# Patient Record
Sex: FEMALE | Race: WHITE | Employment: UNEMPLOYED | URBAN - METROPOLITAN AREA
[De-identification: names, ages, dates, MRNs, and addresses within clinical notes are randomized per-mention and may not be internally consistent; named-entity substitution may affect disease eponyms.]

---

## 2017-04-05 ENCOUNTER — HOSPITAL ENCOUNTER (EMERGENCY)
Age: 47
Discharge: HOME | End: 2017-04-05
Attending: EMERGENCY MEDICINE | Admitting: EMERGENCY MEDICINE
Payer: SUBSIDIZED

## 2017-04-05 VITALS
OXYGEN SATURATION: 100 % | RESPIRATION RATE: 14 BRPM | HEART RATE: 61 BPM | TEMPERATURE: 97.9 F | SYSTOLIC BLOOD PRESSURE: 103 MMHG | BODY MASS INDEX: 26.22 KG/M2 | DIASTOLIC BLOOD PRESSURE: 91 MMHG | WEIGHT: 163.14 LBS | HEIGHT: 66 IN

## 2017-04-05 DIAGNOSIS — J01.10 ACUTE NON-RECURRENT FRONTAL SINUSITIS: Primary | ICD-10-CM

## 2017-04-05 DIAGNOSIS — R00.2 PALPITATIONS: ICD-10-CM

## 2017-04-05 LAB
ALBUMIN SERPL BCP-MCNC: 4.2 G/DL (ref 3.5–5)
ALBUMIN/GLOB SERPL: 1.1 {RATIO} (ref 1.1–2.2)
ALP SERPL-CCNC: 72 U/L (ref 45–117)
ALT SERPL-CCNC: 25 U/L (ref 12–78)
ANION GAP BLD CALC-SCNC: 8 MMOL/L (ref 5–15)
APPEARANCE UR: CLEAR
AST SERPL W P-5'-P-CCNC: 20 U/L (ref 15–37)
ATRIAL RATE: 73 BPM
BACTERIA URNS QL MICRO: ABNORMAL /HPF
BASOPHILS # BLD AUTO: 0.1 K/UL (ref 0–0.1)
BASOPHILS # BLD: 1 % (ref 0–1)
BILIRUB SERPL-MCNC: 0.4 MG/DL (ref 0.2–1)
BILIRUB UR QL: NEGATIVE
BUN SERPL-MCNC: 9 MG/DL (ref 6–20)
BUN/CREAT SERPL: 9 (ref 12–20)
CALCIUM SERPL-MCNC: 8.8 MG/DL (ref 8.5–10.1)
CALCULATED P AXIS, ECG09: 77 DEGREES
CALCULATED R AXIS, ECG10: 6 DEGREES
CALCULATED T AXIS, ECG11: 44 DEGREES
CHLORIDE SERPL-SCNC: 103 MMOL/L (ref 97–108)
CO2 SERPL-SCNC: 27 MMOL/L (ref 21–32)
COLOR UR: ABNORMAL
CREAT SERPL-MCNC: 1.01 MG/DL (ref 0.55–1.02)
DIAGNOSIS, 93000: NORMAL
EOSINOPHIL # BLD: 0.2 K/UL (ref 0–0.4)
EOSINOPHIL NFR BLD: 3 % (ref 0–7)
EPITH CASTS URNS QL MICRO: ABNORMAL /LPF
ERYTHROCYTE [DISTWIDTH] IN BLOOD BY AUTOMATED COUNT: 12.2 % (ref 11.5–14.5)
GLOBULIN SER CALC-MCNC: 3.9 G/DL (ref 2–4)
GLUCOSE SERPL-MCNC: 89 MG/DL (ref 65–100)
GLUCOSE UR STRIP.AUTO-MCNC: NEGATIVE MG/DL
HCG UR QL: NEGATIVE
HCT VFR BLD AUTO: 44.2 % (ref 35–47)
HGB BLD-MCNC: 14.9 G/DL (ref 11.5–16)
HGB UR QL STRIP: NEGATIVE
HYALINE CASTS URNS QL MICRO: ABNORMAL /LPF (ref 0–5)
KETONES UR QL STRIP.AUTO: NEGATIVE MG/DL
LEUKOCYTE ESTERASE UR QL STRIP.AUTO: NEGATIVE
LYMPHOCYTES # BLD AUTO: 37 % (ref 12–49)
LYMPHOCYTES # BLD: 1.9 K/UL (ref 0.8–3.5)
MCH RBC QN AUTO: 31.8 PG (ref 26–34)
MCHC RBC AUTO-ENTMCNC: 33.7 G/DL (ref 30–36.5)
MCV RBC AUTO: 94.4 FL (ref 80–99)
MONOCYTES # BLD: 0.6 K/UL (ref 0–1)
MONOCYTES NFR BLD AUTO: 11 % (ref 5–13)
NEUTS SEG # BLD: 2.5 K/UL (ref 1.8–8)
NEUTS SEG NFR BLD AUTO: 48 % (ref 32–75)
NITRITE UR QL STRIP.AUTO: NEGATIVE
P-R INTERVAL, ECG05: 142 MS
PH UR STRIP: 7 [PH] (ref 5–8)
PLATELET # BLD AUTO: 335 K/UL (ref 150–400)
POTASSIUM SERPL-SCNC: 3.8 MMOL/L (ref 3.5–5.1)
PROT SERPL-MCNC: 8.1 G/DL (ref 6.4–8.2)
PROT UR STRIP-MCNC: NEGATIVE MG/DL
Q-T INTERVAL, ECG07: 368 MS
QRS DURATION, ECG06: 78 MS
QTC CALCULATION (BEZET), ECG08: 405 MS
RBC # BLD AUTO: 4.68 M/UL (ref 3.8–5.2)
RBC #/AREA URNS HPF: ABNORMAL /HPF (ref 0–5)
SODIUM SERPL-SCNC: 138 MMOL/L (ref 136–145)
SP GR UR REFRACTOMETRY: <1.005 (ref 1–1.03)
UA: UC IF INDICATED,UAUC: ABNORMAL
UROBILINOGEN UR QL STRIP.AUTO: 0.2 EU/DL (ref 0.2–1)
VENTRICULAR RATE, ECG03: 73 BPM
WBC # BLD AUTO: 5.1 K/UL (ref 3.6–11)
WBC URNS QL MICRO: ABNORMAL /HPF (ref 0–4)

## 2017-04-05 PROCEDURE — 80053 COMPREHEN METABOLIC PANEL: CPT | Performed by: PHYSICIAN ASSISTANT

## 2017-04-05 PROCEDURE — 81001 URINALYSIS AUTO W/SCOPE: CPT | Performed by: PHYSICIAN ASSISTANT

## 2017-04-05 PROCEDURE — 36415 COLL VENOUS BLD VENIPUNCTURE: CPT | Performed by: PHYSICIAN ASSISTANT

## 2017-04-05 PROCEDURE — 85025 COMPLETE CBC W/AUTO DIFF WBC: CPT | Performed by: PHYSICIAN ASSISTANT

## 2017-04-05 PROCEDURE — 81025 URINE PREGNANCY TEST: CPT | Performed by: PHYSICIAN ASSISTANT

## 2017-04-05 PROCEDURE — 93005 ELECTROCARDIOGRAM TRACING: CPT

## 2017-04-05 PROCEDURE — 99283 EMERGENCY DEPT VISIT LOW MDM: CPT

## 2017-04-05 RX ORDER — DEXAMETHASONE SODIUM PHOSPHATE 4 MG/ML
10 INJECTION, SOLUTION INTRA-ARTICULAR; INTRALESIONAL; INTRAMUSCULAR; INTRAVENOUS; SOFT TISSUE
Status: DISCONTINUED | OUTPATIENT
Start: 2017-04-05 | End: 2017-04-05 | Stop reason: HOSPADM

## 2017-04-05 RX ORDER — PREDNISONE 20 MG/1
60 TABLET ORAL DAILY
Qty: 12 TAB | Refills: 0 | Status: SHIPPED | OUTPATIENT
Start: 2017-04-05 | End: 2017-04-09

## 2017-04-05 RX ORDER — KETOROLAC TROMETHAMINE 30 MG/ML
30 INJECTION, SOLUTION INTRAMUSCULAR; INTRAVENOUS
Status: DISCONTINUED | OUTPATIENT
Start: 2017-04-05 | End: 2017-04-05 | Stop reason: HOSPADM

## 2017-04-05 RX ORDER — LORATADINE 10 MG/1
10 TABLET ORAL DAILY
Qty: 10 TAB | Refills: 0 | Status: SHIPPED | OUTPATIENT
Start: 2017-04-05 | End: 2017-04-15

## 2017-04-05 RX ORDER — CEPHALEXIN 500 MG/1
500 CAPSULE ORAL 2 TIMES DAILY
Qty: 14 CAP | Refills: 0 | Status: SHIPPED | OUTPATIENT
Start: 2017-04-05 | End: 2017-04-12

## 2017-04-05 RX ORDER — NAPROXEN SODIUM 220 MG
220 TABLET ORAL AS NEEDED
COMMUNITY
End: 2018-02-12

## 2017-04-05 NOTE — ED PROVIDER NOTES
HPI Comments: Cher Shirley is a 42 y.o. female with no pertinent PMHx, presenting ambulatory to the ED c/o sinus congestion x ~2 days. Pt notes associated symptoms of frontal headaches, cough only with sinus drainage, and sinus pressure. Of note, pt has a hx of environmental allergies (ie dogs and dust mites). Pt states that she has been taking OTC decongestants and Mucinex, which she believes have caused an increase of her chronic palpitations. Pt states \"it feels like my heart stops for a second, which causes me to catch my breath\". Pt states that \"then it beats really fast and returns to normal\". Pt also notes a recent increase in stress. Pt states that she has a hx of similar palpations, for which she has had appropriate cardiology follow up with a normal work up. Pt denies any recent cardiology follow up. Pt denies any history of DM. Pt specifically denies any SOB or chest pain. PCP: Audie Kay NP (Inactive)  Cardiology: Dr. Essence Lua Hx: - tobacco use, + occasional alcohol use, - illicit drug use    There are no other complaints, changes, or physical findings at this time. The history is provided by the patient. No  was used. Past Medical History:   Diagnosis Date    High cholesterol     Mononucleosis        Past Surgical History:   Procedure Laterality Date    HX HEENT      nose         Family History:   Problem Relation Age of Onset    Cancer Mother     Heart Disease Father        Social History     Social History    Marital status: SINGLE     Spouse name: N/A    Number of children: N/A    Years of education: N/A     Occupational History    Not on file.      Social History Main Topics    Smoking status: Former Smoker     Quit date: 3/7/2015    Smokeless tobacco: Former User     Quit date: 5/30/2015    Alcohol use 8.4 oz/week     14 Cans of beer per week    Drug use: No    Sexual activity: Yes     Partners: Female     Birth control/ protection: None Other Topics Concern    Not on file     Social History Narrative         ALLERGIES: Sulfa (sulfonamide antibiotics)    Review of Systems   Constitutional: Negative. Negative for chills and fever. HENT: Positive for congestion and sinus pressure. Eyes: Negative. Respiratory: Positive for cough (only with sinus drainage). Negative for shortness of breath and wheezing. Cardiovascular: Positive for palpitations. Negative for chest pain. Gastrointestinal: Negative. Negative for abdominal pain, diarrhea, nausea and vomiting. Genitourinary: Negative. Negative for difficulty urinating, dysuria and vaginal pain. Skin: Negative. Allergic/Immunologic: Positive for environmental allergies. Neurological: Positive for headaches (frontal). Psychiatric/Behavioral: Negative. All other systems reviewed and are negative. Vitals:    04/05/17 0946 04/05/17 1041   BP:  111/89   Pulse: 71 71   Resp: 12 13   Temp: 97.9 °F (36.6 °C)    SpO2: 99% 100%   Weight: 74 kg (163 lb 2.3 oz)    Height: 5' 6\" (1.676 m)             Physical Exam   Constitutional: She is oriented to person, place, and time. She appears well-developed and well-nourished. No distress. HENT:   Head: Normocephalic and atraumatic. Boggy nasal mucosa, clear rhinorrhea, posterior oropharynx injected without exudate. Increased effusion noted to bilat TMs, no erythema, good light reflex noted. Eyes: Conjunctivae and EOM are normal. Right eye exhibits no discharge. Left eye exhibits no discharge. No scleral icterus. Neck: Normal range of motion. Neck supple. No JVD present. No tracheal deviation present. No thyromegaly present. Cardiovascular: Normal rate, regular rhythm and normal heart sounds. Pulmonary/Chest: Effort normal and breath sounds normal. No respiratory distress. She has no wheezes. Abdominal: Soft. There is no tenderness. Musculoskeletal: Normal range of motion. She exhibits no edema.    Lymphadenopathy: She has no cervical adenopathy. Neurological: She is alert and oriented to person, place, and time. She exhibits normal muscle tone. Coordination normal.   Skin: Skin is warm and dry. She is not diaphoretic. Psychiatric: She has a normal mood and affect. Her behavior is normal. Judgment normal.   Nursing note and vitals reviewed. MDM  Number of Diagnoses or Management Options  Diagnosis management comments: DDx: sinusitis, electrolyte dysfunction, arrhythmia, UTI       Amount and/or Complexity of Data Reviewed  Clinical lab tests: reviewed and ordered  Tests in the medicine section of CPT®: ordered and reviewed  Review and summarize past medical records: yes  Independent visualization of images, tracings, or specimens: yes    Patient Progress  Patient progress: stable    ED Course       Procedures   EKG interpretation: (Preliminary) at 0951  Rhythm: normal sinus rhythm; and regular . Rate (approx.): 73 bpm; Axis: normal; P wave: normal; QRS interval: normal ; ST/T wave: normal.  Written by Keila Palacios. Raúl Shah, ED Scribe, as dictated by Gisele Marcelo.       LABORATORY TESTS:  Recent Results (from the past 12 hour(s))   EKG, 12 LEAD, INITIAL    Collection Time: 04/05/17  9:51 AM   Result Value Ref Range    Ventricular Rate 73 BPM    Atrial Rate 73 BPM    P-R Interval 142 ms    QRS Duration 78 ms    Q-T Interval 368 ms    QTC Calculation (Bezet) 405 ms    Calculated P Axis 77 degrees    Calculated R Axis 6 degrees    Calculated T Axis 44 degrees    Diagnosis       ** Poor data quality, interpretation may be adversely affected  Normal sinus rhythm  Low voltage QRS  RSR' pattern in V1      Confirmed by Maksim Dash MD, Luis Gonzalez ([id number]) on 4/5/2017 10:25:06 AM     URINALYSIS W/ REFLEX CULTURE    Collection Time: 04/05/17 11:14 AM   Result Value Ref Range    Color YELLOW/STRAW      Appearance CLEAR CLEAR      Specific gravity <1.005 1.003 - 1.030    pH (UA) 7.0 5.0 - 8.0      Protein NEGATIVE  NEG mg/dL    Glucose NEGATIVE NEG mg/dL    Ketone NEGATIVE  NEG mg/dL    Bilirubin NEGATIVE  NEG      Blood NEGATIVE  NEG      Urobilinogen 0.2 0.2 - 1.0 EU/dL    Nitrites NEGATIVE  NEG      Leukocyte Esterase NEGATIVE  NEG      WBC 0-4 0 - 4 /hpf    RBC 0-5 0 - 5 /hpf    Epithelial cells FEW FEW /lpf    Bacteria 1+ (A) NEG /hpf    UA:UC IF INDICATED CULTURE NOT INDICATED BY UA RESULT CNI      Hyaline cast 0-2 0 - 5 /lpf   HCG URINE, QL    Collection Time: 04/05/17 11:14 AM   Result Value Ref Range    HCG urine, Ql. NEGATIVE  NEG     CBC WITH AUTOMATED DIFF    Collection Time: 04/05/17 11:14 AM   Result Value Ref Range    WBC 5.1 3.6 - 11.0 K/uL    RBC 4.68 3.80 - 5.20 M/uL    HGB 14.9 11.5 - 16.0 g/dL    HCT 44.2 35.0 - 47.0 %    MCV 94.4 80.0 - 99.0 FL    MCH 31.8 26.0 - 34.0 PG    MCHC 33.7 30.0 - 36.5 g/dL    RDW 12.2 11.5 - 14.5 %    PLATELET 806 414 - 220 K/uL    NEUTROPHILS 48 32 - 75 %    LYMPHOCYTES 37 12 - 49 %    MONOCYTES 11 5 - 13 %    EOSINOPHILS 3 0 - 7 %    BASOPHILS 1 0 - 1 %    ABS. NEUTROPHILS 2.5 1.8 - 8.0 K/UL    ABS. LYMPHOCYTES 1.9 0.8 - 3.5 K/UL    ABS. MONOCYTES 0.6 0.0 - 1.0 K/UL    ABS. EOSINOPHILS 0.2 0.0 - 0.4 K/UL    ABS. BASOPHILS 0.1 0.0 - 0.1 K/UL   METABOLIC PANEL, COMPREHENSIVE    Collection Time: 04/05/17 11:14 AM   Result Value Ref Range    Sodium 138 136 - 145 mmol/L    Potassium 3.8 3.5 - 5.1 mmol/L    Chloride 103 97 - 108 mmol/L    CO2 27 21 - 32 mmol/L    Anion gap 8 5 - 15 mmol/L    Glucose 89 65 - 100 mg/dL    BUN 9 6 - 20 MG/DL    Creatinine 1.01 0.55 - 1.02 MG/DL    BUN/Creatinine ratio 9 (L) 12 - 20      GFR est AA >60 >60 ml/min/1.73m2    GFR est non-AA 60 (L) >60 ml/min/1.73m2    Calcium 8.8 8.5 - 10.1 MG/DL    Bilirubin, total 0.4 0.2 - 1.0 MG/DL    ALT (SGPT) 25 12 - 78 U/L    AST (SGOT) 20 15 - 37 U/L    Alk.  phosphatase 72 45 - 117 U/L    Protein, total 8.1 6.4 - 8.2 g/dL    Albumin 4.2 3.5 - 5.0 g/dL    Globulin 3.9 2.0 - 4.0 g/dL    A-G Ratio 1.1 1.1 - 2.2       MEDICATIONS GIVEN:  Medications   ketorolac (TORADOL) injection 30 mg (not administered)   dexamethasone (DECADRON) 4 mg/mL injection 10 mg (not administered)       IMPRESSION:  1. Acute non-recurrent frontal sinusitis    2. Palpitations        PLAN:  1. Current Discharge Medication List      START taking these medications    Details   predniSONE (DELTASONE) 20 mg tablet Take 3 Tabs by mouth daily for 4 days. Qty: 12 Tab, Refills: 0      cephALEXin (KEFLEX) 500 mg capsule Take 1 Cap by mouth two (2) times a day for 7 days. Qty: 14 Cap, Refills: 0      loratadine (CLARITIN) 10 mg tablet Take 1 Tab by mouth daily for 10 days. Qty: 10 Tab, Refills: 0           2. Follow-up Information     Follow up With Details Comments Contact Info    KYLER Ramsay MD [address] [place] [id number]  [phone]      [facility] EMERGENCY DEPT  If symptoms worsen [address] [place] [address]  [address] [facility]  [phone]        Return to ED if worse   DISCHARGE NOTE:  12:26 PM  The patient is ready for discharge. The patient's signs, symptoms, diagnosis, and discharge instructions have been discussed and the patient and/or family has conveyed their understanding. The patient and/or family is to follow up as recommended or return to the ER should their symptoms worsen. Plan has been discussed and the patient and/or family is in agreement. Written by Janine Richard, ED Scribe, as dictated by Diane Resendez. Attestation: This note is prepared by Leslie Nunez. Jumana Richard, acting as Scribe for Diane Resendez. MARY LOU Garcia: The scribe's documentation has been prepared under my direction and personally reviewed by me in its entirety. I confirm that the note above accurately reflects all work, treatment, procedures, and medical decision making performed by me.

## 2017-04-05 NOTE — ED NOTES
Patient reports that she has had a head cold since Friday. Patient states that she has been feeling flutters in her heart the last few days. Patient reports that she has had this before and was seen by a cardiologist a few years ago. Patient reports that she often feels these flutters when she's anxious. Denies CP but states that she feels like she gets short of breath with some of it. Reports intermittent nausea No distress noted. Will continue to monitor.

## 2017-04-05 NOTE — DISCHARGE INSTRUCTIONS
Sinusitis: Care Instructions  Your Care Instructions    Sinusitis is an infection of the lining of the sinus cavities in your head. Sinusitis often follows a cold. It causes pain and pressure in your head and face. In most cases, sinusitis gets better on its own in 1 to 2 weeks. But some mild symptoms may last for several weeks. Sometimes antibiotics are needed. Follow-up care is a key part of your treatment and safety. Be sure to make and go to all appointments, and call your doctor if you are having problems. It's also a good idea to know your test results and keep a list of the medicines you take. How can you care for yourself at home? · Take an over-the-counter pain medicine, such as acetaminophen (Tylenol), ibuprofen (Advil, Motrin), or naproxen (Aleve). Read and follow all instructions on the label. · If the doctor prescribed antibiotics, take them as directed. Do not stop taking them just because you feel better. You need to take the full course of antibiotics. · Be careful when taking over-the-counter cold or flu medicines and Tylenol at the same time. Many of these medicines have acetaminophen, which is Tylenol. Read the labels to make sure that you are not taking more than the recommended dose. Too much acetaminophen (Tylenol) can be harmful. · Breathe warm, moist air from a steamy shower, a hot bath, or a sink filled with hot water. Avoid cold, dry air. Using a humidifier in your home may help. Follow the directions for cleaning the machine. · Use saline (saltwater) nasal washes to help keep your nasal passages open and wash out mucus and bacteria. You can buy saline nose drops at a grocery store or drugstore. Or you can make your own at home by adding 1 teaspoon of salt and 1 teaspoon of baking soda to 2 cups of distilled water. If you make your own, fill a bulb syringe with the solution, insert the tip into your nostril, and squeeze gently. [employer] your nose.   · Put a hot, wet towel or a warm gel pack on your face 3 or 4 times a day for 5 to 10 minutes each time. · Try a decongestant nasal spray like oxymetazoline (Afrin). Do not use it for more than 3 days in a row. Using it for more than 3 days can make your congestion worse. When should you call for help? Call your doctor now or seek immediate medical care if:  · You have new or worse swelling or redness in your face or around your eyes. · You have a new or higher fever. Watch closely for changes in your health, and be sure to contact your doctor if:  · You have new or worse facial pain. · The mucus from your nose becomes thicker (like pus) or has new blood in it. · You are not getting better as expected. Where can you learn more? Go to http://franki-vikram.info/. Enter X186 in the search box to learn more about \"Sinusitis: Care Instructions. \"  Current as of: July 29, 2016  Content Version: 11.2  © 5072-6740 [employer]. Care instructions adapted under license by [employer] (which disclaims liability or warranty for this information). If you have questions about a medical condition or this instruction, always ask your healthcare professional. [place] [address] any warranty or liability for your use of this information. Saline Nasal Washes: Care Instructions  Your Care Instructions  Saline nasal washes help keep the nasal passages open by washing out thick or dried mucus. This simple remedy can help relieve symptoms of allergies, sinusitis, and colds. It also can make the nose feel more comfortable by keeping the mucous membranes moist. You may notice a little burning sensation in your nose the first few times you use the solution, but this usually gets better in a few days. Follow-up care is a key part of your treatment and safety. Be sure to make and go to all appointments, and call your doctor if you are having problems.  It's also a good idea to know your test results and keep a list of the medicines you take. How can you care for yourself at home? · You can buy premixed saline solution in a squeeze bottle or other sinus rinse products at a drugstore. Read and follow the instructions on the label. · You also can make your own saline solution by adding 1 teaspoon of salt and 1 teaspoon of baking soda to 2 cups of distilled water. · If you use a homemade solution, pour a small amount into a clean bowl. Using a rubber bulb syringe, squeeze the syringe and place the tip in the salt water. Pull a small amount of the salt water into the syringe by relaxing your hand. · Sit down with your head tilted slightly back. Do not lie down. Put the tip of the bulb syringe or the squeeze bottle a little way into one of your nostrils. Gently drip or squirt a few drops into the nostril. Repeat with the other nostril. Some sneezing and gagging are normal at first.  · Gently blow your nose. · Wipe the syringe or bottle tip clean after each use. · Repeat this 2 or 3 times a day. · Use nasal washes gently if you have nosebleeds often. When should you call for help? Watch closely for changes in your health, and be sure to contact your doctor if:  · You often get nosebleeds. · You have problems doing the nasal washes. Where can you learn more? Go to http://franki-vikram.info/. Enter [id number] in the search box to learn more about \"Saline Nasal Washes: Care Instructions. \"  Current as of: July 29, 2016  Content Version: 11.2  © 0031-4100 [employer]. Care instructions adapted under license by [employer] (which disclaims liability or warranty for this information). If you have questions about a medical condition or this instruction, always ask your healthcare professional. [address] any warranty or liability for your use of this information.          Palpitations: Care Instructions  Your Care Instructions    Heart palpitations are the uncomfortable sensation that your heart is beating fast or irregularly. You might feel pounding or fluttering in your chest. It might feel like your heart is skipping a beat. Although palpitations may be caused by a heart problem, they also occur because of stress, fatigue, or use of alcohol, caffeine, or nicotine. Many medicines, including diet pills, antihistamines, decongestants, and some herbal products, can cause heart palpitations. Nearly everyone has palpitations from time to time. Depending on your symptoms, your doctor may need to do more tests to try to find the cause of your palpitations. Follow-up care is a key part of your treatment and safety. Be sure to make and go to all appointments, and call your doctor if you are having problems. It's also a good idea to know your test results and keep a list of the medicines you take. How can you care for yourself at home? · Avoid caffeine, nicotine, and excess alcohol. · Do not take illegal drugs, such as methamphetamines and cocaine. · Do not take weight loss or diet medicines unless you talk with your doctor first.  · Get plenty of sleep. · Do not overeat. · If you have palpitations again, take deep breaths and try to relax. This may slow a racing heart. · If you start to feel lightheaded, lie down to avoid injuries that might result if you pass out and fall down. · Keep a record of your palpitations and bring it to your next doctor's appointment. Write down:  ¨ The date and time. ¨ Your pulse. (If your heart is beating fast, it may be hard to count your pulse.)  ¨ What you were doing when the palpitations started. ¨ How long the palpitations lasted. ¨ Any other symptoms. · If an activity causes palpitations, slow down or stop. Talk to your doctor before you do that activity again. · Take your medicines exactly as prescribed. Call your doctor if you think you are having a problem with your medicine. When should you call for help?   Call [phone] anytime you think you may need emergency care. For example, call if:  · You passed out (lost consciousness). · You have symptoms of a heart attack. These may include:  ¨ Chest pain or pressure, or a strange feeling in the chest.  ¨ Sweating. ¨ Shortness of breath. ¨ Pain, pressure, or a strange feeling in the back, neck, jaw, or upper belly or in one or both shoulders or arms. ¨ Lightheadedness or sudden weakness. ¨ A fast or irregular heartbeat. After you call 911, the  may tell you to chew 1 adult-strength or 2 to 4 low-dose aspirin. Wait for an ambulance. Do not try to drive yourself. · You have symptoms of a stroke. These may include:  ¨ Sudden numbness, tingling, weakness, or loss of movement in your face, arm, or leg, especially on only one side of your body. ¨ Sudden vision changes. ¨ Sudden trouble speaking. ¨ Sudden confusion or trouble understanding simple statements. ¨ Sudden problems with walking or balance. ¨ A sudden, severe headache that is different from past headaches. Call your doctor now or seek immediate medical care if:  · You have heart palpitations and:  ¨ Are dizzy or lightheaded, or you feel like you may faint. ¨ Have new or increased shortness of breath. Watch closely for changes in your health, and be sure to contact your doctor if:  · You continue to have heart palpitations. Where can you learn more? Go to http://franki-vikram.info/. Enter R508 in the search box to learn more about \"Palpitations: Care Instructions. \"  Current as of: January 27, 2016  Content Version: 11.2  © 6134-4820 [employer]. Care instructions adapted under license by [employer] (which disclaims liability or warranty for this information). If you have questions about a medical condition or this instruction, always ask your healthcare professional. [address] any warranty or liability for your use of this information.

## 2017-04-05 NOTE — LETTER
[address] 
[facility] EMERGENCY DEPT 
[address] [place] [address] [address] [id number] [phone] Work/School Note Date: 4/5/2017 To Whom It May concern: 
 
Cindy Griggs was seen and treated today in the emergency room. She has been experiencing symptoms since 4/3/17. She may return to work in 1 to 2 days, as symptoms improve. Sincerely, Palak Singletary

## 2017-04-05 NOTE — ED NOTES
Pt discharged by LIUDMILA Garcia. Pt provided with discharge instructions Rx and instructions on follow up care. Pt out of ED under own power with steady gait accompanied by self. Pt IV discontinued prior to pt receiving any medications.

## 2017-08-16 ENCOUNTER — HOSPITAL ENCOUNTER (EMERGENCY)
Age: 47
Discharge: HOME | End: 2017-08-16
Attending: EMERGENCY MEDICINE | Admitting: EMERGENCY MEDICINE
Payer: SUBSIDIZED

## 2017-08-16 VITALS
HEIGHT: 66 IN | OXYGEN SATURATION: 98 % | TEMPERATURE: 97.7 F | SYSTOLIC BLOOD PRESSURE: 127 MMHG | BODY MASS INDEX: 24.94 KG/M2 | RESPIRATION RATE: 16 BRPM | HEART RATE: 64 BPM | WEIGHT: 155.2 LBS | DIASTOLIC BLOOD PRESSURE: 71 MMHG

## 2017-08-16 DIAGNOSIS — M25.511 ACUTE PAIN OF RIGHT SHOULDER: Primary | ICD-10-CM

## 2017-08-16 PROCEDURE — 99282 EMERGENCY DEPT VISIT SF MDM: CPT

## 2017-08-16 RX ORDER — OXYCODONE AND ACETAMINOPHEN 5; 325 MG/1; MG/1
1 TABLET ORAL
Qty: 15 TAB | Refills: 0 | Status: SHIPPED | OUTPATIENT
Start: 2017-08-16 | End: 2018-02-12

## 2017-08-16 RX ORDER — CYCLOBENZAPRINE HCL 10 MG
10 TABLET ORAL
Qty: 20 TAB | Refills: 0 | Status: SHIPPED | OUTPATIENT
Start: 2017-08-16 | End: 2018-02-12

## 2017-08-16 RX ORDER — IBUPROFEN 600 MG/1
600 TABLET ORAL
Qty: 20 TAB | Refills: 0 | Status: SHIPPED | OUTPATIENT
Start: 2017-08-16 | End: 2017-08-23

## 2017-08-16 NOTE — ED PROVIDER NOTES
HPI Comments: Jewell Sterling is a 42 y.o. female with PMHx of herniated cervical disc, presenting ambulatory to ED c/o intermittent right shoulder/scapular pain for the past month. Pt rates current pain 6/10 and notes it is unchanged with taking Aleve/Naproxen/Advil. She reports the pain is worse with rotating her neck to the left. Pt endorses she started a new job as an  prior to symptom onset. She reports the pain improved a few weeks after onset when she decreased the amount of lifting she was performing on the job and returned recently after resuming heavy lifting. Pt endorses history of cervical herniated disc and bulging disc, for which she is followed by Dr. Buffy Duron with orthopedics. She notes she recently had an injection into her neck per another specialist, which mildly improved her pain. Pt denies known fall/injury/trauma. She denies tobacco use. Orthopedics: Dr. Buffy Duron  PCP: Mario Miner MD  Social Hx: former smoker (quit date: 3/7/2015); + EtOH (8.4 oz/week); - drug use. There are no other complaints, changes, or physical findings at this time. Written by DARION Porter, as dictated by Griffin Olszewski, PA-C. The history is provided by the patient. Past Medical History:   Diagnosis Date    Herniated cervical disc 2014    c4, c6    High cholesterol     Mononucleosis     Palpitations 2016       Past Surgical History:   Procedure Laterality Date    HX HEENT      nose         Family History:   Problem Relation Age of Onset    Cancer Mother     Heart Disease Father        Social History     Social History    Marital status: SINGLE     Spouse name: N/A    Number of children: N/A    Years of education: N/A     Occupational History    Not on file.      Social History Main Topics    Smoking status: Former Smoker     Quit date: 3/7/2015    Smokeless tobacco: Former User     Quit date: 5/30/2015    Alcohol use 8.4 oz/week     14 Cans of beer per week    Drug use: No    Sexual activity: Yes     Partners: Female     Birth control/ protection: None     Other Topics Concern    Not on file     Social History Narrative         ALLERGIES: Sulfa (sulfonamide antibiotics)    Review of Systems   Constitutional: Negative for fatigue and fever. HENT: Negative for ear pain and sore throat. Eyes: Negative for pain, redness and visual disturbance. Respiratory: Negative for cough and shortness of breath. Cardiovascular: Negative for chest pain and palpitations. Gastrointestinal: Negative for abdominal pain, nausea and vomiting. Genitourinary: Negative for dysuria, frequency and urgency. Musculoskeletal: Positive for arthralgias (right shoulder/scapular pain ). Negative for back pain, gait problem, neck pain and neck stiffness. Skin: Negative for rash and wound. Neurological: Negative for dizziness, weakness, light-headedness, numbness and headaches. All other systems reviewed and are negative. Vitals:    08/16/17 1236   BP: 127/71   Pulse: 64   Resp: 16   Temp: 97.7 °F (36.5 °C)   SpO2: 98%   Weight: 70.4 kg (155 lb 3.3 oz)   Height: 5' 6\" (1.676 m)            Physical Exam   Constitutional: She is oriented to person, place, and time. She appears well-developed and well-nourished. Non-toxic appearance. No distress. HENT:   Head: Normocephalic and atraumatic. Right Ear: External ear normal.   Left Ear: External ear normal.   Nose: Nose normal.   Mouth/Throat: Uvula is midline. No trismus in the jaw. Eyes: Conjunctivae and EOM are normal. Pupils are equal, round, and reactive to light. No scleral icterus. Neck: Normal range of motion and full passive range of motion without pain. Cardiovascular: Normal rate and regular rhythm. Pulmonary/Chest: Effort normal. No accessory muscle usage. No tachypnea. No respiratory distress. She has no decreased breath sounds. She has no wheezes. Abdominal: Soft. There is no tenderness.    Musculoskeletal: Normal range of motion. RIGHT SHOULDER:  Good symmetry  No bruising, redness or swelling  ROM limited secondary to pain  Posterior scapular tenderness, worse with movement     Neurological: She is alert and oriented to person, place, and time. She is not disoriented. No cranial nerve deficit. GCS eye subscore is 4. GCS verbal subscore is 5. GCS motor subscore is 6. Skin: Skin is intact. No rash noted. Psychiatric: She has a normal mood and affect. Her speech is normal.   Nursing note and vitals reviewed. MDM  Number of Diagnoses or Management Options  Acute pain of right shoulder:   Diagnosis management comments: Afebrile; well appearing; reassuring exam; additional testing deferred; presentation suggests strain/overuse injury; plan as below         Amount and/or Complexity of Data Reviewed  Review and summarize past medical records: yes    Patient Progress  Patient progress: stable    Procedures    IMPRESSION:  1. Acute pain of right shoulder        PLAN:  1. Current Discharge Medication List      START taking these medications    Details   cyclobenzaprine (FLEXERIL) 10 mg tablet Take 1 Tab by mouth three (3) times daily as needed for Muscle Spasm(s). Qty: 20 Tab, Refills: 0      ibuprofen (MOTRIN) 600 mg tablet Take 1 Tab by mouth every eight (8) hours as needed for Pain for up to 7 days. Qty: 20 Tab, Refills: 0      oxyCODONE-acetaminophen (PERCOCET) 5-325 mg per tablet Take 1 Tab by mouth every four (4) hours as needed for Pain. Max Daily Amount: 6 Tabs. Qty: 15 Tab, Refills: 0           2.    Follow-up Information     Follow up With Details Comments Contact Info    Eb Brooks MD Schedule an appointment as soon as possible for a visit PRIMARY CARE: as needed [address] [place] [address] [phone]      Dariusz Barros MD Schedule an appointment as soon as possible for a visit ORTHO: as needed if symptoms persist [address] [address]  [phone]          Return to ED if worse     DISCHARGE NOTE  1:54 PM  The patient has been re-evaluated and is ready for discharge. Reviewed available results with patient. Counseled pt on diagnosis and care plan. Pt has expressed understanding, and all questions have been answered. Pt agrees with plan and agrees to F/U as recommended, or return to the ED if their sxs worsen. Discharge instructions have been provided and explained to the pt, along with reasons to return to the ED. Written by Georgie Anna, ED Scribe, as dictated by Whitney Lima PA-C. This note is prepared by Georgie Anna, acting as Scribe for Whitney Lima PA-C. Whitney Lima PA-C: The scribe's documentation has been prepared under my direction and personally reviewed by me in its entirety. I confirm that the note above accurately reflects all work, treatment, procedures, and medical decision making performed by me.

## 2017-08-16 NOTE — DISCHARGE INSTRUCTIONS
Thank you for allowing us to provide you with care today. We hope we addressed all of your concerns and needs. We strive to provide excellent quality care in the Emergency Department. Please rate us as excellent, as anything less than excellent does not meet our expectations. If you feel that you have not received excellent quality care or timely care, please ask to speak to the nurse manager. Please choose us in the future for your continued health care needs. The exam and treatment you received in the Emergency Department were for an urgent problem and are not intended as complete care. It is important that you follow-up with a doctor, nurse practitioner, or  [id number] assistant to: (1) confirm your diagnosis, (2) re-evaluation of changes in your illness and treatment, and (3) for ongoing care. If your symptoms become worse or you do not improve as expected and you are unable to reach your usual health care provider, you should return to the Emergency Department. We are available 24 hours a day. Take this sheet with you when you go to your follow-up visit. If you have any problem arranging the follow-up visit, contact the Emergency Department immediately. Make an appointment with your Primary Care doctor for follow up of this visit. Return to the ER if you are unable to be seen in the time recommended on your discharge instructions.

## 2017-08-16 NOTE — ED NOTES
MARY LOU Howard reviewed discharge instructions with the patient. The patient verbalized understanding. Pt ambulatory out of ED.

## 2017-08-16 NOTE — LETTER
[address] 
[facility] EMERGENCY DEPT 
[address] [address] [place] [id number] 
[phone] Work/School Note Date: 8/16/2017 To Whom It May concern: 
 
Basia Remy was seen and treated today in the emergency room by the following provider(s): 
Physician Assistant: LIUDMILA Mckenna. Basia Remy may return to work on 8/19/17 or sooner, if feeling better. Sincerely, Veto Stock PA

## 2017-08-16 NOTE — ED NOTES
LIUDMILA Hathaway reviewed discharge instructions with the patient. The patient verbalized understanding. Patient ambulatory out of ED with discharge paperwork in hand.

## 2017-08-16 NOTE — LETTER
[address] 
[facility] EMERGENCY DEPT 
[address] [place] [address] [address] [id number] [phone] Work/School Note Date: 8/16/2017 To Whom It May concern: 
 
Barbara Arnold was seen and treated today in the emergency room by the following provider(s): 
Attending Provider: Bonnie Barroso DO Physician Assistant: LIUDMILA Bhandari. Barbara Arnold may return to work on [id number] [employer] Sincerely, LIUDMILA Bhandari

## 2017-10-13 ENCOUNTER — HOSPITAL ENCOUNTER (OUTPATIENT)
Dept: GENERAL RADIOLOGY | Age: 47
Discharge: HOME | End: 2017-10-13
Attending: FAMILY MEDICINE
Payer: SUBSIDIZED

## 2017-10-13 ENCOUNTER — HOSPITAL ENCOUNTER (OUTPATIENT)
Dept: MRI IMAGING | Age: 47
Discharge: HOME | End: 2017-10-13
Attending: FAMILY MEDICINE
Payer: SUBSIDIZED

## 2017-10-13 DIAGNOSIS — M50.10 CERVICAL DISC DISORDER WITH RADICULOPATHY: ICD-10-CM

## 2017-10-13 DIAGNOSIS — M54.2 NECK PAIN: ICD-10-CM

## 2017-10-13 PROCEDURE — 74011250636 HC RX REV CODE- 250/636: Performed by: RADIOLOGY

## 2017-10-13 PROCEDURE — 72156 MRI NECK SPINE W/O & W/DYE: CPT

## 2017-10-13 PROCEDURE — 72040 X-RAY EXAM NECK SPINE 2-3 VW: CPT

## 2017-10-13 PROCEDURE — A9576 INJ PROHANCE MULTIPACK: HCPCS | Performed by: RADIOLOGY

## 2017-10-13 RX ADMIN — GADOTERIDOL 15 ML: 279.3 INJECTION, SOLUTION INTRAVENOUS at 16:03

## 2017-12-19 ENCOUNTER — HOSPITAL ENCOUNTER (OUTPATIENT)
Dept: LAB | Age: 47
Discharge: HOME | End: 2017-12-19

## 2017-12-19 PROCEDURE — 85027 COMPLETE CBC AUTOMATED: CPT | Performed by: FAMILY MEDICINE

## 2017-12-19 PROCEDURE — 85652 RBC SED RATE AUTOMATED: CPT | Performed by: FAMILY MEDICINE

## 2017-12-19 PROCEDURE — 80053 COMPREHEN METABOLIC PANEL: CPT | Performed by: FAMILY MEDICINE

## 2017-12-20 LAB
ALBUMIN SERPL-MCNC: 4 G/DL (ref 3.5–5)
ALBUMIN/GLOB SERPL: 1.2 {RATIO} (ref 1.1–2.2)
ALP SERPL-CCNC: 77 U/L (ref 45–117)
ALT SERPL-CCNC: 28 U/L (ref 12–78)
ANION GAP SERPL CALC-SCNC: 7 MMOL/L (ref 5–15)
AST SERPL-CCNC: 16 U/L (ref 15–37)
BILIRUB SERPL-MCNC: 0.2 MG/DL (ref 0.2–1)
BUN SERPL-MCNC: 7 MG/DL (ref 6–20)
BUN/CREAT SERPL: 9 (ref 12–20)
CALCIUM SERPL-MCNC: 8.9 MG/DL (ref 8.5–10.1)
CHLORIDE SERPL-SCNC: 105 MMOL/L (ref 97–108)
CO2 SERPL-SCNC: 28 MMOL/L (ref 21–32)
CREAT SERPL-MCNC: 0.77 MG/DL (ref 0.55–1.02)
ERYTHROCYTE [DISTWIDTH] IN BLOOD BY AUTOMATED COUNT: 12.8 % (ref 11.5–14.5)
ERYTHROCYTE [SEDIMENTATION RATE] IN BLOOD: 6 MM/HR (ref 0–20)
GLOBULIN SER CALC-MCNC: 3.4 G/DL (ref 2–4)
GLUCOSE SERPL-MCNC: 134 MG/DL (ref 65–100)
HCT VFR BLD AUTO: 43.3 % (ref 35–47)
HGB BLD-MCNC: 14.3 G/DL (ref 11.5–16)
MCH RBC QN AUTO: 31.5 PG (ref 26–34)
MCHC RBC AUTO-ENTMCNC: 33 G/DL (ref 30–36.5)
MCV RBC AUTO: 95.4 FL (ref 80–99)
PLATELET # BLD AUTO: 309 K/UL (ref 150–400)
POTASSIUM SERPL-SCNC: 4.5 MMOL/L (ref 3.5–5.1)
PROT SERPL-MCNC: 7.4 G/DL (ref 6.4–8.2)
RBC # BLD AUTO: 4.54 M/UL (ref 3.8–5.2)
SODIUM SERPL-SCNC: 140 MMOL/L (ref 136–145)
WBC # BLD AUTO: 5.7 K/UL (ref 3.6–11)

## 2018-02-12 ENCOUNTER — HOSPITAL ENCOUNTER (EMERGENCY)
Age: 48
Discharge: HOME | End: 2018-02-12
Attending: EMERGENCY MEDICINE
Payer: SELF-PAY

## 2018-02-12 VITALS
TEMPERATURE: 97.4 F | DIASTOLIC BLOOD PRESSURE: 107 MMHG | BODY MASS INDEX: 27.64 KG/M2 | WEIGHT: 171.96 LBS | OXYGEN SATURATION: 100 % | HEIGHT: 66 IN | SYSTOLIC BLOOD PRESSURE: 145 MMHG | RESPIRATION RATE: 22 BRPM

## 2018-02-12 DIAGNOSIS — N39.0 URINARY TRACT INFECTION WITHOUT HEMATURIA, SITE UNSPECIFIED: Primary | ICD-10-CM

## 2018-02-12 LAB
APPEARANCE UR: ABNORMAL
BACTERIA URNS QL MICRO: NEGATIVE /HPF
BILIRUB UR QL: NEGATIVE
COLOR UR: ABNORMAL
EPITH CASTS URNS QL MICRO: ABNORMAL /LPF
GLUCOSE UR STRIP.AUTO-MCNC: NEGATIVE MG/DL
HGB UR QL STRIP: ABNORMAL
KETONES UR QL STRIP.AUTO: NEGATIVE MG/DL
LEUKOCYTE ESTERASE UR QL STRIP.AUTO: ABNORMAL
NITRITE UR QL STRIP.AUTO: POSITIVE
PH UR STRIP: 5.5 [PH] (ref 5–8)
PROT UR STRIP-MCNC: NEGATIVE MG/DL
RBC #/AREA URNS HPF: ABNORMAL /HPF (ref 0–5)
SP GR UR REFRACTOMETRY: 1.01 (ref 1–1.03)
UROBILINOGEN UR QL STRIP.AUTO: 1 EU/DL (ref 0.2–1)
WBC URNS QL MICRO: ABNORMAL /HPF (ref 0–4)

## 2018-02-12 PROCEDURE — 74011250637 HC RX REV CODE- 250/637: Performed by: PHYSICIAN ASSISTANT

## 2018-02-12 PROCEDURE — 99283 EMERGENCY DEPT VISIT LOW MDM: CPT

## 2018-02-12 PROCEDURE — 81001 URINALYSIS AUTO W/SCOPE: CPT | Performed by: EMERGENCY MEDICINE

## 2018-02-12 RX ORDER — PHENAZOPYRIDINE HYDROCHLORIDE 200 MG/1
200 TABLET, FILM COATED ORAL 3 TIMES DAILY
Qty: 6 TAB | Refills: 0 | Status: SHIPPED | OUTPATIENT
Start: 2018-02-12 | End: 2018-02-14

## 2018-02-12 RX ORDER — CIPROFLOXACIN 500 MG/1
500 TABLET ORAL 2 TIMES DAILY
Qty: 14 TAB | Refills: 0 | Status: SHIPPED | OUTPATIENT
Start: 2018-02-12 | End: 2018-02-19

## 2018-02-12 RX ORDER — CIPROFLOXACIN 500 MG/1
500 TABLET ORAL
Status: COMPLETED | OUTPATIENT
Start: 2018-02-12 | End: 2018-02-12

## 2018-02-12 RX ORDER — PHENAZOPYRIDINE HYDROCHLORIDE 100 MG/1
200 TABLET, FILM COATED ORAL
Status: COMPLETED | OUTPATIENT
Start: 2018-02-12 | End: 2018-02-12

## 2018-02-12 RX ADMIN — CIPROFLOXACIN HYDROCHLORIDE 500 MG: 500 TABLET, FILM COATED ORAL at 10:46

## 2018-02-12 RX ADMIN — PHENAZOPYRIDINE HYDROCHLORIDE 200 MG: 100 TABLET ORAL at 10:46

## 2018-02-12 NOTE — DISCHARGE INSTRUCTIONS

## 2018-02-12 NOTE — ED PROVIDER NOTES
EMERGENCY DEPARTMENT HISTORY AND PHYSICAL EXAM      Date: 2/12/2018  Patient Name: Herrera Holly    History of Presenting Illness     Chief Complaint   Patient presents with    Urinary Pain     Pt ambulatory into triage complaining of lower abd pain and dysuria Hx of UTI Pt states hx of UTI      History Provided By: Patient    HPI: Herrera Holly, 41 y.o. female with PMHx significant for hyperlipidemia, presents ambulatory to the ED with cc of gradually worsening abdominal pain alongside dysuria for the past three days. Per pt, she has been presenting with pain to the suprapubic abdomen with a moderate 8/10 intensity with an associated sore, aching sensation to the region. Pt additionally informs of dysuria. Pt states she has a history of chronic, recurrent UTIs secondary to a short ureter. Pt informs she has attempted to take Azo for her discomfort with mild relief. Pt requests antibiotics, specifically Ciprofloxacin, informing that works well to alleviate her symptoms and the infection normally. Pt specifically denies any nausea, vomiting, fevers, chills, further urinary complications, chest pain, or SOB. Social Hx: + EtOH; Former Smoker; - Illicit Drugs    PCP: Adolfo Corral MD    There are no other complaints, changes, or physical findings at this time. Current Facility-Administered Medications   Medication Dose Route Frequency Provider Last Rate Last Dose    ciprofloxacin HCl (CIPRO) tablet 500 mg  500 mg Oral NOW Saad Pizano [place]        phenazopyridine (PYRIDIUM) tablet 200 mg  200 mg Oral NOW LIUDMILA Sagastume         Current Outpatient Prescriptions   Medication Sig Dispense Refill    ciprofloxacin HCl (CIPRO) 500 mg tablet Take 1 Tab by mouth two (2) times a day for 7 days. 14 Tab 0    phenazopyridine (PYRIDIUM) 200 mg tablet Take 1 Tab by mouth three (3) times daily for 6 doses.  6 Tab 0     Past History     Past Medical History:  Past Medical History:   Diagnosis Date    Herniated cervical disc 2014    c4, c6    High cholesterol     Mononucleosis     Palpitations 2016     Past Surgical History:  Past Surgical History:   Procedure Laterality Date    HX HEENT      nose     Family History:  Family History   Problem Relation Age of Onset    Cancer Mother     Heart Disease Father      Social History:  Social History   Substance Use Topics    Smoking status: Former Smoker     Quit date: 3/7/2015    Smokeless tobacco: Former User     Quit date: 5/30/2015    Alcohol use 8.4 oz/week     14 Cans of beer per week     Allergies: Allergies   Allergen Reactions    Sulfa (Sulfonamide Antibiotics) Swelling     Review of Systems   Review of Systems   Constitutional: Negative for chills and fever. HENT: Negative for rhinorrhea and sore throat. Eyes: Negative for visual disturbance. Respiratory: Negative for cough and shortness of breath. Cardiovascular: Negative for chest pain. Gastrointestinal: Positive for abdominal pain. Negative for constipation, diarrhea, nausea and vomiting. Genitourinary: Positive for dysuria. Negative for frequency, hematuria and urgency. Musculoskeletal: Negative for back pain. Skin: Negative for wound. Neurological: Negative for headaches. All other systems reviewed and are negative. Physical Exam   Physical Exam   Constitutional: She is oriented to person, place, and time. She appears well-developed and well-nourished. No distress. Very pleasant 40 yo  female   HENT:   Head: Normocephalic and atraumatic. Eyes: Conjunctivae are normal. Right eye exhibits no discharge. Left eye exhibits no discharge. Neck: Normal range of motion. Neck supple. Cardiovascular: Normal rate, regular rhythm and normal heart sounds. No murmur heard. Pulmonary/Chest: Effort normal and breath sounds normal. No respiratory distress. She has no wheezes. Abdominal: Soft. Bowel sounds are normal. She exhibits no distension. There is no tenderness.  There is no rebound and no CVA tenderness. Neurological: She is alert and oriented to person, place, and time. Skin: Skin is warm and dry. She is not diaphoretic. Psychiatric: She has a normal mood and affect. Her behavior is normal.   Nursing note and vitals reviewed. Diagnostic Study Results     Labs -     Recent Results (from the past 12 hour(s))   URINALYSIS W/ RFLX MICROSCOPIC    Collection Time: 02/12/18  9:32 AM   Result Value Ref Range    Color DARK YELLOW      Appearance CLOUDY (A) CLEAR      Specific gravity 1.007 1.003 - 1.030      pH (UA) 5.5 5.0 - 8.0      Protein NEGATIVE  NEG mg/dL    Glucose NEGATIVE  NEG mg/dL    Ketone NEGATIVE  NEG mg/dL    Bilirubin NEGATIVE  NEG      Blood SMALL (A) NEG      Urobilinogen 1.0 0.2 - 1.0 EU/dL    Nitrites POSITIVE (A) NEG      Leukocyte Esterase LARGE (A) NEG      WBC 20-50 0 - 4 /hpf    RBC 0-5 0 - 5 /hpf    Epithelial cells FEW FEW /lpf    Bacteria NEGATIVE  NEG /hpf     Medical Decision Making   I am the first provider for this patient. I reviewed the vital signs, available nursing notes, past medical history, past surgical history, family history and social history. Vital Signs-Reviewed the patient's vital signs. Patient Vitals for the past 12 hrs:   Temp Resp BP SpO2   02/12/18 0921 97.4 °F (36.3 °C) 22 (!) 145/107 100 %     Pulse Oximetry Analysis - 100% on RA    Records Reviewed: Nursing Notes and Old Medical Records    Provider Notes (Medical Decision Making):   DDx: UTI, pyelonephritis, dysuria     ED Course:   Initial assessment performed. The patients presenting problems have been discussed, and they are in agreement with the care plan formulated and outlined with them. I have encouraged them to ask questions as they arise throughout their visit. Disposition:  DISCHARGE NOTE:    10:41 AM  The patient is ready for discharge.  The patient signs, symptoms, diagnosis, and discharge instructions have been discussed and the patient has conveyed their understanding. The patient is to follow-up as reccommended or returned to the ER should their symptoms worsen. Plan has been discussed and patient is in agreement. PLAN:  1. Current Discharge Medication List      START taking these medications    Details   ciprofloxacin HCl (CIPRO) 500 mg tablet Take 1 Tab by mouth two (2) times a day for 7 days. Qty: 14 Tab, Refills: 0      phenazopyridine (PYRIDIUM) 200 mg tablet Take 1 Tab by mouth three (3) times daily for 6 doses. Qty: 6 Tab, Refills: 0           2. Follow-up Information     Follow up With Details Comments Contact Info    Orlando Cueto MD  As needed [address]  [address] [address] [phone]          Return to ED if worse     Diagnosis     Clinical Impression:   1. Urinary tract infection without hematuria, site unspecified      Attestations: This note is prepared by Giuliana Huddleston, acting as Scribe for MARY LOU Womack: The scribe's documentation has been prepared under my direction and personally reviewed by me in its entirety. I confirm that the note above accurately reflects all work, treatment, procedures, and medical decision making performed by me.

## 2018-02-12 NOTE — LETTER
[address] 
[facility] EMERGENCY DEPT 
[address] [place] [address] [address] [id number] [phone] Work/School Note Date: 2/12/2018 To Whom It May concern: 
 
Lucie Fat was seen and treated today in the emergency room by the following provider(s): 
Attending Provider: Migdalia Camacho MD 
Physician Assistant: LIUDMILA Chandler. Please excuse Lucie Fat from work today. Sincerely, Binh Oliva, [address]

## 2018-05-23 ENCOUNTER — HOSPITAL ENCOUNTER (EMERGENCY)
Age: 48
Discharge: HOME | End: 2018-05-23
Attending: EMERGENCY MEDICINE | Admitting: EMERGENCY MEDICINE
Payer: SUBSIDIZED

## 2018-05-23 ENCOUNTER — APPOINTMENT (OUTPATIENT)
Dept: ULTRASOUND IMAGING | Age: 48
End: 2018-05-23
Attending: EMERGENCY MEDICINE
Payer: SUBSIDIZED

## 2018-05-23 VITALS
OXYGEN SATURATION: 100 % | HEIGHT: 64 IN | WEIGHT: 163.36 LBS | SYSTOLIC BLOOD PRESSURE: 123 MMHG | TEMPERATURE: 98.5 F | BODY MASS INDEX: 27.89 KG/M2 | DIASTOLIC BLOOD PRESSURE: 96 MMHG | HEART RATE: 65 BPM | RESPIRATION RATE: 16 BRPM

## 2018-05-23 DIAGNOSIS — N83.202 CYST OF LEFT OVARY: ICD-10-CM

## 2018-05-23 DIAGNOSIS — R10.2 PELVIC PAIN: Primary | ICD-10-CM

## 2018-05-23 LAB
ALBUMIN SERPL-MCNC: 3.9 G/DL (ref 3.5–5)
ALBUMIN/GLOB SERPL: 1.1 {RATIO} (ref 1.1–2.2)
ALP SERPL-CCNC: 79 U/L (ref 45–117)
ALT SERPL-CCNC: 22 U/L (ref 12–78)
ANION GAP SERPL CALC-SCNC: 5 MMOL/L (ref 5–15)
APPEARANCE UR: CLEAR
AST SERPL-CCNC: 13 U/L (ref 15–37)
BACTERIA URNS QL MICRO: NEGATIVE /HPF
BASOPHILS # BLD: 0.1 K/UL (ref 0–0.1)
BASOPHILS NFR BLD: 1 % (ref 0–1)
BILIRUB SERPL-MCNC: 0.5 MG/DL (ref 0.2–1)
BILIRUB UR QL: NEGATIVE
BUN SERPL-MCNC: 6 MG/DL (ref 6–20)
BUN/CREAT SERPL: 8 (ref 12–20)
CALCIUM SERPL-MCNC: 8.8 MG/DL (ref 8.5–10.1)
CHLORIDE SERPL-SCNC: 108 MMOL/L (ref 97–108)
CO2 SERPL-SCNC: 26 MMOL/L (ref 21–32)
COLOR UR: NORMAL
CREAT SERPL-MCNC: 0.78 MG/DL (ref 0.55–1.02)
DIFFERENTIAL METHOD BLD: NORMAL
EOSINOPHIL # BLD: 0.2 K/UL (ref 0–0.4)
EOSINOPHIL NFR BLD: 3 % (ref 0–7)
EPITH CASTS URNS QL MICRO: NORMAL /LPF
ERYTHROCYTE [DISTWIDTH] IN BLOOD BY AUTOMATED COUNT: 12.5 % (ref 11.5–14.5)
GLOBULIN SER CALC-MCNC: 3.6 G/DL (ref 2–4)
GLUCOSE SERPL-MCNC: 82 MG/DL (ref 65–100)
GLUCOSE UR STRIP.AUTO-MCNC: NEGATIVE MG/DL
HCT VFR BLD AUTO: 41.4 % (ref 35–47)
HGB BLD-MCNC: 13.9 G/DL (ref 11.5–16)
HGB UR QL STRIP: NEGATIVE
HYALINE CASTS URNS QL MICRO: NORMAL /LPF (ref 0–5)
IMM GRANULOCYTES # BLD: 0 K/UL (ref 0–0.04)
IMM GRANULOCYTES NFR BLD AUTO: 0 % (ref 0–0.5)
KETONES UR QL STRIP.AUTO: NEGATIVE MG/DL
LEUKOCYTE ESTERASE UR QL STRIP.AUTO: NEGATIVE
LYMPHOCYTES # BLD: 1.9 K/UL (ref 0.8–3.5)
LYMPHOCYTES NFR BLD: 32 % (ref 12–49)
MCH RBC QN AUTO: 30.8 PG (ref 26–34)
MCHC RBC AUTO-ENTMCNC: 33.6 G/DL (ref 30–36.5)
MCV RBC AUTO: 91.8 FL (ref 80–99)
MONOCYTES # BLD: 0.6 K/UL (ref 0–1)
MONOCYTES NFR BLD: 10 % (ref 5–13)
NEUTS SEG # BLD: 3.1 K/UL (ref 1.8–8)
NEUTS SEG NFR BLD: 53 % (ref 32–75)
NITRITE UR QL STRIP.AUTO: NEGATIVE
NRBC # BLD: 0 K/UL (ref 0–0.01)
NRBC BLD-RTO: 0 PER 100 WBC
PH UR STRIP: 6.5 [PH] (ref 5–8)
PLATELET # BLD AUTO: 287 K/UL (ref 150–400)
PMV BLD AUTO: 10.8 FL (ref 8.9–12.9)
POTASSIUM SERPL-SCNC: 4.9 MMOL/L (ref 3.5–5.1)
PROT SERPL-MCNC: 7.5 G/DL (ref 6.4–8.2)
PROT UR STRIP-MCNC: NEGATIVE MG/DL
RBC # BLD AUTO: 4.51 M/UL (ref 3.8–5.2)
RBC #/AREA URNS HPF: NORMAL /HPF (ref 0–5)
SODIUM SERPL-SCNC: 139 MMOL/L (ref 136–145)
SP GR UR REFRACTOMETRY: 1.01 (ref 1–1.03)
UA: UC IF INDICATED,UAUC: NORMAL
UROBILINOGEN UR QL STRIP.AUTO: 0.2 EU/DL (ref 0.2–1)
WBC # BLD AUTO: 5.8 K/UL (ref 3.6–11)
WBC URNS QL MICRO: NORMAL /HPF (ref 0–4)

## 2018-05-23 PROCEDURE — 99283 EMERGENCY DEPT VISIT LOW MDM: CPT

## 2018-05-23 PROCEDURE — 81001 URINALYSIS AUTO W/SCOPE: CPT | Performed by: PHYSICIAN ASSISTANT

## 2018-05-23 PROCEDURE — 36415 COLL VENOUS BLD VENIPUNCTURE: CPT | Performed by: EMERGENCY MEDICINE

## 2018-05-23 PROCEDURE — 85025 COMPLETE CBC W/AUTO DIFF WBC: CPT | Performed by: EMERGENCY MEDICINE

## 2018-05-23 PROCEDURE — 76830 TRANSVAGINAL US NON-OB: CPT

## 2018-05-23 PROCEDURE — 80053 COMPREHEN METABOLIC PANEL: CPT | Performed by: EMERGENCY MEDICINE

## 2018-05-23 RX ORDER — NAPROXEN 500 MG/1
500 TABLET ORAL 2 TIMES DAILY WITH MEALS
Qty: 20 TAB | Refills: 0 | Status: SHIPPED | OUTPATIENT
Start: 2018-05-23 | End: 2018-06-02

## 2018-05-23 NOTE — ED NOTES
Pt resting on stretcher in POC with call bell in reach. Pt remains on monitor x 2. VSS at this time.

## 2018-05-23 NOTE — ED NOTES
Pt discharged by Dr. Ludy Alexis. Pt provided with discharge instructions Rx and instructions on follow up care. Pt out of ED ambulatory without difficulty.

## 2018-05-23 NOTE — ED PROVIDER NOTES
EMERGENCY DEPARTMENT HISTORY AND PHYSICAL EXAM      Date: 5/23/2018  Patient Name: Shandra Botello    History of Presenting Illness     Chief Complaint   Patient presents with    Abdominal Pain     She has fibroid and thinks she is getting the abdominal pain from this. Lower front and back. She does a lot of heavy lifting at work and her boss wanted her to come see someone about it. History Provided By: Patient    HPI: Shandra Botello, 41 y.o. female with PMHx significant for mononucleosis and hyperlipidemia, presents ambulatory to the ED with cc of gradual onset of abdominal and back pain for the past four days. Per pt, she was at work and was lifting a ten foot ladder after which she began to present with increased discomfort to the abdomen and back. Per pt, her abdominal pain presents with a moderate 5/10 intensity alongside an associated sharp, aching sensation to the suprapubic abdomen, left greater than right. Pt informs the discomfort radiates into her bilateral lower back as well. Pt informs her discomfort presents without any notable modifying factors. Pt reports she experiences a similar discomfort when coming onto her cycle. Pt additionally informs the discomfort is similar to her fibroid discomfort as well. Pt informs she likely believes she exacerbated her fibroids with the lifting resulting in her discomfort. Pt reports she likely would not have come to the ED today if her boss had not forced her to. Pt reports no OTC medications or other modifying factors for her discomfort. Pt specifically denies any fevers, chills, nausea, vomiting, urinary complications, chest pain, or SOB. PMHx: herniated cervical disc  Social Hx: + EtOH; Former Smoker; - Illicit Drugs    PCP: Eleazar Etienne MD    There are no other complaints, changes, or physical findings at this time.     Past History     Past Medical History:  Past Medical History:   Diagnosis Date    Herniated cervical disc 2014    c4, c6    High cholesterol     Mononucleosis     Palpitations 2016     Past Surgical History:  Past Surgical History:   Procedure Laterality Date    HX HEENT      nose     Family History:  Family History   Problem Relation Age of Onset    Cancer Mother     Heart Disease Father      Social History:  Social History   Substance Use Topics    Smoking status: Former Smoker     Quit date: 3/7/2015    Smokeless tobacco: Former User     Quit date: 5/30/2015    Alcohol use 8.4 oz/week     14 Cans of beer per week     Allergies: Allergies   Allergen Reactions    Sulfa (Sulfonamide Antibiotics) Swelling     Review of Systems   Review of Systems   Constitutional: Negative. Negative for chills and fever. HENT: Negative. Negative for congestion and rhinorrhea. Respiratory: Negative. Negative for cough, chest tightness and wheezing. Cardiovascular: Negative. Negative for chest pain and palpitations. Gastrointestinal: Positive for abdominal pain. Negative for constipation, nausea and vomiting. Endocrine: Negative. Genitourinary: Negative. Negative for decreased urine volume, dysuria, flank pain, hematuria, pelvic pain and urgency. Musculoskeletal: Positive for back pain. Negative for neck pain. Skin: Negative. Negative for color change, pallor and rash. Neurological: Negative. Negative for dizziness, seizures, weakness, numbness and headaches. Hematological: Negative. Negative for adenopathy. Psychiatric/Behavioral: Negative. All other systems reviewed and are negative. Physical Exam   Physical Exam   Constitutional: She is oriented to person, place, and time. She appears well-developed and well-nourished. No distress. HENT:   Head: Normocephalic and atraumatic. Mouth/Throat: No oropharyngeal exudate. Eyes: Conjunctivae are normal. Pupils are equal, round, and reactive to light. Right eye exhibits no discharge. Left eye exhibits no discharge. No scleral icterus.    Neck: Normal range of motion. Neck supple. No JVD present. Cardiovascular: Normal rate, regular rhythm, normal heart sounds and intact distal pulses. Exam reveals no gallop and no friction rub. No murmur heard. Pulmonary/Chest: Effort normal and breath sounds normal. No stridor. No respiratory distress. She has no wheezes. She has no rales. She exhibits no tenderness. Abdominal: Soft. Bowel sounds are normal. She exhibits no distension and no mass. There is tenderness in the suprapubic area. There is no rebound and no guarding. Neurological: She is alert and oriented to person, place, and time. She displays normal reflexes. No cranial nerve deficit. She exhibits normal muscle tone. Coordination normal.   Skin: Skin is warm. No rash noted. She is not diaphoretic. No pallor. Vitals reviewed.     Diagnostic Study Results   Labs -     Recent Results (from the past 12 hour(s))   URINALYSIS W/ REFLEX CULTURE    Collection Time: 05/23/18  1:08 PM   Result Value Ref Range    Color YELLOW/STRAW      Appearance CLEAR CLEAR      Specific gravity 1.007 1.003 - 1.030      pH (UA) 6.5 5.0 - 8.0      Protein NEGATIVE  NEG mg/dL    Glucose NEGATIVE  NEG mg/dL    Ketone NEGATIVE  NEG mg/dL    Bilirubin NEGATIVE  NEG      Blood NEGATIVE  NEG      Urobilinogen 0.2 0.2 - 1.0 EU/dL    Nitrites NEGATIVE  NEG      Leukocyte Esterase NEGATIVE  NEG      WBC 0-4 0 - 4 /hpf    RBC 0-5 0 - 5 /hpf    Epithelial cells FEW FEW /lpf    Bacteria NEGATIVE  NEG /hpf    UA:UC IF INDICATED CULTURE NOT INDICATED BY UA RESULT CNI      Hyaline cast 0-2 0 - 5 /lpf   CBC WITH AUTOMATED DIFF    Collection Time: 05/23/18  2:55 PM   Result Value Ref Range    WBC 5.8 3.6 - 11.0 K/uL    RBC 4.51 3.80 - 5.20 M/uL    HGB 13.9 11.5 - 16.0 g/dL    HCT 41.4 35.0 - 47.0 %    MCV 91.8 80.0 - 99.0 FL    MCH 30.8 26.0 - 34.0 PG    MCHC 33.6 30.0 - 36.5 g/dL    RDW 12.5 11.5 - 14.5 %    PLATELET 851 478 - 560 K/uL    MPV 10.8 8.9 - 12.9 FL    NRBC 0.0 0  WBC    ABSOLUTE NRBC 0. 00 0.00 - 0.01 K/uL    NEUTROPHILS 53 32 - 75 %    LYMPHOCYTES 32 12 - 49 %    MONOCYTES 10 5 - 13 %    EOSINOPHILS 3 0 - 7 %    BASOPHILS 1 0 - 1 %    IMMATURE GRANULOCYTES 0 0.0 - 0.5 %    ABS. NEUTROPHILS 3.1 1.8 - 8.0 K/UL    ABS. LYMPHOCYTES 1.9 0.8 - 3.5 K/UL    ABS. MONOCYTES 0.6 0.0 - 1.0 K/UL    ABS. EOSINOPHILS 0.2 0.0 - 0.4 K/UL    ABS. BASOPHILS 0.1 0.0 - 0.1 K/UL    ABS. IMM. GRANS. 0.0 0.00 - 0.04 K/UL    DF AUTOMATED     METABOLIC PANEL, COMPREHENSIVE    Collection Time: 05/23/18  2:55 PM   Result Value Ref Range    Sodium 139 136 - 145 mmol/L    Potassium 4.9 3.5 - 5.1 mmol/L    Chloride 108 97 - 108 mmol/L    CO2 26 21 - 32 mmol/L    Anion gap 5 5 - 15 mmol/L    Glucose 82 65 - 100 mg/dL    BUN 6 6 - 20 MG/DL    Creatinine 0.78 0.55 - 1.02 MG/DL    BUN/Creatinine ratio 8 (L) 12 - 20      GFR est AA >60 >60 ml/min/1.73m2    GFR est non-AA >60 >60 ml/min/1.73m2    Calcium 8.8 8.5 - 10.1 MG/DL    Bilirubin, total 0.5 0.2 - 1.0 MG/DL    ALT (SGPT) 22 12 - 78 U/L    AST (SGOT) 13 (L) 15 - 37 U/L    Alk. phosphatase 79 45 - 117 U/L    Protein, total 7.5 6.4 - 8.2 g/dL    Albumin 3.9 3.5 - 5.0 g/dL    Globulin 3.6 2.0 - 4.0 g/dL    A-G Ratio 1.1 1.1 - 2.2       Radiologic Studies -   US TRANSVAGINAL   Final Result        INDICATION:  Left-sided pelvic pain for 2 days. Pregnancy status unknown     EXAM: Transvaginal pelvic ultrasound. Comparison April 11, 2016     FINDINGS: Uterus measures 5.8 x 2.9 x 3.4 cm. Echotexture is within normal  limits. Endometrial stripe measures 9 mm. Small nabothian cyst within the cervix     Right ovary measures 2.7 x 1.5 x 2.3 cm with normal blood flow. Left ovary  measures 3 x 1.8 x 2.6 cm with a somewhat complex 1.7 x 1.6 x 1.2 cm complex  cyst. Blood flow is normal.     No adnexal mass or free fluid     IMPRESSION  IMPRESSION:  1. Normal appearance to the uterus and right ovary. 2. 1.7 cm complex cyst within the left ovary.  It may be a collapsing cyst.  Medical Decision Making   I am the first provider for this patient. I reviewed the vital signs, available nursing notes, past medical history, past surgical history, family history and social history. Vital Signs-Reviewed the patient's vital signs. Patient Vitals for the past 12 hrs:   Temp Pulse Resp BP SpO2   05/23/18 1302 98.5 °F (36.9 °C) 65 16 (!) 123/96 100 %     Pulse Oximetry Analysis - 100% on RA    Cardiac Monitor:   Rate: 65 bpm  Rhythm: Normal Sinus Rhythm      Records Reviewed: Nursing Notes and Old Medical Records    Provider Notes (Medical Decision Making):   DDx: MSK pain, ovarian cyst, ovarian torsion, ectopic pregnancy, renal colic, fibroids, appendicitis, diverticulitis     Pt with history of fibroids to the ED with feelings of menstrual pain as well as lower back pain which worsened when lifting heavy items at work. US shows ovarian cyst but no fibroids. Plan for treatment if pain with close follow up with OB. ED Course:   Initial assessment performed. The patients presenting problems have been discussed, and they are in agreement with the care plan formulated and outlined with them. I have encouraged them to ask questions as they arise throughout their visit. Disposition:  DISCHARGE NOTE:    3:35 PM  The patient is ready for discharge. The patient signs, symptoms, diagnosis, and discharge instructions have been discussed and the patient has conveyed their understanding. The patient is to follow-up as reccommended or returned to the ER should their symptoms worsen. Plan has been discussed and patient is in agreement. PLAN:  1. Current Discharge Medication List      START taking these medications    Details   naproxen (NAPROSYN) 500 mg tablet Take 1 Tab by mouth two (2) times daily (with meals) for 10 days. Qty: 20 Tab, Refills: 0    Associated Diagnoses: Pelvic pain; Cyst of left ovary           2.    Follow-up Information     Follow up With Details Comments Kayla 197, MD Schedule an appointment as soon as possible for a visit in 1 day  [address]  [place]  [phone]      [facility] EMERGENCY DEPT  If symptoms worsen [address] [place] [address]  [phone]        Return to ED if worse     Diagnosis     Clinical Impression:   1. Pelvic pain    2. Cyst of left ovary      Attestations: This note is prepared by Kayy Miles, acting as Scribe for Arthur Phoenix MD.    Arthur Pheonix MD: The scribe's documentation has been prepared under my direction and personally reviewed by me in its entirety. I confirm that the note above accurately reflects all work, treatment, procedures, and medical decision making performed by me. This note will not be viewable in [address]

## 2018-05-23 NOTE — DISCHARGE INSTRUCTIONS
Functional Ovarian Cyst: Care Instructions  Your Care Instructions    A functional ovarian cyst is a sac that forms on the surface of a woman's ovary during ovulation. The sac holds a maturing egg. Usually the sac goes away after the egg is released. But if the egg is not released, or if the sac closes up after the egg is released, the sac can swell up with fluid and form a cyst.  Functional ovarian cysts are different than ovarian growths caused by other problems, such as cancer. Most functional ovarian cysts cause no symptoms and go away on their own. Some cause mild pain. Others can cause severe pain when they rupture or bleed. Follow-up care is a key part of your treatment and safety. Be sure to make and go to all appointments, and call your doctor if you are having problems. It's also a good idea to know your test results and keep a list of the medicines you take. How can you care for yourself at home? · Use heat, such as a hot water bottle, a heating pad set on low, or a warm bath, to relax tense muscles and relieve cramping. · Be safe with medicines. Take pain medicines exactly as directed. ¨ If the doctor gave you a prescription medicine for pain, take it as prescribed. ¨ If you are not taking a prescription pain medicine, ask your doctor if you can take an over-the-counter medicine. · Avoid constipation. Make sure you drink enough fluids and include fruits, vegetables, and fiber in your diet each day. Constipation does not cause ovarian cysts, but it may make your pelvic pain worse. When should you call for help? Call your doctor now or seek immediate medical care if:  ? · You have severe vaginal bleeding. ? · You have new or worse belly or pelvic pain. ? Watch closely for changes in your health, and be sure to contact your doctor if:  ? · You have unusual vaginal bleeding. ? · You do not get better as expected. Where can you learn more?   Go to http://franki-vikram.info/. Enter B425 in the search box to learn more about \"Functional Ovarian Cyst: Care Instructions. \"  Current as of: 2016  Content Version: 11.4  © 6517-6828 [employer]. Care instructions adapted under license by [employer] (which disclaims liability or warranty for this information). If you have questions about a medical condition or this instruction, always ask your healthcare professional. [place] [address] any warranty or liability for your use of this information. [employer] Activation    Thank you for requesting access to [employer]. Please follow the instructions below to securely access and download your online medical record. [employer] allows you to send messages to your doctor, view your test results, renew your prescriptions, schedule appointments, and more. How Do I Sign Up? 1. In your internet browser, go to [email or web address]  2. Click on the First Time User? Click Here link in the Sign In box. You will be redirect to the New Member Sign Up page. 3. Enter your [employer] Access Code exactly as it appears below. You will not need to use this code after youve completed the sign-up process. If you do not sign up before the expiration date, you must request a new code. [employer] Access Code: [email or web address]  Expires: 2018  1:03 PM (This is the date your [employer] access code will )    4. Enter the last four digits of your Social Security Number (xxxx) and Date of Birth (mm/dd/yyyy) as indicated and click Submit. You will be taken to the next sign-up page. 5. Create a [employer] ID. This will be your [employer] login ID and cannot be changed, so think of one that is secure and easy to remember. 6. Create a [employer] password. You can change your password at any time. 7. Enter your Password Reset Question and Answer. This can be used at a later time if you forget your password.    8. Enter your e-mail address. You will receive e-mail notification when new information is available in [address] 9. Click Sign Up. You can now view and download portions of your medical record. 10. Click the Download Summary menu link to download a portable copy of your medical information. Additional Information    If you have questions, please visit the Frequently Asked Questions section of the [employer] website at [email or web address] Remember, [employer] is NOT to be used for urgent needs. For medical emergencies, dial 911.

## 2018-05-23 NOTE — LETTER
[address] 
[facility] EMERGENCY DEPT 
[address] [place] [address] [address] [id number] [phone] Work/School Note Date: 5/23/2018 To Whom It May concern: 
 
Molly Morales was seen and treated today in the emergency room by the following provider(s): 
Attending Provider: Gemini Thurman MD. Molly Morales No work till 5/25/18 Sincerely, Gemini Thurman MD

## 2018-05-23 NOTE — ED TRIAGE NOTES
Assumed care of pt from triage. Pt is A&O x 4. Pt reports CC of lower abdominal pain since Sunday. Pt reports she has fibroids and felt like her period was starting. Pt reports he boss requested she come to get checked out. Pt resting on side chair on monitor x 2.  VSS

## 2018-05-23 NOTE — LETTER
[address] 
[facility] EMERGENCY DEPT 
[address] [facility] [address] [id number] 
[phone] Work/School Note Date: 5/23/2018 To Whom It May concern: 
 
Jaleesa Izquierdo was seen and treated today in the emergency room by the following provider(s): 
Attending Provider: Sally Castro MD. Jaleesa Izquierdo may return to work on 5/24/2018. Sincerely, Sally Castro MD

## 2018-07-10 ENCOUNTER — HOSPITAL ENCOUNTER (OUTPATIENT)
Dept: LAB | Age: 48
Discharge: HOME | End: 2018-07-10

## 2018-07-10 PROCEDURE — 81206 BCR/ABL1 GENE MAJOR BP: CPT

## 2018-07-10 PROCEDURE — 81207 BCR/ABL1 GENE MINOR BP: CPT

## 2018-08-17 ENCOUNTER — HOSPITAL ENCOUNTER (OUTPATIENT)
Dept: MAMMOGRAPHY | Age: 48
Discharge: HOME | End: 2018-08-17
Attending: SPECIALIST
Payer: SELF-PAY

## 2018-08-17 DIAGNOSIS — Z12.39 SCREENING BREAST EXAMINATION: ICD-10-CM

## 2018-08-17 PROCEDURE — 77067 SCR MAMMO BI INCL CAD: CPT

## 2018-12-09 ENCOUNTER — HOSPITAL ENCOUNTER (EMERGENCY)
Age: 48
Discharge: HOME | End: 2018-12-09
Attending: EMERGENCY MEDICINE
Payer: SELF-PAY

## 2018-12-09 VITALS
SYSTOLIC BLOOD PRESSURE: 128 MMHG | RESPIRATION RATE: 16 BRPM | OXYGEN SATURATION: 97 % | WEIGHT: 167.99 LBS | BODY MASS INDEX: 28.84 KG/M2 | HEART RATE: 78 BPM | DIASTOLIC BLOOD PRESSURE: 87 MMHG | TEMPERATURE: 97.9 F

## 2018-12-09 DIAGNOSIS — N30.01 ACUTE CYSTITIS WITH HEMATURIA: Primary | ICD-10-CM

## 2018-12-09 LAB
APPEARANCE UR: ABNORMAL
BACTERIA URNS QL MICRO: NEGATIVE /HPF
BILIRUB UR QL: NEGATIVE
COLOR UR: ABNORMAL
EPITH CASTS URNS QL MICRO: ABNORMAL /LPF
GLUCOSE UR STRIP.AUTO-MCNC: NEGATIVE MG/DL
HGB UR QL STRIP: ABNORMAL
HYALINE CASTS URNS QL MICRO: ABNORMAL /LPF (ref 0–5)
KETONES UR QL STRIP.AUTO: NEGATIVE MG/DL
LEUKOCYTE ESTERASE UR QL STRIP.AUTO: ABNORMAL
NITRITE UR QL STRIP.AUTO: POSITIVE
PH UR STRIP: 5 [PH] (ref 5–8)
PROT UR STRIP-MCNC: NEGATIVE MG/DL
RBC #/AREA URNS HPF: ABNORMAL /HPF (ref 0–5)
SP GR UR REFRACTOMETRY: 1 (ref 1–1.03)
UA: UC IF INDICATED,UAUC: ABNORMAL
UROBILINOGEN UR QL STRIP.AUTO: 0.2 EU/DL (ref 0.2–1)
WBC URNS QL MICRO: ABNORMAL /HPF (ref 0–4)

## 2018-12-09 PROCEDURE — 81001 URINALYSIS AUTO W/SCOPE: CPT

## 2018-12-09 PROCEDURE — 99283 EMERGENCY DEPT VISIT LOW MDM: CPT

## 2018-12-09 PROCEDURE — 87086 URINE CULTURE/COLONY COUNT: CPT

## 2018-12-09 RX ORDER — CIPROFLOXACIN 500 MG/1
500 TABLET ORAL 2 TIMES DAILY
Qty: 14 TAB | Refills: 0 | Status: SHIPPED | OUTPATIENT
Start: 2018-12-09 | End: 2018-12-16

## 2018-12-09 NOTE — ED NOTES
The patient presents to the ED with complaints of a UTI. Patient states that she gets chronic UTI's and the only thing that works is CIPRO. Patient is requesting not to to take anything else. Discussing with NP, Kayla Bautista at this time.

## 2018-12-09 NOTE — ED PROVIDER NOTES
EMERGENCY DEPARTMENT HISTORY AND PHYSICAL EXAM 
 
 
Date: 12/9/2018 Patient Name: Trevor Flores History of Presenting Illness Chief Complaint Patient presents with  Urinary Frequency Ambulatory into the ED with c/o urinary frequency x this am. Reports, \"I have had UTI's since I was a kid. \" No distress noted. History Provided By: Patient HPI: Trevor Flores, 57 y.o. female with PMHx significant for frequent UTIs, presents ambulatory from home to the ED with cc of Pelvic pressure, burning with urination, but urination, and urgency. History of recurrent urinary tract infections. She states that her urinary tract infection and only responded to Cipro. She is currently taking is with much relief. She denies any vaginal discharge, bleeding, vaginal lesions, or recent exposure to such an infection. Pt denies fevers, chills, night sweats, chest pain, pressure, SOB, ESTES, PND, orthopnea, abdominal pain, n/v/d, melena, hematuria, dysuria, constipation, HA, dizziness, and syncope PCP: Marleny Estrada MD 
 
Current Outpatient Medications Medication Sig Dispense Refill  ciprofloxacin HCl (CIPRO) 500 mg tablet Take 1 Tab by mouth two (2) times a day for 7 days. 14 Tab 0 Past History Past Medical History: 
Past Medical History:  
Diagnosis Date  Herniated cervical disc 2014  
 c4, c6  
 High cholesterol  Mononucleosis  Palpitations 2016 Past Surgical History: 
Past Surgical History:  
Procedure Laterality Date  HX HEENT    
 nose Family History: 
Family History Problem Relation Age of Onset  Cancer Mother  Heart Disease Father  Breast Cancer Sister 52  
 Breast Cancer Maternal Aunt Social History: 
Social History Tobacco Use  Smoking status: Former Smoker Last attempt to quit: 3/7/2015 Years since quitting: 3.7  Smokeless tobacco: Former User Quit date: 5/30/2015 Substance Use Topics  Alcohol use:  Yes  
 Alcohol/week: 8.4 oz Types: 14 Cans of beer per week  Drug use: No  
 
 
Allergies: Allergies Allergen Reactions  Sulfa (Sulfonamide Antibiotics) Swelling Review of Systems Review of Systems Constitutional: Negative for activity change, appetite change, chills, diaphoresis, fatigue, fever and unexpected weight change. HENT: Negative for congestion, ear pain, rhinorrhea, sinus pressure, sore throat and tinnitus. Eyes: Negative for photophobia, pain, discharge and visual disturbance. Respiratory: Negative for apnea, cough, choking, chest tightness, shortness of breath, wheezing and stridor. Cardiovascular: Negative for chest pain, palpitations and leg swelling. Gastrointestinal: Negative for abdominal pain, constipation, diarrhea, nausea and vomiting. Endocrine: Negative for polydipsia, polyphagia and polyuria. Genitourinary: Positive for dysuria, frequency and urgency. Negative for decreased urine volume, dyspareunia, enuresis, flank pain and hematuria. Musculoskeletal: Negative for arthralgias, back pain, gait problem, myalgias and neck pain. Skin: Negative for color change, pallor, rash and wound. Allergic/Immunologic: Negative for immunocompromised state. Neurological: Negative for dizziness, seizures, syncope, weakness, light-headedness and headaches. Hematological: Does not bruise/bleed easily. Psychiatric/Behavioral: Negative for agitation and confusion. The patient is not nervous/anxious. Physical Exam  
Physical Exam  
Constitutional: She is oriented to person, place, and time. She appears well-developed and well-nourished. No distress. HENT:  
Head: Normocephalic. Right Ear: External ear normal.  
Left Ear: External ear normal.  
Mouth/Throat: Oropharynx is clear and moist. No oropharyngeal exudate. Eyes: Conjunctivae and EOM are normal. Pupils are equal, round, and reactive to light. Right eye exhibits no discharge.  Left eye exhibits no discharge. No scleral icterus. Neck: Normal range of motion. Neck supple. No JVD present. No tracheal deviation present. No thyromegaly present. Cardiovascular: Normal rate, regular rhythm, normal heart sounds and intact distal pulses. Exam reveals no gallop and no friction rub. No murmur heard. Pulmonary/Chest: Effort normal and breath sounds normal. No stridor. No respiratory distress. She has no wheezes. She has no rales. She exhibits no tenderness. Abdominal: Soft. Bowel sounds are normal. She exhibits no distension and no mass. There is no tenderness. There is no rebound and no guarding. Musculoskeletal: Normal range of motion. She exhibits no edema or tenderness. Lymphadenopathy:  
  She has no cervical adenopathy. Neurological: She is alert and oriented to person, place, and time. She displays normal reflexes. No cranial nerve deficit. Coordination normal.  
Skin: Skin is warm and dry. No rash noted. She is not diaphoretic. No erythema. No pallor. Psychiatric: She has a normal mood and affect. Her behavior is normal. Judgment and thought content normal.  
Nursing note and vitals reviewed. Diagnostic Study Results Labs - Recent Results (from the past 12 hour(s)) URINALYSIS W/ REFLEX CULTURE Collection Time: 12/09/18 10:35 AM  
Result Value Ref Range Color ZURI Appearance CLOUDY (A) CLEAR Specific gravity 1.005 1.003 - 1.030    
 pH (UA) 5.0 5.0 - 8.0 Protein NEGATIVE  NEG mg/dL Glucose NEGATIVE  NEG mg/dL Ketone NEGATIVE  NEG mg/dL Bilirubin NEGATIVE  NEG Blood SMALL (A) NEG Urobilinogen 0.2 0.2 - 1.0 EU/dL Nitrites POSITIVE (A) NEG Leukocyte Esterase MODERATE (A) NEG    
 UA:UC IF INDICATED URINE CULTURE ORDERED (A) CNI    
 WBC 20-50 0 - 4 /hpf  
 RBC 0-5 0 - 5 /hpf Epithelial cells FEW FEW /lpf Bacteria NEGATIVE  NEG /hpf Hyaline cast 0-2 0 - 5 /lpf Radiologic Studies - No orders to display CT Results  (Last 48 hours) None CXR Results  (Last 48 hours) None Medical Decision Making I am the first provider for this patient. I reviewed the vital signs, available nursing notes, past medical history, past surgical history, family history and social history. Vital Signs-Reviewed the patient's vital signs. Patient Vitals for the past 12 hrs: 
 Temp Pulse Resp BP SpO2  
12/09/18 1002 97.9 °F (36.6 °C) 78 16 128/87 97 % Pulse Oximetry Analysis - 97% on RA Cardiac Monitor:  
Rate: 78 bpm 
 
Records Reviewed: Nursing Notes and Old Medical Records Provider Notes: UTI 
 
 
ED Course:  
Initial assessment performed. The patients presenting problems have been discussed, and they are in agreement with the care plan formulated and outlined with them. I have encouraged them to ask questions as they arise throughout their visit. Stable, ambulatory pt in [facility] Critical Care Time:  
0 Disposition: 
Discharge to home with PCP and Urology follow up PLAN: 
1. Discharge Medication List as of 12/9/2018 12:11 PM  
  
START taking these medications Details  
ciprofloxacin HCl (CIPRO) 500 mg tablet Take 1 Tab by mouth two (2) times a day for 7 days. , Print, Disp-14 Tab, R-0  
  
  
 
2. Follow-up Information Follow up With Specialties Details Why Contact Info Kendal Lovell MD Family Practice In 2 days  [address] 
[address] [facility] [address] 
[phone] [facility] EMERGENCY DEPT Emergency Medicine  As needed, If symptoms worsen [address] [facility] 
[phone] Return to ED if worse Diagnosis Clinical Impression: 1. Acute cystitis with hematuria Attestations: 
 
Juan Lugo NP 
2:21 PM

## 2018-12-09 NOTE — DISCHARGE INSTRUCTIONS
Urinary Tract Infection in Women: Care Instructions  Your Care Instructions    A urinary tract infection, or UTI, is a general term for an infection anywhere between the kidneys and the urethra (where urine comes out). Most UTIs are bladder infections. They often cause pain or burning when you urinate. UTIs are caused by bacteria and can be cured with antibiotics. Be sure to complete your treatment so that the infection goes away. Follow-up care is a key part of your treatment and safety. Be sure to make and go to all appointments, and call your doctor if you are having problems. It's also a good idea to know your test results and keep a list of the medicines you take. How can you care for yourself at home? · Take your antibiotics as directed. Do not stop taking them just because you feel better. You need to take the full course of antibiotics. · Drink extra water and other fluids for the next day or two. This may help wash out the bacteria that are causing the infection. (If you have kidney, heart, or liver disease and have to limit fluids, talk with your doctor before you increase your fluid intake.)  · Avoid drinks that are carbonated or have caffeine. They can irritate the bladder. · Urinate often. Try to empty your bladder each time. · To relieve pain, take a hot bath or lay a heating pad set on low over your lower belly or genital area. Never go to sleep with a heating pad in place. To prevent UTIs  · Drink plenty of water each day. This helps you urinate often, which clears bacteria from your system. (If you have kidney, heart, or liver disease and have to limit fluids, talk with your doctor before you increase your fluid intake.)  · Urinate when you need to. · Urinate right after you have sex. · Change sanitary pads often. · Avoid douches, bubble baths, feminine hygiene sprays, and other feminine hygiene products that have deodorants.   · After going to the bathroom, wipe from front to back.  When should you call for help? Call your doctor now or seek immediate medical care if:    · Symptoms such as fever, chills, nausea, or vomiting get worse or appear for the first time.     · You have new pain in your back just below your rib cage. This is called flank pain.     · There is new blood or pus in your urine.     · You have any problems with your antibiotic medicine.    Watch closely for changes in your health, and be sure to contact your doctor if:    · You are not getting better after taking an antibiotic for 2 days.     · Your symptoms go away but then come back. Where can you learn more? Go to http://franki-vikram.info/. Enter Y448 in the search box to learn more about \"Urinary Tract Infection in Women: Care Instructions. \"  Current as of: March 21, 2018  Content Version: 11.8  © 7687-6689 [email or web address]. Care instructions adapted under license by [email or web address] (which disclaims liability or warranty for this information). If you have questions about a medical condition or this instruction, always ask your healthcare professional. [address] any warranty or liability for your use of this information. We hope that we have addressed all of your medical concerns. The examination and treatment you received in the Emergency Department were for an emergent problem and were not intended as complete care. It is important that you follow up with your healthcare provider(s) for ongoing care. If your symptoms worsen or do not improve as expected, and you are unable to reach your usual health care provider(s), you should return to the Emergency Department. [facility] participate in nationally recognized quality of care measures. If your blood pressure is greater than 120/80, as reported below, we urge that you seek medical care to address the potential of high blood pressure, commonly known as hypertension. Hypertension can be hereditary or can be caused by certain medical conditions, pain, stress, or \"white coat syndrome. \"       Please make an appointment with your health care provider(s) for follow up of your Emergency Department visit. VITALS:   Patient Vitals for the past 8 hrs:   Temp Pulse Resp BP SpO2   12/09/18 1002 97.9 °F (36.6 °C) 78 16 128/87 97 %          Thank you for allowing us to provide you with medical care today. We realize that you have many choices for your emergency care needs. Please choose us in the future for any continued health care needs. Chastity Gibbs NP            Recent Results (from the past 24 hour(s))   URINALYSIS W/ REFLEX CULTURE    Collection Time: 12/09/18 10:35 AM   Result Value Ref Range    Color ZURI      Appearance CLOUDY (A) CLEAR      Specific gravity 1.005 1.003 - 1.030      pH (UA) 5.0 5.0 - 8.0      Protein NEGATIVE  NEG mg/dL    Glucose NEGATIVE  NEG mg/dL    Ketone NEGATIVE  NEG mg/dL    Bilirubin NEGATIVE  NEG      Blood SMALL (A) NEG      Urobilinogen 0.2 0.2 - 1.0 EU/dL    Nitrites POSITIVE (A) NEG      Leukocyte Esterase MODERATE (A) NEG      UA:UC IF INDICATED URINE CULTURE ORDERED (A) CNI      WBC 20-50 0 - 4 /hpf    RBC 0-5 0 - 5 /hpf    Epithelial cells FEW FEW /lpf    Bacteria NEGATIVE  NEG /hpf    Hyaline cast 0-2 0 - 5 /lpf       No results found.

## 2018-12-11 LAB
BACTERIA SPEC CULT: NORMAL
CC UR VC: NORMAL
SERVICE CMNT-IMP: NORMAL

## 2019-01-28 ENCOUNTER — OFFICE VISIT (OUTPATIENT)
Dept: URGENT CARE | Age: 49
End: 2019-01-28

## 2019-01-28 VITALS
WEIGHT: 167 LBS | RESPIRATION RATE: 14 BRPM | SYSTOLIC BLOOD PRESSURE: 113 MMHG | DIASTOLIC BLOOD PRESSURE: 66 MMHG | OXYGEN SATURATION: 98 % | HEIGHT: 64 IN | BODY MASS INDEX: 28.51 KG/M2 | HEART RATE: 88 BPM | TEMPERATURE: 98 F

## 2019-01-28 DIAGNOSIS — J32.9 SINUSITIS, UNSPECIFIED CHRONICITY, UNSPECIFIED LOCATION: ICD-10-CM

## 2019-01-28 DIAGNOSIS — J40 BRONCHITIS: Primary | ICD-10-CM

## 2019-01-28 RX ORDER — AZITHROMYCIN 250 MG/1
TABLET, FILM COATED ORAL
Qty: 6 TAB | Refills: 0 | Status: SHIPPED | OUTPATIENT
Start: 2019-01-28 | End: 2019-02-24

## 2019-01-28 RX ORDER — PREDNISONE 5 MG/1
TABLET ORAL
Qty: 21 TAB | Refills: 0 | Status: SHIPPED | OUTPATIENT
Start: 2019-01-28 | End: 2019-08-31

## 2019-01-28 RX ORDER — ALBUTEROL SULFATE 90 UG/1
AEROSOL, METERED RESPIRATORY (INHALATION)
COMMUNITY
End: 2022-02-01 | Stop reason: SDUPTHER

## 2019-01-28 RX ORDER — BENZONATATE 200 MG/1
200 CAPSULE ORAL
Qty: 30 CAP | Refills: 0 | Status: SHIPPED | OUTPATIENT
Start: 2019-01-28 | End: 2019-08-31

## 2019-01-28 NOTE — PATIENT INSTRUCTIONS
Bronchitis: Care Instructions  Your Care Instructions    Bronchitis is inflammation of the bronchial tubes, which carry air to the lungs. The tubes swell and produce mucus, or phlegm. The mucus and inflamed bronchial tubes make you cough. You may have trouble breathing. Most cases of bronchitis are caused by viruses like those that cause colds. Antibiotics usually do not help and they may be harmful. Bronchitis usually develops rapidly and lasts about 2 to 3 weeks in otherwise healthy people. Follow-up care is a key part of your treatment and safety. Be sure to make and go to all appointments, and call your doctor if you are having problems. It's also a good idea to know your test results and keep a list of the medicines you take. How can you care for yourself at home? · Take all medicines exactly as prescribed. Call your doctor if you think you are having a problem with your medicine. · Get some extra rest.  · Take an over-the-counter pain medicine, such as acetaminophen (Tylenol), ibuprofen (Advil, Motrin), or naproxen (Aleve) to reduce fever and relieve body aches. Read and follow all instructions on the label. · Do not take two or more pain medicines at the same time unless the doctor told you to. Many pain medicines have acetaminophen, which is Tylenol. Too much acetaminophen (Tylenol) can be harmful. · Take an over-the-counter cough medicine that contains dextromethorphan to help quiet a dry, hacking cough so that you can sleep. Avoid cough medicines that have more than one active ingredient. Read and follow all instructions on the label. · Breathe moist air from a humidifier, hot shower, or sink filled with hot water. The heat and moisture will thin mucus so you can cough it out. · Do not smoke. Smoking can make bronchitis worse. If you need help quitting, talk to your doctor about stop-smoking programs and medicines. These can increase your chances of quitting for good.   When should you call for help? Call 911 anytime you think you may need emergency care. For example, call if:    · You have severe trouble breathing.    Call your doctor now or seek immediate medical care if:    · You have new or worse trouble breathing.     · You cough up dark brown or bloody mucus (sputum).     · You have a new or higher fever.     · You have a new rash.    Watch closely for changes in your health, and be sure to contact your doctor if:    · You cough more deeply or more often, especially if you notice more mucus or a change in the color of your mucus.     · You are not getting better as expected. Where can you learn more? Go to http://franki-vikram.info/. Enter H333 in the search box to learn more about \"Bronchitis: Care Instructions. \"  Current as of: September 5, 2018  Content Version: 11.9  © 4742-8233 [employer]. Care instructions adapted under license by [employer] (which disclaims liability or warranty for this information). If you have questions about a medical condition or this instruction, always ask your healthcare professional. [address] any warranty or liability for your use of this information. Sinusitis: Care Instructions  Your Care Instructions    Sinusitis is an infection of the lining of the sinus cavities in your head. Sinusitis often follows a cold. It causes pain and pressure in your head and face. In most cases, sinusitis gets better on its own in 1 to 2 weeks. But some mild symptoms may last for several weeks. Sometimes antibiotics are needed. Follow-up care is a key part of your treatment and safety. Be sure to make and go to all appointments, and call your doctor if you are having problems. It's also a good idea to know your test results and keep a list of the medicines you take. How can you care for yourself at home?   · Take an over-the-counter pain medicine, such as acetaminophen (Tylenol), ibuprofen (Advil, Motrin), or naproxen (Aleve). Read and follow all instructions on the label. · If the doctor prescribed antibiotics, take them as directed. Do not stop taking them just because you feel better. You need to take the full course of antibiotics. · Be careful when taking over-the-counter cold or flu medicines and Tylenol at the same time. Many of these medicines have acetaminophen, which is Tylenol. Read the labels to make sure that you are not taking more than the recommended dose. Too much acetaminophen (Tylenol) can be harmful. · Breathe warm, moist air from a steamy shower, a hot bath, or a sink filled with hot water. Avoid cold, dry air. Using a humidifier in your home may help. Follow the directions for cleaning the machine. · Use saline (saltwater) nasal washes to help keep your nasal passages open and wash out mucus and bacteria. You can buy saline nose drops at a grocery store or drugstore. Or you can make your own at home by adding 1 teaspoon of salt and 1 teaspoon of baking soda to 2 cups of distilled water. If you make your own, fill a bulb syringe with the solution, insert the tip into your nostril, and squeeze gently. Christopher Newman your nose. · Put a hot, wet towel or a warm gel pack on your face 3 or 4 times a day for 5 to 10 minutes each time. · Try a decongestant nasal spray like oxymetazoline (Afrin). Do not use it for more than 3 days in a row. Using it for more than 3 days can make your congestion worse. When should you call for help? Call your doctor now or seek immediate medical care if:    · You have new or worse swelling or redness in your face or around your eyes.     · You have a new or higher fever.    Watch closely for changes in your health, and be sure to contact your doctor if:    · You have new or worse facial pain.     · The mucus from your nose becomes thicker (like pus) or has new blood in it.     · You are not getting better as expected. Where can you learn more?   Go to http://franki-vikram.info/. Enter T333 in the search box to learn more about \"Sinusitis: Care Instructions. \"  Current as of: March 27, 2018  Content Version: 11.9  © 6249-6465 [email or web address], [employer]. Care instructions adapted under license by [employer] (which disclaims liability or warranty for this information). If you have questions about a medical condition or this instruction, always ask your healthcare professional. [place] [address] any warranty or liability for your use of this information.

## 2019-01-28 NOTE — PROGRESS NOTES
Cold Symptoms   The history is provided by the patient. This is a new problem. Episode onset: 1 week ago. The problem occurs constantly. The problem has been gradually worsening. The cough is productive of sputum. There has been no fever. Associated symptoms include rhinorrhea and wheezing. Pertinent negatives include no chest pain, no chills, no sweats, no ear congestion, no ear pain, no headaches, no sore throat, no myalgias, no shortness of breath, no nausea and no vomiting. She has tried decongestants for the symptoms. The treatment provided no relief. Smoker: Former. Past Medical History:   Diagnosis Date    Herniated cervical disc 2014    c4, c6    High cholesterol     Mononucleosis     Palpitations 2016        Past Surgical History:   Procedure Laterality Date    HX HEENT      nose         Family History   Problem Relation Age of Onset    Cancer Mother     Heart Disease Father     Breast Cancer Sister 52    Breast Cancer Maternal Aunt         Social History     Socioeconomic History    Marital status: SINGLE     Spouse name: Not on file    Number of children: Not on file    Years of education: Not on file    Highest education level: Not on file   Social Needs    Financial resource strain: Not on file    Food insecurity - worry: Not on file    Food insecurity - inability: Not on file   [employer] needs - medical: Not on file   [employer] needs - non-medical: Not on file   Occupational History    Not on file   Tobacco Use    Smoking status: Former Smoker     Last attempt to quit: 3/7/2015     Years since quitting: 3.8    Smokeless tobacco: Former User     Quit date: 5/30/2015   Substance and Sexual Activity    Alcohol use:  Yes     Alcohol/week: 8.4 oz     Types: 14 Cans of beer per week    Drug use: No    Sexual activity: Yes     Partners: Female     Birth control/protection: None   Other Topics Concern    Not on file   Social History Narrative    Not on file ALLERGIES: Sulfa (sulfonamide antibiotics)    Review of Systems   Constitutional: Negative for activity change, appetite change, chills and fever. HENT: Positive for congestion, rhinorrhea, sinus pressure and sinus pain. Negative for ear pain, sore throat and trouble swallowing. Respiratory: Positive for cough and wheezing. Negative for shortness of breath. Cardiovascular: Negative for chest pain and palpitations. Gastrointestinal: Negative for nausea and vomiting. Musculoskeletal: Negative for myalgias. Neurological: Negative for dizziness and headaches. Hematological: Negative for adenopathy. Vitals:    01/28/19 1552   BP: 113/66   Pulse: 88   Resp: 14   Temp: 98 °F (36.7 °C)   SpO2: 98%   Weight: 167 lb (75.8 kg)   Height: 5' 4\" (1.626 m)       Physical Exam   Constitutional: She appears well-developed and well-nourished. No distress. HENT:   Right Ear: Tympanic membrane, external ear and ear canal normal.   Left Ear: Tympanic membrane, external ear and ear canal normal.   Nose: Rhinorrhea present. Right sinus exhibits maxillary sinus tenderness and frontal sinus tenderness. Left sinus exhibits maxillary sinus tenderness and frontal sinus tenderness. Mouth/Throat: Oropharynx is clear and moist and mucous membranes are normal. No oropharyngeal exudate, posterior oropharyngeal edema, posterior oropharyngeal erythema or tonsillar abscesses. Cardiovascular: Normal rate, regular rhythm and normal heart sounds. Pulmonary/Chest: Effort normal. No respiratory distress. She has no decreased breath sounds. She has wheezes in the right lower field. She has no rhonchi. She has no rales. Lymphadenopathy:     She has cervical adenopathy. Neurological: She is alert. Skin: She is not diaphoretic. Psychiatric: She has a normal mood and affect. Her behavior is normal. Judgment and thought content normal.   Nursing note and vitals reviewed. MDM    ICD-10-CM ICD-9-CM    1. Bronchitis J40 490    2. Sinusitis, unspecified chronicity, unspecified location J32.9 473.9      Medications Ordered Today   Medications    azithromycin (ZITHROMAX) 250 mg tablet     Sig: Take two tablets today then one tablet daily     Dispense:  6 Tab     Refill:  0    benzonatate (TESSALON) 200 mg capsule     Sig: Take 1 Cap by mouth three (3) times daily as needed for Cough. Dispense:  30 Cap     Refill:  0    predniSONE (STERAPRED) 5 mg dose pack     Sig: See administration instruction per 5mg dose pack     Dispense:  21 Tab     Refill:  0     The patients condition was discussed with the patient and they understand. The patient is to follow up with PCP [facility]. If signs and symptoms become worse the pt is to go to the ER. The patient is to take medications as prescribed.              Procedures

## 2019-02-24 ENCOUNTER — OFFICE VISIT (OUTPATIENT)
Dept: URGENT CARE | Age: 49
End: 2019-02-24

## 2019-02-24 VITALS
WEIGHT: 176 LBS | BODY MASS INDEX: 28.28 KG/M2 | DIASTOLIC BLOOD PRESSURE: 61 MMHG | HEIGHT: 66 IN | RESPIRATION RATE: 16 BRPM | TEMPERATURE: 98.1 F | HEART RATE: 76 BPM | OXYGEN SATURATION: 96 % | SYSTOLIC BLOOD PRESSURE: 112 MMHG

## 2019-02-24 DIAGNOSIS — J01.90 ACUTE NON-RECURRENT SINUSITIS, UNSPECIFIED LOCATION: Primary | ICD-10-CM

## 2019-02-24 RX ORDER — AMOXICILLIN AND CLAVULANATE POTASSIUM 875; 125 MG/1; MG/1
1 TABLET, FILM COATED ORAL 2 TIMES DAILY
Qty: 20 TAB | Refills: 0 | Status: SHIPPED | OUTPATIENT
Start: 2019-02-24 | End: 2019-02-24 | Stop reason: ALTCHOICE

## 2019-02-24 RX ORDER — FLUTICASONE PROPIONATE 50 MCG
2 SPRAY, SUSPENSION (ML) NASAL DAILY
Qty: 1 BOTTLE | Refills: 0 | Status: SHIPPED | OUTPATIENT
Start: 2019-02-24 | End: 2019-08-31

## 2019-02-24 RX ORDER — FLUCONAZOLE 150 MG/1
150 TABLET ORAL DAILY
Qty: 1 TAB | Refills: 0 | Status: SHIPPED | OUTPATIENT
Start: 2019-02-24 | End: 2019-02-25

## 2019-02-24 RX ORDER — CEPHALEXIN 500 MG/1
500 CAPSULE ORAL 4 TIMES DAILY
Qty: 40 CAP | Refills: 0 | Status: SHIPPED | OUTPATIENT
Start: 2019-02-24 | End: 2019-03-06

## 2019-02-24 NOTE — PATIENT INSTRUCTIONS
Sinusitis: Care Instructions  Your Care Instructions    Sinusitis is an infection of the lining of the sinus cavities in your head. Sinusitis often follows a cold. It causes pain and pressure in your head and face. In most cases, sinusitis gets better on its own in 1 to 2 weeks. But some mild symptoms may last for several weeks. Sometimes antibiotics are needed. Follow-up care is a key part of your treatment and safety. Be sure to make and go to all appointments, and call your doctor if you are having problems. It's also a good idea to know your test results and keep a list of the medicines you take. How can you care for yourself at home? · Take an over-the-counter pain medicine, such as acetaminophen (Tylenol), ibuprofen (Advil, Motrin), or naproxen (Aleve). Read and follow all instructions on the label. · If the doctor prescribed antibiotics, take them as directed. Do not stop taking them just because you feel better. You need to take the full course of antibiotics. · Be careful when taking over-the-counter cold or flu medicines and Tylenol at the same time. Many of these medicines have acetaminophen, which is Tylenol. Read the labels to make sure that you are not taking more than the recommended dose. Too much acetaminophen (Tylenol) can be harmful. · Breathe warm, moist air from a steamy shower, a hot bath, or a sink filled with hot water. Avoid cold, dry air. Using a humidifier in your home may help. Follow the directions for cleaning the machine. · Use saline (saltwater) nasal washes to help keep your nasal passages open and wash out mucus and bacteria. You can buy saline nose drops at a grocery store or drugstore. Or you can make your own at home by adding 1 teaspoon of salt and 1 teaspoon of baking soda to 2 cups of distilled water. If you make your own, fill a bulb syringe with the solution, insert the tip into your nostril, and squeeze gently. Thermon Kings your nose.   · Put a hot, wet towel or a warm gel pack on your face 3 or 4 times a day for 5 to 10 minutes each time. · Try a decongestant nasal spray like oxymetazoline (Afrin). Do not use it for more than 3 days in a row. Using it for more than 3 days can make your congestion worse. When should you call for help? Call your doctor now or seek immediate medical care if:    · You have new or worse swelling or redness in your face or around your eyes.     · You have a new or higher fever.    Watch closely for changes in your health, and be sure to contact your doctor if:    · You have new or worse facial pain.     · The mucus from your nose becomes thicker (like pus) or has new blood in it.     · You are not getting better as expected. Where can you learn more? Go to http://franki-vikram.info/. Enter C704 in the search box to learn more about \"Sinusitis: Care Instructions. \"  Current as of: March 27, 2018  Content Version: 11.9  © 9806-1181 [employer]. Care instructions adapted under license by [employer] (which disclaims liability or warranty for this information). If you have questions about a medical condition or this instruction, always ask your healthcare professional. [place] [address] any warranty or liability for your use of this information. Saline Nasal Washes: Care Instructions  Your Care Instructions  Saline nasal washes help keep the nasal passages open by washing out thick or dried mucus. This simple remedy can help relieve symptoms of allergies, sinusitis, and colds. It also can make the nose feel more comfortable by keeping the mucous membranes moist. You may notice a little burning sensation in your nose the first few times you use the solution, but this usually gets better in a few days. Follow-up care is a key part of your treatment and safety. Be sure to make and go to all appointments, and call your doctor if you are having problems.  It's also a good idea to know your test results and keep a list of the medicines you take. How can you care for yourself at home? · You can buy premixed saline solution in a squeeze bottle or other sinus rinse products at a drugstore. Read and follow the instructions on the label. · You also can make your own saline solution by adding 1 teaspoon of salt and 1 teaspoon of baking soda to 2 cups of distilled water. · If you use a homemade solution, pour a small amount into a clean bowl. Using a rubber bulb syringe, squeeze the syringe and place the tip in the salt water. Pull a small amount of the salt water into the syringe by relaxing your hand. · Sit down with your head tilted slightly back. Do not lie down. Put the tip of the bulb syringe or the squeeze bottle a little way into one of your nostrils. Gently drip or squirt a few drops into the nostril. Repeat with the other nostril. Some sneezing and gagging are normal at first.  · Gently blow your nose. · Wipe the syringe or bottle tip clean after each use. · Repeat this 2 or 3 times a day. · Use nasal washes gently if you have nosebleeds often. When should you call for help? Watch closely for changes in your health, and be sure to contact your doctor if:    · You often get nosebleeds.     · You have problems doing the nasal washes. Where can you learn more? Go to http://franki-vikram.info/. Enter [id number] in the search box to learn more about \"Saline Nasal Washes: Care Instructions. \"  Current as of: March 27, 2018  Content Version: 11.9  © 4351-5371 [employer]. Care instructions adapted under license by [employer] (which disclaims liability or warranty for this information). If you have questions about a medical condition or this instruction, always ask your healthcare professional. [address] any warranty or liability for your use of this information.

## 2019-02-24 NOTE — PROGRESS NOTES
Sinus Pain   This is a recurrent problem. The current episode started more than 1 week ago. The problem occurs constantly. The problem has been gradually worsening. Associated symptoms include headaches. Associated symptoms comments: Facial pain . Nothing aggravates the symptoms. Nothing relieves the symptoms. She has tried nothing for the symptoms. Past Medical History:   Diagnosis Date    Herniated cervical disc 2014    c4, c6    High cholesterol     Mononucleosis     Palpitations 2016        Past Surgical History:   Procedure Laterality Date    HX HEENT      nose         Family History   Problem Relation Age of Onset    Cancer Mother     Heart Disease Father     Breast Cancer Sister 52    Breast Cancer Maternal Aunt         Social History     Socioeconomic History    Marital status: SINGLE     Spouse name: Not on file    Number of children: Not on file    Years of education: Not on file    Highest education level: Not on file   Social Needs    Financial resource strain: Not on file    Food insecurity - worry: Not on file    Food insecurity - inability: Not on file   [place] needs - medical: Not on file   [place] needs - non-medical: Not on file   Occupational History    Not on file   Tobacco Use    Smoking status: Former Smoker     Last attempt to quit: 3/7/2015     Years since quitting: 3.9    Smokeless tobacco: Former User     Quit date: 5/30/2015   Substance and Sexual Activity    Alcohol use: Yes     Alcohol/week: 8.4 oz     Types: 14 Cans of beer per week    Drug use: No    Sexual activity: Yes     Partners: Female     Birth control/protection: None   Other Topics Concern    Not on file   Social History Narrative    Not on file                ALLERGIES: Sulfa (sulfonamide antibiotics)    Review of Systems   HENT: Positive for congestion, sinus pressure and sinus pain. Neurological: Positive for headaches.    All other systems reviewed and are negative. Vitals:    19 1525   BP: 112/61   Pulse: 76   Resp: 16   Temp: 98.1 °F (36.7 °C)   SpO2: 96%   Weight: 176 lb (79.8 kg)   Height: 5' 6\" (1.676 m)       Physical Exam   Constitutional: No distress. HENT:   Right Ear: Tympanic membrane and ear canal normal.   Left Ear: Tympanic membrane and ear canal normal.   Nose: Right sinus exhibits maxillary sinus tenderness. Left sinus exhibits maxillary sinus tenderness. Mouth/Throat: No oropharyngeal exudate, posterior oropharyngeal edema or posterior oropharyngeal erythema. Eyes: Conjunctivae are normal. Right eye exhibits no discharge. Left eye exhibits no discharge. Neck: Neck supple. Pulmonary/Chest: Effort normal and breath sounds normal. No respiratory distress. She has no wheezes. She has no rales. Lymphadenopathy:     She has no cervical adenopathy. Skin: No rash noted. Nursing note and vitals reviewed. MDM    Procedures        ICD-10-CM ICD-9-CM    1. Acute non-recurrent sinusitis, unspecified location J01.90 461.9      Medications Ordered Today   Medications    DISCONTD: amoxicillin-clavulanate (AUGMENTIN) 875-125 mg per tablet     Sig: Take 1 Tab by mouth two (2) times a day. Dispense:  20 Tab     Refill:  0    fluticasone (FLONASE) 50 mcg/actuation nasal spray     Si Sprays by Both Nostrils route daily. Dispense:  1 Bottle     Refill:  0    fluconazole (DIFLUCAN) 150 mg tablet     Sig: Take 1 Tab by mouth daily for 1 day. Dispense:  1 Tab     Refill:  0    cephALEXin (KEFLEX) 500 mg capsule     Sig: Take 1 Cap by mouth four (4) times daily for 10 days. Dispense:  40 Cap     Refill:  0     No results found for any visits on 19. The patients condition was discussed with the patient and they understand. The patient is to follow up with primary care doctor. If signs and symptoms become worse the pt is to go to the ER. The patient is to take medications as prescribed.

## 2019-08-31 ENCOUNTER — OFFICE VISIT (OUTPATIENT)
Dept: URGENT CARE | Age: 49
End: 2019-08-31

## 2019-08-31 VITALS
HEART RATE: 75 BPM | SYSTOLIC BLOOD PRESSURE: 144 MMHG | BODY MASS INDEX: 27.8 KG/M2 | TEMPERATURE: 99.1 F | WEIGHT: 173 LBS | OXYGEN SATURATION: 98 % | DIASTOLIC BLOOD PRESSURE: 81 MMHG | HEIGHT: 66 IN | RESPIRATION RATE: 18 BRPM

## 2019-08-31 DIAGNOSIS — J01.90 SUBACUTE SINUSITIS, UNSPECIFIED LOCATION: Primary | ICD-10-CM

## 2019-08-31 RX ORDER — DOXYCYCLINE 100 MG/1
100 CAPSULE ORAL 2 TIMES DAILY
Qty: 14 CAP | Refills: 0 | Status: SHIPPED | OUTPATIENT
Start: 2019-08-31 | End: 2019-09-07

## 2019-08-31 NOTE — PROGRESS NOTES
Works as , having lightheaded feeling, congestion, cough, left facial pain, and PND. Tried advil sinus and cold with pseudophed with some relief, but still feels it is getting in her chest. 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 Cold Symptoms   The history is provided by the patient. This is a new problem. The current episode started more than 2 days ago. The problem occurs constantly. The problem has not changed since onset. The cough is non-productive. There has been no fever. Associated symptoms include headaches and wheezing. Pertinent negatives include no chest pain, no chills, no ear congestion, no ear pain, no sore throat, no shortness of breath and no nausea. She has tried decongestants for the symptoms. The treatment provided mild relief. Smoker: former, but lives with smoker. Past medical history comments: \"weak immune system\".         Past Medical History:   Diagnosis Date    Herniated cervical disc 2014    c4, c6    High cholesterol     Mononucleosis     Palpitations         Past Surgical History:   Procedure Laterality Date    HX HEENT      nose         Family History   Problem Relation Age of Onset    Cancer Mother     Heart Disease Father     Breast Cancer Sister 52    Breast Cancer Maternal Aunt         Social History     Socioeconomic History    Marital status: SINGLE     Spouse name: Not on file    Number of children: Not on file    Years of education: Not on file    Highest education level: Not on file   Occupational History    Not on file   Social Needs    Financial resource strain: Not on file    Food insecurity:     Worry: Not on file     Inability: Not on file    Transportation needs:     Medical: Not on file     Non-medical: Not on file   Tobacco Use    Smoking status: Former Smoker     Last attempt to quit: 3/7/2015     Years since quittin.4    Smokeless tobacco: Former User     Quit date: 2015   Substance and Sexual Activity    Alcohol use: Yes     Alcohol/week: 14.0 standard drinks     Types: 14 Cans of beer per week    Drug use: No    Sexual activity: Yes     Partners: Female     Birth control/protection: None   Lifestyle    Physical activity:     Days per week: Not on file     Minutes per session: Not on file    Stress: Not on file   Relationships    Social connections:     Talks on phone: Not on file     Gets together: Not on file     Attends Druze service: Not on file     Active member of club or organization: Not on file     Attends meetings of clubs or organizations: Not on file     Relationship status: Not on file    Intimate partner violence:     Fear of current or ex partner: Not on file     Emotionally abused: Not on file     Physically abused: Not on file     Forced sexual activity: Not on file   Other Topics Concern    Not on file   Social History Narrative    Not on file                ALLERGIES: Sulfa (sulfonamide antibiotics)    Review of Systems   Constitutional: Positive for fatigue. Negative for activity change, chills and fever. HENT: Positive for congestion and sinus pressure (left facial pain). Negative for ear pain and sore throat. Respiratory: Positive for cough and wheezing. Negative for shortness of breath. Cardiovascular: Negative for chest pain. Gastrointestinal: Negative for abdominal pain and nausea. Neurological: Positive for headaches. All other systems reviewed and are negative. Vitals:    08/31/19 1908   BP: 144/81   Pulse: 75   Resp: 18   Temp: 99.1 °F (37.3 °C)   SpO2: 98%   Weight: 173 lb (78.5 kg)   Height: 5' 6\" (1.676 m)       Physical Exam   Constitutional: She is oriented to person, place, and time. She appears well-developed and well-nourished. No distress. HENT:   Head: Normocephalic and atraumatic. Right Ear: External ear normal. Tympanic membrane is injected. Tympanic membrane is not erythematous.    Left Ear: External ear normal. Tympanic membrane is injected. Tympanic membrane is not erythematous. Nose: No mucosal edema or rhinorrhea. Right sinus exhibits no maxillary sinus tenderness and no frontal sinus tenderness. Left sinus exhibits no maxillary sinus tenderness and no frontal sinus tenderness. Mouth/Throat: Mucous membranes are normal. Posterior oropharyngeal erythema present. No oropharyngeal exudate or posterior oropharyngeal edema. +PND     Eyes: Pupils are equal, round, and reactive to light. Cardiovascular: Normal rate and intact distal pulses. Pulmonary/Chest: Effort normal. No respiratory distress. Abdominal: Soft. She exhibits no distension. Musculoskeletal: She exhibits no edema. Neurological: She is alert and oriented to person, place, and time. Skin: Skin is warm and dry. She is not diaphoretic. Psychiatric: She has a normal mood and affect. Her behavior is normal. Judgment normal.   Nursing note and vitals reviewed. MDM    Procedures    CLINICAL IMPRESSION: use of OTC antihistamines and NSAID advised with sinus rinse. Asked to HOLD Rx x 2 days and fill INI. ICD-10-CM ICD-9-CM    1. Subacute sinusitis, unspecified location J01.90 461.9 doxycycline (MONODOX) 100 mg capsule         Plan:  F/U with PCP/Specialty in 3 days  Orders Placed This Encounter    doxycycline (MONODOX) 100 mg capsule     Sig: Take 1 Cap by mouth two (2) times a day for 7 days. HOLD antibiotic for 2-3 days, unless you develop a temperature over 100.4 or have severe facial pain. Dispense:  14 Cap     Refill:  0           The patients condition was discussed with the patient and they understand. The patient is to follow up with PCP. If signs and symptoms become worse the pt is to go to the ER. The patient is to take medications as prescribed.

## 2019-08-31 NOTE — PATIENT INSTRUCTIONS
Follow Up with PCP in 2-3 days for routine care. Call to be seen sooner or return Here/ER, if no improvement with treatments advised/prescribed or symptoms/pain worsen (develop fever, pain worsens significantly, or develop NEW symptoms). May use  Benadryl, Phenylephrine, or Chlor-Trimetron for continued cough and sore throat. Claritin, allegra, Xyzal, or Zyrtec may also help. Also try Breathe-Rite (or similar) nose strips for nasal congestion and difficulty sleeping. Try 2 teaspoons of 100% pure honey at bedtime to help with nighttime coughing/sore throat. Vitamin C (7724-0093 mg) may also be helpful for your symptoms. *Frequent handwashing*, rest, and plenty of fluids are most important. Saline Nasal Washes: Care Instructions  Your Care Instructions  Saline nasal washes help keep the nasal passages open by washing out thick or dried mucus. This simple remedy can help relieve symptoms of allergies, sinusitis, and colds. It also can make the nose feel more comfortable by keeping the mucous membranes moist. You may notice a little burning sensation in your nose the first few times you use the solution, but this usually gets better in a few days. Follow-up care is a key part of your treatment and safety. Be sure to make and go to all appointments, and call your doctor if you are having problems. It's also a good idea to know your test results and keep a list of the medicines you take. How can you care for yourself at home? · You can buy premixed saline solution in a squeeze bottle or other sinus rinse products at a drugstore. Read and follow the instructions on the label. · You also can make your own saline solution by adding 1 teaspoon of salt and 1 teaspoon of baking soda to 2 cups of distilled water. · If you use a homemade solution, pour a small amount into a clean bowl. Using a rubber bulb syringe, squeeze the syringe and place the tip in the salt water.  Pull a small amount of the salt water into the syringe by relaxing your hand. · Sit down with your head tilted slightly back. Do not lie down. Put the tip of the bulb syringe or the squeeze bottle a little way into one of your nostrils. Gently drip or squirt a few drops into the nostril. Repeat with the other nostril. Some sneezing and gagging are normal at first.  · Gently blow your nose. · Wipe the syringe or bottle tip clean after each use. · Repeat this 2 or 3 times a day. · Use nasal washes gently if you have nosebleeds often. When should you call for help? Watch closely for changes in your health, and be sure to contact your doctor if:    · You often get nosebleeds.     · You have problems doing the nasal washes. Where can you learn more? Go to http://franki-vikram.info/. Enter [id number] [id number] in the search box to learn more about \"Saline Nasal Washes: Care Instructions. \"  Current as of: October 21, 2018  Content Version: 12.1  © 3304-7904 [employer]. Care instructions adapted under license by [employer] (which disclaims liability or warranty for this information). If you have questions about a medical condition or this instruction, always ask your healthcare professional. [place] [address] any warranty or liability for your use of this information.

## 2019-09-12 ENCOUNTER — APPOINTMENT (OUTPATIENT)
Dept: GENERAL RADIOLOGY | Age: 49
End: 2019-09-12
Attending: PHYSICIAN ASSISTANT
Payer: SELF-PAY

## 2019-09-12 ENCOUNTER — HOSPITAL ENCOUNTER (EMERGENCY)
Age: 49
Discharge: HOME | End: 2019-09-12
Attending: EMERGENCY MEDICINE
Payer: SELF-PAY

## 2019-09-12 ENCOUNTER — APPOINTMENT (OUTPATIENT)
Dept: CT IMAGING | Age: 49
End: 2019-09-12
Attending: PHYSICIAN ASSISTANT
Payer: SELF-PAY

## 2019-09-12 VITALS
OXYGEN SATURATION: 98 % | TEMPERATURE: 98.5 F | DIASTOLIC BLOOD PRESSURE: 95 MMHG | HEART RATE: 64 BPM | RESPIRATION RATE: 16 BRPM | SYSTOLIC BLOOD PRESSURE: 128 MMHG

## 2019-09-12 DIAGNOSIS — R51.9 NONINTRACTABLE HEADACHE, UNSPECIFIED CHRONICITY PATTERN, UNSPECIFIED HEADACHE TYPE: Primary | ICD-10-CM

## 2019-09-12 DIAGNOSIS — J34.89 SINUS PRESSURE: ICD-10-CM

## 2019-09-12 LAB
ALBUMIN SERPL-MCNC: 3.8 G/DL (ref 3.5–5)
ALBUMIN/GLOB SERPL: 1.1 {RATIO} (ref 1.1–2.2)
ALP SERPL-CCNC: 65 U/L (ref 45–117)
ALT SERPL-CCNC: 25 U/L (ref 12–78)
ANION GAP SERPL CALC-SCNC: 8 MMOL/L (ref 5–15)
AST SERPL-CCNC: 18 U/L (ref 15–37)
BASOPHILS # BLD: 0.1 K/UL (ref 0–0.1)
BASOPHILS NFR BLD: 1 % (ref 0–1)
BILIRUB SERPL-MCNC: 0.3 MG/DL (ref 0.2–1)
BUN SERPL-MCNC: 11 MG/DL (ref 6–20)
BUN/CREAT SERPL: 11 (ref 12–20)
CALCIUM SERPL-MCNC: 8.5 MG/DL (ref 8.5–10.1)
CHLORIDE SERPL-SCNC: 101 MMOL/L (ref 97–108)
CO2 SERPL-SCNC: 27 MMOL/L (ref 21–32)
COMMENT, HOLDF: NORMAL
CREAT SERPL-MCNC: 0.96 MG/DL (ref 0.55–1.02)
DIFFERENTIAL METHOD BLD: NORMAL
EOSINOPHIL # BLD: 0.2 K/UL (ref 0–0.4)
EOSINOPHIL NFR BLD: 2 % (ref 0–7)
ERYTHROCYTE [DISTWIDTH] IN BLOOD BY AUTOMATED COUNT: 11.9 % (ref 11.5–14.5)
GLOBULIN SER CALC-MCNC: 3.4 G/DL (ref 2–4)
GLUCOSE SERPL-MCNC: 79 MG/DL (ref 65–100)
HCT VFR BLD AUTO: 42.7 % (ref 35–47)
HGB BLD-MCNC: 13.6 G/DL (ref 11.5–16)
IMM GRANULOCYTES # BLD AUTO: 0 K/UL (ref 0–0.04)
IMM GRANULOCYTES NFR BLD AUTO: 0 % (ref 0–0.5)
LYMPHOCYTES # BLD: 2.5 K/UL (ref 0.8–3.5)
LYMPHOCYTES NFR BLD: 35 % (ref 12–49)
MCH RBC QN AUTO: 30.4 PG (ref 26–34)
MCHC RBC AUTO-ENTMCNC: 31.9 G/DL (ref 30–36.5)
MCV RBC AUTO: 95.5 FL (ref 80–99)
MONOCYTES # BLD: 0.6 K/UL (ref 0–1)
MONOCYTES NFR BLD: 9 % (ref 5–13)
NEUTS SEG # BLD: 3.8 K/UL (ref 1.8–8)
NEUTS SEG NFR BLD: 53 % (ref 32–75)
NRBC # BLD: 0 K/UL (ref 0–0.01)
NRBC BLD-RTO: 0 PER 100 WBC
PLATELET # BLD AUTO: 310 K/UL (ref 150–400)
PMV BLD AUTO: 11.1 FL (ref 8.9–12.9)
POTASSIUM SERPL-SCNC: 3.8 MMOL/L (ref 3.5–5.1)
PROT SERPL-MCNC: 7.2 G/DL (ref 6.4–8.2)
RBC # BLD AUTO: 4.47 M/UL (ref 3.8–5.2)
SAMPLES BEING HELD,HOLD: NORMAL
SODIUM SERPL-SCNC: 136 MMOL/L (ref 136–145)
TROPONIN I SERPL-MCNC: <0.05 NG/ML
WBC # BLD AUTO: 7.2 K/UL (ref 3.6–11)

## 2019-09-12 PROCEDURE — 80053 COMPREHEN METABOLIC PANEL: CPT

## 2019-09-12 PROCEDURE — 93005 ELECTROCARDIOGRAM TRACING: CPT

## 2019-09-12 PROCEDURE — 74011250637 HC RX REV CODE- 250/637: Performed by: PHYSICIAN ASSISTANT

## 2019-09-12 PROCEDURE — 36415 COLL VENOUS BLD VENIPUNCTURE: CPT

## 2019-09-12 PROCEDURE — 74011250636 HC RX REV CODE- 250/636: Performed by: PHYSICIAN ASSISTANT

## 2019-09-12 PROCEDURE — 70450 CT HEAD/BRAIN W/O DYE: CPT

## 2019-09-12 PROCEDURE — 96374 THER/PROPH/DIAG INJ IV PUSH: CPT

## 2019-09-12 PROCEDURE — 84484 ASSAY OF TROPONIN QUANT: CPT

## 2019-09-12 PROCEDURE — 85025 COMPLETE CBC W/AUTO DIFF WBC: CPT

## 2019-09-12 PROCEDURE — 71046 X-RAY EXAM CHEST 2 VIEWS: CPT

## 2019-09-12 PROCEDURE — 99284 EMERGENCY DEPT VISIT MOD MDM: CPT

## 2019-09-12 RX ORDER — KETOROLAC TROMETHAMINE 30 MG/ML
15 INJECTION, SOLUTION INTRAMUSCULAR; INTRAVENOUS
Status: COMPLETED | OUTPATIENT
Start: 2019-09-12 | End: 2019-09-12

## 2019-09-12 RX ORDER — PREDNISONE 50 MG/1
50 TABLET ORAL DAILY
Qty: 5 TAB | Refills: 0 | Status: SHIPPED | OUTPATIENT
Start: 2019-09-12 | End: 2019-09-17

## 2019-09-12 RX ORDER — GUAIFENESIN 100 MG/5ML
200 SOLUTION ORAL
Status: COMPLETED | OUTPATIENT
Start: 2019-09-12 | End: 2019-09-12

## 2019-09-12 RX ORDER — OXYMETAZOLINE HCL 0.05 %
2 SPRAY, NON-AEROSOL (ML) NASAL 2 TIMES DAILY
Qty: 1 EACH | Refills: 0 | Status: SHIPPED | OUTPATIENT
Start: 2019-09-12 | End: 2019-09-15

## 2019-09-12 RX ADMIN — GUAIFENESIN 200 MG: 200 SOLUTION ORAL at 21:24

## 2019-09-12 RX ADMIN — SODIUM CHLORIDE 1000 ML: 900 INJECTION, SOLUTION INTRAVENOUS at 21:23

## 2019-09-12 RX ADMIN — KETOROLAC TROMETHAMINE 15 MG: 30 INJECTION, SOLUTION INTRAMUSCULAR at 21:23

## 2019-09-13 LAB
ATRIAL RATE: 68 BPM
CALCULATED P AXIS, ECG09: 66 DEGREES
CALCULATED R AXIS, ECG10: -19 DEGREES
CALCULATED T AXIS, ECG11: 28 DEGREES
DIAGNOSIS, 93000: NORMAL
P-R INTERVAL, ECG05: 152 MS
Q-T INTERVAL, ECG07: 382 MS
QRS DURATION, ECG06: 80 MS
QTC CALCULATION (BEZET), ECG08: 406 MS
VENTRICULAR RATE, ECG03: 68 BPM

## 2019-09-13 NOTE — ED NOTES
Discharge instructions given to pt by PA. Pt educated on prescribed medications in teach back method and verbalizes understanding. Opportunity for questions provided. Pt ambulatory out of unit, in no acute distress and taken home by self.

## 2019-09-13 NOTE — ED PROVIDER NOTES
56 y/o female with PMHx of hyperlipidemia, palpitations, cervical herniated disc and mononucleosis, presenting with complaint of headache. The patient reports sinus congestion and headache for the past 2 or 3 weeks, with associated \"ear congestion\" and neck pain. She was seen in urgent care a week and a half ago and was prescribed doxycycline. She finished the prescription this week without any improvement in her symptoms. Her headache is rated 5/10, frontal in location, nonradiating. She has tried Benadryl, Zyrtec and ibuprofen with some relief. She reports an episode of palpitations and nausea 2 nights ago, no further palpitations or nausea since that time. She denies fevers, cough, shortness of breath, vomiting, generalized body aches, weakness or numbness. The history is provided by the patient. Past Medical History:   Diagnosis Date    Herniated cervical disc 2014    c4, c6    High cholesterol     Mononucleosis     Palpitations 2016       Past Surgical History:   Procedure Laterality Date    HX HEENT      nose         Family History:   Problem Relation Age of Onset    Cancer Mother     Heart Disease Father     Breast Cancer Sister 52    Breast Cancer Maternal Aunt        Social History     Socioeconomic History    Marital status: SINGLE     Spouse name: Not on file    Number of children: Not on file    Years of education: Not on file    Highest education level: Not on file   Occupational History    Not on file   Social Needs    Financial resource strain: Not on file    Food insecurity:     Worry: Not on file     Inability: Not on file    Transportation needs:     Medical: Not on file     Non-medical: Not on file   Tobacco Use    Smoking status: Former Smoker     Last attempt to quit: 3/7/2015     Years since quittin.5    Smokeless tobacco: Former User     Quit date: 2015   Substance and Sexual Activity    Alcohol use:  Yes     Alcohol/week: 14.0 standard drinks Types: 14 Cans of beer per week    Drug use: No    Sexual activity: Yes     Partners: Female     Birth control/protection: None   Lifestyle    Physical activity:     Days per week: Not on file     Minutes per session: Not on file    Stress: Not on file   Relationships    Social connections:     Talks on phone: Not on file     Gets together: Not on file     Attends Yazidi service: Not on file     Active member of club or organization: Not on file     Attends meetings of clubs or organizations: Not on file     Relationship status: Not on file    Intimate partner violence:     Fear of current or ex partner: Not on file     Emotionally abused: Not on file     Physically abused: Not on file     Forced sexual activity: Not on file   Other Topics Concern    Not on file   Social History Narrative    Not on file         ALLERGIES: Sulfa (sulfonamide antibiotics)    Review of Systems   Constitutional: Negative for chills and fever. HENT: Positive for congestion and sinus pressure. Respiratory: Negative for cough and shortness of breath. Cardiovascular: Positive for palpitations. Gastrointestinal: Positive for nausea (2 nights ago). Negative for vomiting. Musculoskeletal: Negative for arthralgias and myalgias. Neurological: Positive for headaches. Negative for weakness and numbness. All other systems reviewed and are negative. Vitals:    09/12/19 2008 09/12/19 2038   BP:  (!) 141/94   Pulse: 96 74   Resp:  16   SpO2: 98% 98%            Physical Exam   Constitutional: She is oriented to person, place, and time. She appears well-developed and well-nourished. No distress. HENT:   Head: Normocephalic and atraumatic. Right Ear: Hearing, tympanic membrane, external ear and ear canal normal.   Left Ear: Hearing, tympanic membrane, external ear and ear canal normal.   Mouth/Throat: Uvula is midline and mucous membranes are normal. No trismus in the jaw. Posterior oropharyngeal erythema present.  No oropharyngeal exudate, posterior oropharyngeal edema or tonsillar abscesses. Eyes: Conjunctivae and EOM are normal.   Neck: Normal range of motion. Neck supple. Cardiovascular: Normal rate, regular rhythm and normal heart sounds. Pulmonary/Chest: Effort normal and breath sounds normal.   Abdominal: Soft. She exhibits no distension. There is no tenderness. There is no guarding. Lymphadenopathy:     She has no cervical adenopathy. Neurological: She is alert and oriented to person, place, and time. Skin: Skin is warm and dry. She is not diaphoretic. Nursing note and vitals reviewed. MDM  Number of Diagnoses or Management Options  Nonintractable headache, unspecified chronicity pattern, unspecified headache type:   Sinus pressure:      Amount and/or Complexity of Data Reviewed  Clinical lab tests: ordered and reviewed  Tests in the radiology section of CPT®: ordered and reviewed  Independent visualization of images, tracings, or specimens: yes (CXR)    Patient Progress  Patient progress: stable         Procedures  ED EKG interpretation:  Rhythm: normal sinus rhythm; and regular . Rate (approx.): 68; Axis: normal; ST/T wave: normal.        58 y/o female with PMHx of hyperlipidemia, palpitations, cervical herniated disc and mononucleosis, presenting with complaint of headache. History and exam suggestive of viral illness vs tension headache. CT head reveals no evidence of acute intracranial abnormalities. CXR, read by radiology and independently visualized and interpreted by myself, reveals no evidence of pneumonia or other cardiopulmonary abnormalities. EKG without acute ischemic changes, troponin negative. CBC and CMP are unremarkable. Plan is for discharge home with Rx for Afrin nasal spray and prednisone x5 days. Recommended PCP follow up as needed. Strict ED return precautions discussed and provided in writing at time of discharge.  The patient verbalized understanding and agreement with this plan.

## 2019-09-13 NOTE — ED TRIAGE NOTES
Patient arrives ambulatory  from home with CC of headache, blurred vision, congestion x2 weeks. Pt was recently diagnosed with an URI and given a round of antibiotics, pt reports minimal relief.

## 2019-09-13 NOTE — DISCHARGE INSTRUCTIONS

## 2020-07-02 ENCOUNTER — HOSPITAL ENCOUNTER (EMERGENCY)
Age: 50
Discharge: HOME | End: 2020-07-02
Attending: EMERGENCY MEDICINE
Payer: SUBSIDIZED

## 2020-07-02 VITALS
BODY MASS INDEX: 27.67 KG/M2 | RESPIRATION RATE: 16 BRPM | HEART RATE: 65 BPM | WEIGHT: 172.18 LBS | SYSTOLIC BLOOD PRESSURE: 127 MMHG | DIASTOLIC BLOOD PRESSURE: 81 MMHG | TEMPERATURE: 98.4 F | OXYGEN SATURATION: 97 % | HEIGHT: 66 IN

## 2020-07-02 DIAGNOSIS — N30.00 ACUTE CYSTITIS WITHOUT HEMATURIA: Primary | ICD-10-CM

## 2020-07-02 DIAGNOSIS — R30.0 DYSURIA: ICD-10-CM

## 2020-07-02 LAB
APPEARANCE UR: CLEAR
BACTERIA URNS QL MICRO: NEGATIVE /HPF
BILIRUB UR QL: NEGATIVE
COLOR UR: ABNORMAL
EPITH CASTS URNS QL MICRO: ABNORMAL /LPF
GLUCOSE UR STRIP.AUTO-MCNC: NEGATIVE MG/DL
HGB UR QL STRIP: NEGATIVE
HYALINE CASTS URNS QL MICRO: ABNORMAL /LPF (ref 0–5)
KETONES UR QL STRIP.AUTO: NEGATIVE MG/DL
LEUKOCYTE ESTERASE UR QL STRIP.AUTO: ABNORMAL
NITRITE UR QL STRIP.AUTO: POSITIVE
PH UR STRIP: 5.5 [PH] (ref 5–8)
PROT UR STRIP-MCNC: NEGATIVE MG/DL
RBC #/AREA URNS HPF: ABNORMAL /HPF (ref 0–5)
SP GR UR REFRACTOMETRY: 1.01 (ref 1–1.03)
UA: UC IF INDICATED,UAUC: ABNORMAL
UROBILINOGEN UR QL STRIP.AUTO: 0.2 EU/DL (ref 0.2–1)
WBC URNS QL MICRO: ABNORMAL /HPF (ref 0–4)

## 2020-07-02 PROCEDURE — 99283 EMERGENCY DEPT VISIT LOW MDM: CPT

## 2020-07-02 PROCEDURE — 81001 URINALYSIS AUTO W/SCOPE: CPT

## 2020-07-02 RX ORDER — PHENAZOPYRIDINE HYDROCHLORIDE 200 MG/1
200 TABLET, FILM COATED ORAL 3 TIMES DAILY
Qty: 9 TAB | Refills: 0 | Status: SHIPPED | OUTPATIENT
Start: 2020-07-02 | End: 2020-07-05

## 2020-07-02 RX ORDER — CIPROFLOXACIN 500 MG/1
500 TABLET ORAL 2 TIMES DAILY
Qty: 5 TAB | Refills: 0 | Status: ON HOLD | OUTPATIENT
Start: 2020-07-02 | End: 2021-08-18

## 2020-07-02 NOTE — ED PROVIDER NOTES
EMERGENCY DEPARTMENT HISTORY AND PHYSICAL EXAM      Date: 7/2/2020  Patient Name: Melvina Chauhan    History of Presenting Illness     Chief Complaint   Patient presents with    Urinary Pain     Urinary pain, urgency for 1-2 weeks, unrelieved with meds at home/OTC. Reports hx of UTI       History Provided By: Patient    HPI: Melvina Chauhan, 54 y.o. female with history of recurring urinary tract infections presents ambulatory to the ED with cc of a week or 2 of 2 out of 10 constant, aching bladder discomfort that is associated with dysuria, urgency and frequency. She tells me her symptoms feel exactly like her previous urinary tract infections. There has been no nausea, vomiting or diarrhea. There has been no fever. She has developed a mild, right-sided back pain over the last 24 hours and she is uncertain if it is related to her urinary symptoms. Has been taking Azo-Standard with some temporary, but no lasting improvement. She tells me she would be most grateful if I could prescribe ciprofloxacin for her urinary tract infection and that she is allergic to sulfa. There are no other complaints, changes, or physical findings at this time. PCP: Pavithra Miller MD    Current Outpatient Medications   Medication Sig Dispense Refill    ciprofloxacin HCl (CIPRO) 500 mg tablet Take 1 Tab by mouth two (2) times a day. 5 Tab 0    phenazopyridine (Pyridium) 200 mg tablet Take 1 Tab by mouth three (3) times daily for 3 days. 9 Tab 0    albuterol (PROVENTIL HFA, VENTOLIN HFA, PROAIR HFA) 90 mcg/actuation inhaler Take  by inhalation.        Past History     Past Medical History:  Past Medical History:   Diagnosis Date    Herniated cervical disc 2014    c4, c6    High cholesterol     Mononucleosis     Palpitations 2016       Past Surgical History:  Past Surgical History:   Procedure Laterality Date    HX HEENT      nose       Family History:  Family History   Problem Relation Age of Onset    Cancer Mother    [facility] Heart Disease Father     Breast Cancer Sister 52    Breast Cancer Maternal Aunt        Social History:  Social History     Tobacco Use    Smoking status: Former Smoker     Last attempt to quit: 3/7/2015     Years since quittin.3    Smokeless tobacco: Former User     Quit date: 2015   Substance Use Topics    Alcohol use: Yes     Alcohol/week: 14.0 standard drinks     Types: 14 Cans of beer per week    Drug use: No       Allergies: Allergies   Allergen Reactions    Sulfa (Sulfonamide Antibiotics) Swelling     Review of Systems   Review of Systems   Constitutional: Negative for fatigue and fever. HENT: Negative for ear pain and sore throat. Eyes: Negative for pain, redness and visual disturbance. Respiratory: Negative for cough and shortness of breath. Cardiovascular: Negative for chest pain and palpitations. Gastrointestinal: Negative for abdominal pain, nausea and vomiting. Genitourinary: Positive for dysuria, frequency and urgency. Musculoskeletal: Positive for back pain. Negative for gait problem, neck pain and neck stiffness. Skin: Negative for rash and wound. Neurological: Negative for dizziness, weakness, light-headedness, numbness and headaches. Physical Exam   Physical Exam  Vitals signs and nursing note reviewed. Constitutional:       General: She is not in acute distress. Appearance: She is well-developed. She is not toxic-appearing. HENT:      Head: Normocephalic and atraumatic. Jaw: No trismus. Right Ear: External ear normal.      Left Ear: External ear normal.      Nose: Nose normal.      Mouth/Throat:      Pharynx: Uvula midline. Eyes:      General: No scleral icterus. Conjunctiva/sclera: Conjunctivae normal.      Pupils: Pupils are equal, round, and reactive to light. Neck:      Musculoskeletal: Full passive range of motion without pain and normal range of motion. Cardiovascular:      Rate and Rhythm: Normal rate and regular rhythm. Pulmonary:      Effort: Pulmonary effort is normal. No tachypnea, accessory muscle usage or respiratory distress. Breath sounds: No decreased breath sounds or wheezing. Abdominal:      Palpations: Abdomen is soft. Tenderness: There is abdominal tenderness in the suprapubic area. There is right CVA tenderness. Comments: Soft  Very mild suprapubic discomfort with firm palpation  Very mild right-sided CVA discomfort with firm palpation   Musculoskeletal: Normal range of motion. Skin:     Findings: No rash. Neurological:      Mental Status: She is alert and oriented to person, place, and time. She is not disoriented. GCS: GCS eye subscore is 4. GCS verbal subscore is 5. GCS motor subscore is 6. Cranial Nerves: No cranial nerve deficit. Psychiatric:         Speech: Speech normal.       Diagnostic Study Results     Labs -     Recent Results (from the past 12 hour(s))   URINALYSIS W/ REFLEX CULTURE    Collection Time: 07/02/20 11:59 AM   Result Value Ref Range    Color DARK YELLOW      Appearance CLEAR CLEAR      Specific gravity 1.014 1.003 - 1.030      pH (UA) 5.5 5.0 - 8.0      Protein Negative NEG mg/dL    Glucose Negative NEG mg/dL    Ketone Negative NEG mg/dL    Bilirubin Negative NEG      Blood Negative NEG      Urobilinogen 0.2 0.2 - 1.0 EU/dL    Nitrites Positive (A) NEG      Leukocyte Esterase TRACE (A) NEG      WBC 5-10 0 - 4 /hpf    RBC 0-5 0 - 5 /hpf    Epithelial cells FEW FEW /lpf    Bacteria Negative NEG /hpf    UA:UC IF INDICATED CULTURE NOT INDICATED BY UA RESULT CNI      Hyaline cast 0-2 0 - 5 /lpf       Radiologic Studies -   No orders to display     CT Results  (Last 48 hours)    None        CXR Results  (Last 48 hours)    None        Medical Decision Making   I am the first provider for this patient. I reviewed the vital signs, available nursing notes, past medical history, past surgical history, family history and social history.     Vital Signs-Reviewed the patient's vital signs. Patient Vitals for the past 12 hrs:   Temp Pulse Resp BP SpO2   07/02/20 1141 98.4 °F (36.9 °C) 65 16 127/81 97 %       Pulse Oximetry Analysis - 97% on RA    Records Reviewed: Nursing Notes, Old Medical Records, Previous Radiology Studies and Previous Laboratory Studies    Provider Notes (Medical Decision Making): Afebrile; well-appearing; UA is nitrite positive without significant pyuria or bacteriuria, which I attribute to the phenazopyridine; given the patient's symptoms and past history of the same, believe reasonable to treat with antibiotics; refer to primary care; return precautions    ED Course:   Initial assessment performed. The patients presenting problems have been discussed, and they are in agreement with the care plan formulated and outlined with them. I have encouraged them to ask questions as they arise throughout their visit. Disposition:  Discharge    PLAN:  1. Current Discharge Medication List      START taking these medications    Details   ciprofloxacin HCl (CIPRO) 500 mg tablet Take 1 Tab by mouth two (2) times a day. Qty: 5 Tab, Refills: 0      phenazopyridine (Pyridium) 200 mg tablet Take 1 Tab by mouth three (3) times daily for 3 days. Qty: 9 Tab, Refills: 0           2. Follow-up Information     Follow up With Specialties Details Why Contact [facility]  Schedule an appointment as soon as possible for a visit PRIMARY CARE: call to schedule follow up [address]   [phone]        Return to ED if worse     Diagnosis     Clinical Impression:   1. Acute cystitis without hematuria    2.  Dysuria

## 2021-01-06 ENCOUNTER — HOSPITAL ENCOUNTER (OUTPATIENT)
Dept: LAB | Age: 51
Discharge: HOME | End: 2021-01-06

## 2021-01-06 LAB
ALBUMIN SERPL-MCNC: 4.3 G/DL (ref 3.5–5)
ALBUMIN/GLOB SERPL: 1.3 {RATIO} (ref 1.1–2.2)
ALP SERPL-CCNC: 66 U/L (ref 45–117)
ALT SERPL-CCNC: 28 U/L (ref 12–78)
ANION GAP SERPL CALC-SCNC: 7 MMOL/L (ref 5–15)
AST SERPL-CCNC: 19 U/L (ref 15–37)
BILIRUB SERPL-MCNC: 0.2 MG/DL (ref 0.2–1)
BUN SERPL-MCNC: 17 MG/DL (ref 6–20)
BUN/CREAT SERPL: 19 (ref 12–20)
CALCIUM SERPL-MCNC: 8.8 MG/DL (ref 8.5–10.1)
CHLORIDE SERPL-SCNC: 108 MMOL/L (ref 97–108)
CHOLEST SERPL-MCNC: 308 MG/DL
CO2 SERPL-SCNC: 25 MMOL/L (ref 21–32)
CREAT SERPL-MCNC: 0.9 MG/DL (ref 0.55–1.02)
ERYTHROCYTE [DISTWIDTH] IN BLOOD BY AUTOMATED COUNT: 12.7 % (ref 11.5–14.5)
GLOBULIN SER CALC-MCNC: 3.3 G/DL (ref 2–4)
GLUCOSE SERPL-MCNC: 88 MG/DL (ref 65–100)
HCT VFR BLD AUTO: 43.9 % (ref 35–47)
HDLC SERPL-MCNC: 67 MG/DL
HDLC SERPL: 4.6 {RATIO} (ref 0–5)
HGB BLD-MCNC: 13.8 G/DL (ref 11.5–16)
LDLC SERPL CALC-MCNC: 225.6 MG/DL (ref 0–100)
LIPID PROFILE,FLP: ABNORMAL
MCH RBC QN AUTO: 30.3 PG (ref 26–34)
MCHC RBC AUTO-ENTMCNC: 31.4 G/DL (ref 30–36.5)
MCV RBC AUTO: 96.3 FL (ref 80–99)
NRBC # BLD: 0 K/UL (ref 0–0.01)
NRBC BLD-RTO: 0 PER 100 WBC
PLATELET # BLD AUTO: 307 K/UL (ref 150–400)
PMV BLD AUTO: 12 FL (ref 8.9–12.9)
POTASSIUM SERPL-SCNC: 3.9 MMOL/L (ref 3.5–5.1)
PROT SERPL-MCNC: 7.6 G/DL (ref 6.4–8.2)
RBC # BLD AUTO: 4.56 M/UL (ref 3.8–5.2)
SODIUM SERPL-SCNC: 140 MMOL/L (ref 136–145)
TRIGL SERPL-MCNC: 77 MG/DL (ref ?–150)
VLDLC SERPL CALC-MCNC: 15.4 MG/DL
WBC # BLD AUTO: 7.7 K/UL (ref 3.6–11)

## 2021-01-06 PROCEDURE — 80061 LIPID PANEL: CPT

## 2021-01-06 PROCEDURE — 85027 COMPLETE CBC AUTOMATED: CPT

## 2021-01-06 PROCEDURE — 80053 COMPREHEN METABOLIC PANEL: CPT

## 2021-02-03 ENCOUNTER — TRANSCRIBE ORDER (OUTPATIENT)
Dept: SCHEDULING | Age: 51
End: 2021-02-03

## 2021-02-03 DIAGNOSIS — M54.12 RADICULOPATHY, CERVICAL REGION: Primary | ICD-10-CM

## 2021-02-05 ENCOUNTER — TRANSCRIBE ORDER (OUTPATIENT)
Dept: SCHEDULING | Age: 51
End: 2021-02-05

## 2021-02-05 DIAGNOSIS — Z12.31 VISIT FOR SCREENING MAMMOGRAM: Primary | ICD-10-CM

## 2021-02-10 ENCOUNTER — HOSPITAL ENCOUNTER (OUTPATIENT)
Dept: MRI IMAGING | Age: 51
Discharge: HOME | End: 2021-02-10
Attending: PHYSICIAN ASSISTANT
Payer: MEDICAID

## 2021-02-10 DIAGNOSIS — M54.12 RADICULOPATHY, CERVICAL REGION: ICD-10-CM

## 2021-02-10 PROCEDURE — 72141 MRI NECK SPINE W/O DYE: CPT

## 2021-03-11 ENCOUNTER — OFFICE VISIT (OUTPATIENT)
Dept: NEUROLOGY | Age: 51
End: 2021-03-11
Payer: MEDICAID

## 2021-03-11 VITALS
BODY MASS INDEX: 29.57 KG/M2 | HEIGHT: 66 IN | TEMPERATURE: 97.9 F | RESPIRATION RATE: 18 BRPM | DIASTOLIC BLOOD PRESSURE: 76 MMHG | OXYGEN SATURATION: 98 % | HEART RATE: 60 BPM | SYSTOLIC BLOOD PRESSURE: 118 MMHG | WEIGHT: 184 LBS

## 2021-03-11 DIAGNOSIS — G97.84: ICD-10-CM

## 2021-03-11 DIAGNOSIS — F41.1 GAD (GENERALIZED ANXIETY DISORDER): ICD-10-CM

## 2021-03-11 DIAGNOSIS — R20.2 PARESTHESIA: ICD-10-CM

## 2021-03-11 DIAGNOSIS — G56.03 BILATERAL CARPAL TUNNEL SYNDROME: ICD-10-CM

## 2021-03-11 DIAGNOSIS — G62.9 NEUROPATHY: ICD-10-CM

## 2021-03-11 DIAGNOSIS — R51.9 HEADACHE DISORDER: Primary | ICD-10-CM

## 2021-03-11 PROCEDURE — 99205 OFFICE O/P NEW HI 60 MIN: CPT | Performed by: PSYCHIATRY & NEUROLOGY

## 2021-03-11 RX ORDER — CYCLOBENZAPRINE HCL 5 MG
5 TABLET ORAL
COMMUNITY
End: 2021-03-11 | Stop reason: SDUPTHER

## 2021-03-11 RX ORDER — BUTALBITAL, ACETAMINOPHEN AND CAFFEINE 50; 325; 40 MG/1; MG/1; MG/1
TABLET ORAL
Qty: 30 TAB | Refills: 1 | Status: ON HOLD | OUTPATIENT
Start: 2021-03-11 | End: 2021-08-18

## 2021-03-11 RX ORDER — LORAZEPAM 2 MG/1
TABLET ORAL
Qty: 2 TAB | Refills: 0 | Status: ON HOLD | OUTPATIENT
Start: 2021-03-11 | End: 2021-04-29

## 2021-03-11 RX ORDER — PREDNISONE 10 MG/1
10 TABLET ORAL 2 TIMES DAILY
Qty: 20 TAB | Refills: 0 | Status: ON HOLD | OUTPATIENT
Start: 2021-03-11 | End: 2021-04-29

## 2021-03-11 RX ORDER — IBUPROFEN 200 MG/1
200 TABLET, FILM COATED ORAL
COMMUNITY
End: 2022-04-25

## 2021-03-11 RX ORDER — CETIRIZINE HCL 10 MG
10 TABLET ORAL
COMMUNITY
End: 2022-10-11

## 2021-03-11 RX ORDER — CYCLOBENZAPRINE HCL 10 MG
10 TABLET ORAL 2 TIMES DAILY
Qty: 60 TAB | Refills: 1 | Status: SHIPPED | OUTPATIENT
Start: 2021-03-11 | End: 2021-08-19
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
## 2021-03-19 ENCOUNTER — HOSPITAL ENCOUNTER (OUTPATIENT)
Dept: MRI IMAGING | Age: 51
Discharge: HOME | End: 2021-03-19
Attending: PSYCHIATRY & NEUROLOGY
Payer: MEDICAID

## 2021-03-19 VITALS — BODY MASS INDEX: 28.25 KG/M2 | WEIGHT: 175 LBS

## 2021-03-19 DIAGNOSIS — R20.2 PARESTHESIA: ICD-10-CM

## 2021-03-19 DIAGNOSIS — G97.84: ICD-10-CM

## 2021-03-19 DIAGNOSIS — R51.9 HEADACHE DISORDER: ICD-10-CM

## 2021-03-19 PROCEDURE — 74011250636 HC RX REV CODE- 250/636: Performed by: PSYCHIATRY & NEUROLOGY

## 2021-03-19 PROCEDURE — A9575 INJ GADOTERATE MEGLUMI 0.1ML: HCPCS | Performed by: PSYCHIATRY & NEUROLOGY

## 2021-03-19 PROCEDURE — 70553 MRI BRAIN STEM W/O & W/DYE: CPT

## 2021-03-19 RX ORDER — GADOTERATE MEGLUMINE 376.9 MG/ML
15 INJECTION INTRAVENOUS
Status: COMPLETED | OUTPATIENT
Start: 2021-03-19 | End: 2021-03-19

## 2021-03-19 RX ADMIN — GADOTERATE MEGLUMINE 15 ML: 376.9 INJECTION INTRAVENOUS at 15:13

## 2021-03-30 ENCOUNTER — TELEPHONE (OUTPATIENT)
Dept: NEUROLOGY | Age: 51
End: 2021-03-30

## 2021-03-31 ENCOUNTER — TELEPHONE (OUTPATIENT)
Dept: NEUROLOGY | Age: 51
End: 2021-03-31

## 2021-03-31 ENCOUNTER — TELEPHONE (OUTPATIENT)
Dept: FAMILY PLANNING/WOMEN'S HEALTH CLINIC | Age: 51
End: 2021-03-31

## 2021-03-31 NOTE — TELEPHONE ENCOUNTER
----- Message from [facility] sent at 3/24/2021 12:52 PM EDT -----  Regarding: /Telephone     Caller's first and last name:Pt  Reason for call:Pt wants to know the results of her MRI and wants to know if she can schedule a VV.   Callback required yes/no and why:Yes  Best contact number(s):[phone]  Details to clarify the request:n/a

## 2021-03-31 NOTE — TELEPHONE ENCOUNTER
Talked to patient about situation at [facility] clinic on 4/14. She would like to come if she can b/c she has been waiting on the vaccine b/c of the mammogram.  But she is having other health issues so she may call and cancel.

## 2021-04-01 ENCOUNTER — TRANSCRIBE ORDER (OUTPATIENT)
Dept: SCHEDULING | Age: 51
End: 2021-04-01

## 2021-04-01 ENCOUNTER — OFFICE VISIT (OUTPATIENT)
Dept: NEUROLOGY | Age: 51
End: 2021-04-01
Payer: MEDICAID

## 2021-04-01 ENCOUNTER — TELEPHONE (OUTPATIENT)
Dept: NEUROLOGY | Age: 51
End: 2021-04-01

## 2021-04-01 VITALS
TEMPERATURE: 97.3 F | HEART RATE: 65 BPM | DIASTOLIC BLOOD PRESSURE: 97 MMHG | BODY MASS INDEX: 30.7 KG/M2 | SYSTOLIC BLOOD PRESSURE: 130 MMHG | OXYGEN SATURATION: 97 % | HEIGHT: 66 IN | WEIGHT: 191 LBS

## 2021-04-01 DIAGNOSIS — R51.9 HEADACHE DISORDER: ICD-10-CM

## 2021-04-01 DIAGNOSIS — R42 DIZZINESS: ICD-10-CM

## 2021-04-01 DIAGNOSIS — G97.1 SPINAL PUNCTURE HEADACHE: Primary | ICD-10-CM

## 2021-04-01 DIAGNOSIS — G97.84: ICD-10-CM

## 2021-04-01 PROCEDURE — 99214 OFFICE O/P EST MOD 30 MIN: CPT | Performed by: PSYCHIATRY & NEUROLOGY

## 2021-04-01 RX ORDER — ACETAZOLAMIDE 250 MG/1
250 TABLET ORAL 2 TIMES DAILY
Qty: 60 TAB | Refills: 1 | Status: SHIPPED | OUTPATIENT
Start: 2021-04-01 | End: 2021-05-01

## 2021-04-01 NOTE — TELEPHONE ENCOUNTER
----- Message from Cliff Townsend sent at 3/25/2021 11:03 AM EDT -----  Regarding: Dr. Mindi Morfin Message/Vendor Calls    Caller's first and last name:      Reason for call:       Callback required yes/no and why: Yes       Best contact number(s): [phone]       Details to clarify the request: Pt is requesting status of MRI and is is also needing a sooner follow up with Dr. Hector Harding

## 2021-04-01 NOTE — PROGRESS NOTES
Neurology Progress Note    NAME:  Isatu Duron   :   1970   MRN:   [id number]     Date/Time:  2021  Subjective:      Isatu Duron is a 52 y.o. female here today for follow-up for headache, dizziness, test results. Patient was accompanied by a friend to the visit. Patient says she still having symptoms of headaches mostly when sitting up or standing, gets better when she lies down. Patient says symptoms improved only a little since the last visit, she was unable to tolerate Fioricet. She says when the headache she has not been able to do anything, carrying out activities of daily living has been a problem, she is being helped by her friend. She says she cannot drive because of the headache dizziness nausea when she sits now for some time. She still experiencing blurry vision, however, her memory seems to be improving. She denies loss of consciousness, dysphagia or odynophagia. MRI of the brain reviewed with patient was unremarkable however did not rule out intracranial hypotension. We are most likely dealing with spinal headache. I will give patient Diamox 250 mg p.o. twice daily. Because the spinal headache has continued to persist for nearly 2 months, I will refer patient for blood patch. On a scale of 1-10, patient rates her pain level to be between 8 and 9  . Review of Systems - General ROS: positive for  - fatigue, malaise and sleep disturbance  Psychological ROS: positive for - anxiety and sleep disturbances  Ophthalmic ROS: positive for - decreased vision, double vision and photophobia  ENT ROS: positive for - headaches, tinnitus, vertigo and visual changes  Allergy and Immunology ROS: negative  Hematological and Lymphatic ROS: negative  Endocrine ROS: negative  Respiratory ROS: no cough, shortness of breath, or wheezing  Cardiovascular ROS: no chest pain or dyspnea on exertion  Gastrointestinal ROS: no abdominal pain, change in bowel habits, or black or bloody stools  Genito-Urinary ROS: no dysuria, trouble voiding, or hematuria  Musculoskeletal ROS: positive for - joint pain, muscle pain and muscular weakness  Neurological ROS: positive for - dizziness, headaches, impaired coordination/balance, memory loss, numbness/tingling, visual changes and weakness  Dermatological ROS: negative  Medications reviewed:  Current Outpatient Medications   Medication Sig Dispense Refill    acetaZOLAMIDE (DIAMOX) 250 mg tablet Take 1 Tab by mouth two (2) times a day for 30 days. 60 Tab 1    ibuprofen (Motrin IB) 200 mg tablet Take 600 mg by mouth.  cetirizine (ZYRTEC) 10 mg tablet Take 10 mg by mouth.  butalbital-acetaminophen-caffeine (FIORICET, ESGIC) -40 mg per tablet Take 1 tab bid for 7 days, then 1 tab prn for severe headache 30 Tab 1    predniSONE (DELTASONE) 10 mg tablet Take 10 mg by mouth two (2) times a day. 20 Tab 0    cyclobenzaprine (FLEXERIL) 10 mg tablet Take 1 Tab by mouth two (2) times a day. 60 Tab 1    LORazepam (ATIVAN) 2 mg tablet Take 1 tablet 30 minutes to 1 hour before MRI procedure 2 Tab 0    ciprofloxacin HCl (CIPRO) 500 mg tablet Take 1 Tab by mouth two (2) times a day. 5 Tab 0    albuterol (PROVENTIL HFA, VENTOLIN HFA, PROAIR HFA) 90 mcg/actuation inhaler Take  by inhalation.           Objective:   Vitals:  Vitals:    04/01/21 1126   BP: (!) 130/97   Pulse: 65   Temp: 97.3 °F (36.3 °C)   SpO2: 97%   Weight: 191 lb (86.6 kg)   Height: 5' 6\" (1.676 m)   PainSc:   8   PainLoc: Head       Lab Data Reviewed:  Lab Results   Component Value Date/Time    WBC 7.7 01/06/2021 10:30 AM    HCT 43.9 01/06/2021 10:30 AM    HGB 13.8 01/06/2021 10:30 AM    PLATELET 171 65/30/9130 10:30 AM       Lab Results   Component Value Date/Time    Sodium 140 01/06/2021 10:30 AM    Potassium 3.9 01/06/2021 10:30 AM    Chloride 108 01/06/2021 10:30 AM    CO2 25 01/06/2021 10:30 AM    Glucose 88 01/06/2021 10:30 AM    BUN 17 01/06/2021 10:30 AM    Creatinine 0.90 01/06/2021 10:30 AM    Calcium 8.8 01/06/2021 10:30 AM       No components found for: TROPQUANT    No results found for: SHOLA      No results found for: HBA1C, HGBE8, OXW1EHYD, [id number]     No results found for: B12LT, FOL, RBCF    No results found for: SHOLA, Chancy Conception, XBANA    Lab Results   Component Value Date/Time    Cholesterol, total 308 (H) 01/06/2021 10:30 AM    HDL Cholesterol 67 01/06/2021 10:30 AM    LDL, calculated 225.6 (H) 01/06/2021 10:30 AM    VLDL, calculated 15.4 01/06/2021 10:30 AM    Triglyceride 77 01/06/2021 10:30 AM    CHOL/HDL Ratio 4.6 01/06/2021 10:30 AM         CT Results (recent):  Results from Hospital Encounter encounter on 09/12/19   CT HEAD WO CONT    Narrative EXAM:  CT head without contrast    INDICATION: Headache. COMPARISON: CT 8/30/2016    TECHNIQUE: Noncontrast head CT. Coronal and sagittal reformats. CT dose  reduction was achieved through use of a standardized protocol tailored for this  examination and automatic exposure control for dose modulation. Adaptive  statistical iterative reconstruction (ASIR) was utilized. FINDINGS: The ventricles and sulci are age-appropriate without hydrocephalus. There is no mass effect or midline shift. There is no intracranial hemorrhage or  extra-axial fluid collection. There is no abnormal parenchymal attenuation. The  gray-white matter differentiation is maintained. The basal cisterns are patent. The osseous structures are intact. The visualized paranasal sinuses and mastoid  air cells are clear. Impression IMPRESSION:     No acute intracranial abnormality. MRI Results (recent):  Results from [place] encounter on 03/19/21   MRI BRAIN W WO CONT    Narrative EXAM:  MRI BRAIN W WO CONT    INDICATION:    Headache. COMPARISON:  CT head 9/12/2019. CONTRAST: 15 ml Dotarem. TECHNIQUE:    Multiplanar multisequence acquisition without and with contrast of the brain. FINDINGS:  The ventricles are normal in size and position.  The brain parenchyma has normal  signal characteristics. There is no acute infarct, hemorrhage, extra-axial fluid  collection, or mass effect. There is no cerebellar tonsillar herniation. Expected arterial flow-voids are present. No evidence of abnormal enhancement. Mild mucosal thickening in the left maxillary sinus and left ethmoidal air cells  without air-fluid level. The mastoid air cells and middle ears are clear. The  orbital contents are within normal limits. No significant osseous or scalp  lesions are identified. Impression 1. Normal brain MRI. IR Results (recent):  No results found for this or any previous visit. VAS/US Results (recent):  Results from Hospital Encounter encounter on 04/28/15   DUPLEX CAROTID BILATERAL       PHYSICAL EXAM:  General:    Alert, cooperative, no distress, appears stated age. Head:   Normocephalic, without obvious abnormality, atraumatic. Eyes:   Conjunctivae/corneas clear. PERRLA  Nose:  Nares normal. No drainage or sinus tenderness. Throat:    Lips, mucosa, and tongue normal.  No Thrush  Neck:  Supple, symmetrical,  no adenopathy, thyroid: non tender    no carotid bruit and no JVD. Paraspinal tenderness  Back:    Symmetric,  No CVA tenderness. Lungs:   Clear to auscultation bilaterally. No Wheezing or Rhonchi. No rales. Chest wall:  No tenderness or deformity. No Accessory muscle use. Heart:   Regular rate and rhythm,  no murmur, rub or gallop. Abdomen:   Soft, non-tender. Not distended. Bowel sounds normal. No masses  Extremities: Extremities normal, atraumatic, No cyanosis. No edema. No clubbing  Skin:     Texture, turgor normal. No rashes or lesions. Not Jaundiced  Lymph nodes: Cervical, supraclavicular normal.  Psych:  Good insight. Not depressed. Anxious. NEUROLOGICAL EXAM:  Appearance: The patient is well developed, well nourished, provides a coherent history and is in no acute distress.    Mental Status: Oriented to time, place and person. Mood and affect appropriate. Cranial Nerves:   Intact visual fields. Fundi are benign. ANDREWS, EOM's full, no nystagmus, no ptosis. Facial sensation is normal. Corneal reflexes are intact. Facial movement is symmetric. Hearing is normal bilaterally. Palate is midline with normal sternocleidomastoid and trapezius muscles are normal. Tongue is midline. Motor:  5/5 strength in upper and lower proximal and distal muscles. Normal bulk and tone. No fasciculations. Reflexes:   Deep tendon reflexes 2+/4 and symmetrical.   Sensory:   Normal to touch, pinprick and vibration. Gait:  Normal gait. Tremor:   No tremor noted. Cerebellar:  No cerebellar signs present. Neurovascular:  Normal heart sounds and regular rhythm, peripheral pulses intact, and no carotid bruits. Assesment  1. Spinal puncture headache    - TX INJ,LUMB EPIDUR,BLOOD/CLOT PATCH    2. Intracranial hypotension following other procedure    - TX INJ,LUMB EPIDUR,BLOOD/CLOT PATCH    3. Headache disorder    - TX INJ,LUMB EPIDUR,BLOOD/CLOT PATCH    4. Dizziness    - TX INJ,LUMB EPIDUR,BLOOD/CLOT PATCH    ___________________________________________________  PLAN: Medication and plan discussed with patient      ICD-10-CM ICD-9-CM    1. Spinal puncture headache  G97.1 349.0 TX INJ,LUMB EPIDUR,BLOOD/CLOT PATCH   2. Intracranial hypotension following other procedure  G97.84 458.8 TX INJ,LUMB EPIDUR,BLOOD/CLOT PATCH     998.89    3. Headache disorder  R51.9 784.0 TX INJ,LUMB EPIDUR,BLOOD/CLOT PATCH   4. Dizziness  R42 780.4 TX INJ,LUMB EPIDUR,BLOOD/CLOT PATCH     Follow-up and Dispositions    · Return in about 2 weeks (around 4/15/2021).                ___________________________________________________    Attending Physician: Nai Taylor MD

## 2021-04-02 ENCOUNTER — TRANSCRIBE ORDER (OUTPATIENT)
Dept: SCHEDULING | Age: 51
End: 2021-04-02

## 2021-04-02 DIAGNOSIS — G97.1 SPINAL HEADACHE: Primary | ICD-10-CM

## 2021-04-05 NOTE — TELEPHONE ENCOUNTER
Patient called and left voicemail requesting to reschedule her [facility] appointment until late May, early June. Nurse called patient back ( 2 identifiers confirmed). Patient tells me that she is having surgery this week and will have a recovery period of 4-6 weeks, looking in to have her appt. Rescheduled as stated above. Appt. On 4/14 canceled and rescheduled for 6/2/21 at [facility] at 12:20pm. Patient agreed with date/time and location of new appt. Patient wrote appt. Information and repeated back to nurse. This has been fully explained to the patient, who indicates understanding and agrees with plan. No further questions at this time.  Hilaria Grissom RN

## 2021-04-06 ENCOUNTER — TELEPHONE (OUTPATIENT)
Dept: NEUROLOGY | Age: 51
End: 2021-04-06

## 2021-04-06 NOTE — TELEPHONE ENCOUNTER
Received call from patient and she wants to know if her \"surgery order has been updated\"  She said that she is supposed to have a blood patch and it was ordered incorrectly.

## 2021-04-06 NOTE — TELEPHONE ENCOUNTER
I called and talked to Gary Jang [place] and notified that I would have him call her as soon as possible.   To discuss blood patch

## 2021-04-06 NOTE — TELEPHONE ENCOUNTER
We received a message this morning from Melia SALDIVAR from Dr. Moraima Grimm office about a mutual patient Delene Call [phone]  They are asking for a call back [phone] kh

## 2021-04-07 NOTE — TELEPHONE ENCOUNTER
I called the patient and she will call Dr. Bishop Beard and will get the ok to get the blood patch of Lumbar or if he wants this to be cervical.    She will send me a message if this needs to be cervical as this will need to be scheduled by Dr. Tera Benítez office.

## 2021-04-12 ENCOUNTER — VIRTUAL VISIT (OUTPATIENT)
Dept: NEUROLOGY | Age: 51
End: 2021-04-12
Payer: MEDICAID

## 2021-04-12 DIAGNOSIS — G97.84: ICD-10-CM

## 2021-04-12 DIAGNOSIS — G97.1 SPINAL PUNCTURE HEADACHE: Primary | ICD-10-CM

## 2021-04-12 DIAGNOSIS — M54.2 CERVICALGIA: ICD-10-CM

## 2021-04-12 PROCEDURE — 99214 OFFICE O/P EST MOD 30 MIN: CPT | Performed by: PSYCHIATRY & NEUROLOGY

## 2021-04-12 RX ORDER — IBUPROFEN AND PSEUDOEPHEDRINE HYDROCHLORIDE 200; 30 MG/1; MG/1
TABLET, COATED ORAL
COMMUNITY
End: 2022-04-25

## 2021-04-12 NOTE — PATIENT INSTRUCTIONS
Office Policies 
 
o Phone calls/patient messages: Please allow up to 24 hours for someone in the office to contact you about your call or message. Be mindful your provider may be out of the office or your message may require further review. We encourage you to use [employer] for your messages as this is a faster, more efficient way to communicate with our office 
 
o Medication Refills: 
Prescription medications require up to 48 business hours to process. We encourage you to use [employer] for your refills. For controlled medications: Please allow up to 72 business hours to process. Certain medications may require you to  a written prescription at our office. NO narcotic/controlled medications will be prescribed after 4pm Monday through Friday or on weekends 
 
o Form/Paperwork Completion: We ask that you allow 7-14 business days. You may also download your forms to [employer] to have your doctor print off. Due to COVID-19, please note there may be a longer wait time than usual. Thank you

## 2021-04-12 NOTE — PROGRESS NOTES
Neurology Progress Note  Deidra Mccarty was seen by synchronous (real-time) audio-video technology on 21. Consent:  She and/or her healthcare decision maker is aware that this patient-initiated Telehealth encounter is a billable service, with coverage as determined by her insurance carrier. She is aware that she may receive a bill and has provided verbal consent to proceed: Yes    I was in the office while conducting this encounter. Pursuant to the emergency declaration under the [facility] [place] [address] waiver authority and the [employer] Act, this Virtual  Visit was conducted, with patient's consent, to reduce the patient's risk of exposure to COVID-19 and provide continuity of care for an established patient. Services were provided through a video synchronous discussion virtually to substitute for in-person clinic visit. NAME:  Deidra Mccarty   :   1970   MRN:   [id number]     Date/Time:  2021  Subjective:    Deidra Mccarty is a 52 y.o. female here today for follow-up for headache, dizziness, subsequent to [facility] at the cervical region. Patient says she still having symptoms of headaches mostly when sitting up or standing, gets better when she lies down. As the headache is still persistent, blood patch was planned for patient, however, the question arose whether the patch will be done at cervical or lumbar region. After discussing with spine and interventional radiologist, it was agreed that the best place to start with his lumbar as it will not make much difference if lumbar or cervical region but the safest place would be lumbar region to be tried first, if that fails to correct the problem then consideration may be given to the cervical region or the place where the [facility] was performed, is mostly because of complication.   This was relayed to the patient at time of visit, she was in agreement with her spine physician. She also prefers the procedure to be done at either [facility] or [facility].  I will contact patient when everything is settled. She noted that the symptoms did not improve much. She says due to the headache, she has not been able to do anything, carrying out activities of daily living has been a problem, she is being helped by her friend. She says she cannot drive because of the headache dizziness nausea when she sits now for some time. She still experiencing blurry vision, however, her memory seems to be improving. She denies loss of consciousness, dysphagia or odynophagia. .Review of Systems - General ROS: positive for  - fatigue, malaise and sleep disturbance  Psychological ROS: positive for - anxiety and sleep disturbances  Ophthalmic ROS: positive for - decreased vision, double vision and photophobia  ENT ROS: positive for - headaches, tinnitus, vertigo and visual changes  Allergy and Immunology ROS: negative  Hematological and Lymphatic ROS: negative  Endocrine ROS: negative  Respiratory ROS: no cough, shortness of breath, or wheezing  Cardiovascular ROS: no chest pain or dyspnea on exertion  Gastrointestinal ROS: no abdominal pain, change in bowel habits, or black or bloody stools  Genito-Urinary ROS: no dysuria, trouble voiding, or hematuria  Musculoskeletal ROS: positive for - joint pain, muscle pain and muscular weakness  Neurological ROS: positive for - dizziness, headaches, impaired coordination/balance, memory loss, numbness/tingling, visual changes and weakness  Dermatological ROS: negative      Medications reviewed:  Current Outpatient Medications   Medication Sig Dispense Refill    pseudoephedrine-ibuprofen (Advil Cold and Sinus)  mg tab Take  by mouth. Once daily      ibuprofen (Motrin IB) 200 mg tablet Take 200 mg by mouth. 1-2 per day      cetirizine (ZYRTEC) 10 mg tablet Take 10 mg by mouth.       cyclobenzaprine (FLEXERIL) 10 mg tablet Take 1 Tab by mouth two (2) times a day. 60 Tab 1    OTHER Cervical blood patch-C5-C6  Indications: Spinal headache after epidural steroid injection 1 Units 0    acetaZOLAMIDE (DIAMOX) 250 mg tablet Take 1 Tab by mouth two (2) times a day for 30 days. 60 Tab 1    butalbital-acetaminophen-caffeine (FIORICET, ESGIC) -40 mg per tablet Take 1 tab bid for 7 days, then 1 tab prn for severe headache 30 Tab 1    predniSONE (DELTASONE) 10 mg tablet Take 10 mg by mouth two (2) times a day. 20 Tab 0    LORazepam (ATIVAN) 2 mg tablet Take 1 tablet 30 minutes to 1 hour before MRI procedure 2 Tab 0    ciprofloxacin HCl (CIPRO) 500 mg tablet Take 1 Tab by mouth two (2) times a day. 5 Tab 0    albuterol (PROVENTIL HFA, VENTOLIN HFA, PROAIR HFA) 90 mcg/actuation inhaler Take  by inhalation. Objective:   Vitals: There were no vitals filed for this visit.             Lab Data Reviewed:  Lab Results   Component Value Date/Time    WBC 7.7 01/06/2021 10:30 AM    HCT 43.9 01/06/2021 10:30 AM    HGB 13.8 01/06/2021 10:30 AM    PLATELET 347 61/84/4688 10:30 AM       Lab Results   Component Value Date/Time    Sodium 140 01/06/2021 10:30 AM    Potassium 3.9 01/06/2021 10:30 AM    Chloride 108 01/06/2021 10:30 AM    CO2 25 01/06/2021 10:30 AM    Glucose 88 01/06/2021 10:30 AM    BUN 17 01/06/2021 10:30 AM    Creatinine 0.90 01/06/2021 10:30 AM    Calcium 8.8 01/06/2021 10:30 AM       No components found for: TROPQUANT    No results found for: SHOLA      No results found for: HBA1C, HGBE8, [id number], [id number]     No results found for: B12LT, FOL, RBCF    No results found for: SHOLA, Hellen Limb, XBANA    Lab Results   Component Value Date/Time    Cholesterol, total 308 (H) 01/06/2021 10:30 AM    HDL Cholesterol 67 01/06/2021 10:30 AM    LDL, calculated 225.6 (H) 01/06/2021 10:30 AM    VLDL, calculated 15.4 01/06/2021 10:30 AM    Triglyceride 77 01/06/2021 10:30 AM    CHOL/HDL Ratio 4.6 01/06/2021 10:30 AM         CT Results (recent):  Results from Hospital Encounter encounter on 09/12/19   CT HEAD WO CONT    Narrative EXAM:  CT head without contrast    INDICATION: Headache. COMPARISON: CT 8/30/2016    TECHNIQUE: Noncontrast head CT. Coronal and sagittal reformats. CT dose  reduction was achieved through use of a standardized protocol tailored for this  examination and automatic exposure control for dose modulation. Adaptive  statistical iterative reconstruction (ASIR) was utilized. FINDINGS: The ventricles and sulci are age-appropriate without hydrocephalus. There is no mass effect or midline shift. There is no intracranial hemorrhage or  extra-axial fluid collection. There is no abnormal parenchymal attenuation. The  gray-white matter differentiation is maintained. The basal cisterns are patent. The osseous structures are intact. The visualized paranasal sinuses and mastoid  air cells are clear. Impression IMPRESSION:     No acute intracranial abnormality. MRI Results (recent):  Results from [place] encounter on 03/19/21   MRI BRAIN W WO CONT    Narrative EXAM:  MRI BRAIN W WO CONT    INDICATION:    Headache. COMPARISON:  CT head 9/12/2019. CONTRAST: 15 ml Dotarem. TECHNIQUE:    Multiplanar multisequence acquisition without and with contrast of the brain. FINDINGS:  The ventricles are normal in size and position. The brain parenchyma has normal  signal characteristics. There is no acute infarct, hemorrhage, extra-axial fluid  collection, or mass effect. There is no cerebellar tonsillar herniation. Expected arterial flow-voids are present. No evidence of abnormal enhancement. Mild mucosal thickening in the left maxillary sinus and left ethmoidal air cells  without air-fluid level. The mastoid air cells and middle ears are clear. The  orbital contents are within normal limits. No significant osseous or scalp  lesions are identified. Impression 1. Normal brain MRI. IR Results (recent):   No results found for this or any previous visit. VAS/US Results (recent):  Results from Hospital Encounter encounter on 04/28/15   DUPLEX CAROTID BILATERAL       PHYSICAL EXAM:  General:    Alert, cooperative, no distress, appears stated age. Head:   Normocephalic, without obvious abnormality, atraumatic. Eyes:   Conjunctivae/corneas clear. Nose:  Nares normal. .  Throat:    Lips and tongue normal.    Neck:  Symmetrical,  no adenopathy, thyroid     no JVD. Back:    Symmetric  Lungs:   Deferred  Chest wall:   No Accessory muscle use. Heart:   Deferred. Abdomen:    Not distended. Extremities: Extremities normal, atraumatic, No cyanosis. No edema. No clubbing  Skin:      No rashes or lesions. Not Jaundiced  Lymph nodes: Cervical, supraclavicular normal.  Psych:  Good insight. Not depressed. Anxious. NEUROLOGICAL EXAM:  Appearance: The patient is well developed, well nourished, provides a coherent history and is in no acute distress. Mental Status: Oriented to time, place and person. Mood and affect appropriate. Cranial Nerves:   Intact visual fields. EOM's full, no nystagmus, no ptosis. Facial movement is symmetric. Hearing is normal bilaterally. Tongue is midline. Motor:   Moves all extremities . No fasciculations. Reflexes:   Deferred. Sensory:   Deferred. Gait:   Not assessed t. Tremor:   No tremor noted. Cerebellar:  No cerebellar signs present. Assesment  1. Spinal puncture headache  For blood patch    2. Intracranial hypotension following other procedure  Blood patch    3. Cervicalgia  Continue management    ___________________________________________________  PLAN: Medication and plan discussed with patient      ICD-10-CM ICD-9-CM    1. Spinal puncture headache  G97.1 349.0    2. Intracranial hypotension following other procedure  G97.84 458.8      998.89    3.  Cervicalgia  M54.2 723.1          ___________________________________________________    Attending Physician: Bebe Leung MD

## 2021-04-13 ENCOUNTER — TELEPHONE (OUTPATIENT)
Dept: NEUROLOGY | Age: 51
End: 2021-04-13

## 2021-04-13 NOTE — TELEPHONE ENCOUNTER
Patient called and you need to call scheduling at [address] [place] [address] [phone] they need the referral from [facility] and the order number and name and any  notes that need to be sent with it. She was told that Dr. Rosado Ip office needs to schedule and not to be done through the ER. Patient wants to go to [address] [place] [address]. Called in regards to setting up  lumbar blood patch either at [facility] or [address] [place] [address] Monday or Tuesday of next week. Stated that Dr. Franco Handley said it has to be schedule through the office .

## 2021-04-14 DIAGNOSIS — G97.1 SPINAL HEADACHE: Primary | ICD-10-CM

## 2021-04-15 ENCOUNTER — VIRTUAL VISIT (OUTPATIENT)
Dept: NEUROSURGERY | Age: 51
End: 2021-04-15
Payer: MEDICAID

## 2021-04-15 DIAGNOSIS — G44.59 OTHER COMPLICATED HEADACHE SYNDROME: Primary | ICD-10-CM

## 2021-04-15 PROCEDURE — 99204 OFFICE O/P NEW MOD 45 MIN: CPT | Performed by: RADIOLOGY

## 2021-04-15 NOTE — PROGRESS NOTES
Neurointerventional Surgery Clinic Note    Maggie Moody is a 52 y.o. female who was seen by synchronous (real-time) audio-video technology on 4/15/2021. Consent: Maggie Moody, who was seen by synchronous (real-time) audio-video technology, and/or her healthcare decision maker, is aware that this patient-initiated, Telehealth encounter on 4/15/2021 is a billable service, with coverage as determined by her insurance carrier. She is aware that she may receive a bill and has provided verbal consent to proceed: Yes. Assessment & Plan:   52 y.o. female with history of mononucleosis, palpitations, hyperlipidemia, and cervical degenerative disc disease who presents on referral from Dr. Tayo Christina on suspicion of intracranial hypotension. After a cervical steroid injection in January of this year, patient had onset of fatigue, weakness, headaches, dizziness, nausea, and blurred vision. Most of the symptoms are worse when standing for period of time, and they improve with laying flat. The blurred vision is persistent. Patient also has additional neurological complaints, including occasional difficulty swallowing, arm weakness worse on the right, occasional poor coordination of the left arm, occasional confusion and stumbling of words, and ringing of the ears. She also describes pressure in the left ear and fluid draining from the right ear. MRI cervical spine performed 2/10/2021 shows degenerative disc disease, worst at C5-C6 and C6-C7, but no evidence of CSF leak. MRI brain performed 3/19/2021 is grossly unremarkable, without obvious secondary signs of intracranial hypotension. We discussed the natural history of CSF leak and intracranial hypotension. I explained that if her symptoms are indeed related to intracranial hypotension, that treatment begins with optimize medical management, then proceeds to epidural blood patch/patches.   If patient is still experiences symptoms after these treatments, then referral to a high-volume CSF leak treatment center would be prudent. However, since her symptoms started after a cervical procedure, I would like to make sure that her vertebral arteries are intact. I will order a CTA of the head and neck for further evaluation. In addition, I will order MRIs of the thoracic and lumbar spine to evaluate for CSF leak in these regions. I advised the patient to stay well-hydrated, drink caffeine, and avoid bending, lifting, twisting, and anything that increases pressure in the head or puts strain on the head or spine. Once the imaging has been performed, I will see her back in clinic to review the findings and decide on whether to proceed with an epidural blood patch. Patient expressed understanding and is in agreement with this plan. Plan:  -CTA head and neck  -MRI thoracic spine with and without contrast  -MRI lumbar spine with and without contrast  -Return to clinic in 1 week, when imaging has been completed    I spent at least 45 minutes on this visit with this new patient. Subjective:   Pamela Merritt is a 52 y.o. female   With history of mononucleosis, palpitations, hyperlipidemia, and cervical degenerative disc disease who presents on referral from Dr. Keith Perez on suspicion of intracranial hypotension. Patient explained that in 2014 she started having pains in her right shoulder around the shoulder blade with numbness and tingling in her right fingers, hand, and arm without weakness. She saw Dr. Cindy Soto who referred her to Dr. Ebonie Christopher, who performed a steroid injection that relieved her symptoms. In 2017 she had a second steroid injection by Dr. Eobnie Christopher that also helped. In January of this year she return for another injection. The procedure was performed by Dr. Christiana Aguilar. Patient states that she felt different during the procedure, and afterwards she had soreness in the neck.   3 to 4 days after the procedure she had developed fatigue, weakness, and eventually a headache. Since January she has been experiencing the symptoms. She describes a pressure at the base of her skull. Her headache extends from the back to the front and is associated with dizziness and nausea. Sometimes she feels like she is going to pass out. She has also developed blurred vision which is constant. And more recently, she has developed tailbone pain. At the time of our virtual visit, her pain was approximately 7 out of 10 in severity. Patient was sitting in bed. She states that the headaches can extend up to 9-10 out of 10 in severity. When she lays flat, the headaches decreased to approximately 1 or 2 out of 10 in severity. The headaches are worse when standing, bending over, or squatting. Patient has also noticed some occasional difficulty with swallowing, she is not sure if is related to this or not. She states that her arms are generally weaker than before. She had an episode of numbness and tingling in her left hand which was attributed to carpal tunnel. This has since resolved after the prednisone treatment prescribed by Dr. Anderson President. Patient denies vomiting. She has blurry vision all the time, but no double vision. She reports occasional poor coordination in her left arm. She also has some confusion and stumbles with words when standing for too long. She believes that her balance is fine. She also reports ringing in both ears when it is quiet, a pressure sensation in her left ear, asymmetric decreased hearing in her left ear, and recurrent fluid production from her right ear. Patient had been prescribed Diamox, but she did not tolerate it well and she has since stopped taking it. She has been trying to rest, and is staying with a friend. She has been drinking some caffeine, however she has palpitations and is hesitant to drink more. Patient is a former smoker.     Chief Complaint: Headache (New patient)      Prior to Admission medications    Medication Sig Start Date End Date Taking? Authorizing Provider   pseudoephedrine-ibuprofen (Advil Cold and Sinus)  mg tab Take  by mouth. Once daily   Yes Provider, Historical   ibuprofen (Motrin IB) 200 mg tablet Take 200 mg by mouth. 1-2 per day   Yes Provider, Historical   cetirizine (ZYRTEC) 10 mg tablet Take 10 mg by mouth. Yes Provider, Historical   cyclobenzaprine (FLEXERIL) 10 mg tablet Take 1 Tab by mouth two (2) times a day. 3/11/21  Yes David Arredondo MD   albuterol (PROVENTIL HFA, VENTOLIN HFA, PROAIR HFA) 90 mcg/actuation inhaler Take  by inhalation. Yes Provider, Historical   OTHER Cervical blood patch-C5-C6  Indications: Spinal headache after epidural steroid injection 4/6/21   David Arredondo MD   acetaZOLAMIDE (DIAMOX) 250 mg tablet Take 1 Tab by mouth two (2) times a day for 30 days. 4/1/21 5/1/21  David Arredondo MD   butalbital-acetaminophen-caffeine (FIORICET, ESGIC) -40 mg per tablet Take 1 tab bid for 7 days, then 1 tab prn for severe headache 3/11/21   David Arredondo MD   predniSONE (DELTASONE) 10 mg tablet Take 10 mg by mouth two (2) times a day. 3/11/21   David Arredondo MD   LORazepam (ATIVAN) 2 mg tablet Take 1 tablet 30 minutes to 1 hour before MRI procedure 3/11/21   David Arredondo MD   ciprofloxacin HCl (CIPRO) 500 mg tablet Take 1 Tab by mouth two (2) times a day. 7/2/20   LIUDMILA Zuñiga     Allergies   Allergen Reactions    Sulfa (Sulfonamide Antibiotics) Swelling       Patient Active Problem List   Diagnosis Code    Other complicated headache syndrome G44.59     Patient Active Problem List    Diagnosis Date Noted    Other complicated headache syndrome 04/15/2021     Current Outpatient Medications   Medication Sig Dispense Refill    pseudoephedrine-ibuprofen (Advil Cold and Sinus)  mg tab Take  by mouth. Once daily      ibuprofen (Motrin IB) 200 mg tablet Take 200 mg by mouth. 1-2 per day      cetirizine (ZYRTEC) 10 mg tablet Take 10 mg by mouth.       cyclobenzaprine (FLEXERIL) 10 mg tablet Take 1 Tab by mouth two (2) times a day. 60 Tab 1    albuterol (PROVENTIL HFA, VENTOLIN HFA, PROAIR HFA) 90 mcg/actuation inhaler Take  by inhalation.  OTHER Cervical blood patch-C5-C6  Indications: Spinal headache after epidural steroid injection 1 Units 0    acetaZOLAMIDE (DIAMOX) 250 mg tablet Take 1 Tab by mouth two (2) times a day for 30 days. 60 Tab 1    butalbital-acetaminophen-caffeine (FIORICET, ESGIC) -40 mg per tablet Take 1 tab bid for 7 days, then 1 tab prn for severe headache 30 Tab 1    predniSONE (DELTASONE) 10 mg tablet Take 10 mg by mouth two (2) times a day. 20 Tab 0    LORazepam (ATIVAN) 2 mg tablet Take 1 tablet 30 minutes to 1 hour before MRI procedure 2 Tab 0    ciprofloxacin HCl (CIPRO) 500 mg tablet Take 1 Tab by mouth two (2) times a day. 5 Tab 0     Allergies   Allergen Reactions    Sulfa (Sulfonamide Antibiotics) Swelling     Past Medical History:   Diagnosis Date    Herniated cervical disc     c4, c6    High cholesterol     Mononucleosis     Palpitations      Past Surgical History:   Procedure Laterality Date    HX HEENT      nose     Family History   Problem Relation Age of Onset    Cancer Mother     Heart Disease Father     Breast Cancer Sister 52    Cancer Sister     Breast Cancer Maternal Aunt     Heart Disease Brother      Social History     Tobacco Use    Smoking status: Former Smoker     Quit date: 3/7/2015     Years since quittin.1    Smokeless tobacco: Former User     Quit date: 2015   Substance Use Topics    Alcohol use: Yes     Alcohol/week: 14.0 standard drinks     Types: 14 Cans of beer per week       ROS: Pertinent ROS included in HPI    Objective:   Vital Signs: (As obtained by patient/caregiver at home)  There were no vitals taken for this visit.        Constitutional: [x] Appears well-developed and well-nourished [x] No apparent distress      [] Abnormal -     Mental status: [x] Alert and awake  [x] Oriented to person/place/time [x] Able to follow commands    [] Abnormal -     Eyes:   EOM    [x]  Normal    [] Abnormal -   Sclera  [x]  Normal    [] Abnormal -          Discharge [x]  None visible   [] Abnormal -     HENT: [x] Normocephalic, atraumatic  [] Abnormal -   [x] Mouth/Throat: Mucous membranes are moist    External Ears [x] Normal  [] Abnormal -    Neck: [x] No visualized mass [] Abnormal -     Pulmonary/Chest: [x] Respiratory effort normal   [x] No visualized signs of difficulty breathing or respiratory distress        [] Abnormal -      Musculoskeletal:   [x] Normal gait with no signs of ataxia         [x] Normal range of motion of neck        [] Abnormal -     Neurological:        [x] No Facial Asymmetry (Cranial nerve 7 motor function) (limited exam due to video visit)          [x] No gaze palsy        [] Abnormal -          Skin:        [x] No significant exanthematous lesions or discoloration noted on facial skin         [] Abnormal -            Psychiatric:       [x] Normal Affect [] Abnormal -        [x] No Hallucinations    Other pertinent observable physical exam findings:-    Neurologic Exam:  Mental Status:  Alert and oriented x 4. Appropriate affect, mood and behavior. Language:    Normal fluency, repetition, comprehension and naming. Cranial Nerves:   Cannot assess pupillary reaction. Visual fields appear to be full to confrontation, but difficult to evaluate virtually. Extraocular movements intact. Facial sensation intact V1 - V3. Full facial strength, no asymmetry. Hearing intact bilaterally. No dysarthria. Tongue protrudes to midline, palate elevates symmetrically. Shoulder shrug 5/5 bilaterally. Motor:    No pronator drift. Bulk and tone appear to be normal.      Difficult to evaluate strength, but patient reports subjective asymmetric decreased strength in the right arm     No involuntary movements.     Sensation: Sensation grossly intact subjectively    Reflexes:    Deferred    Coordination & Gait: Gait normal, FTN intact. We discussed the expected course, resolution and complications of the diagnosis(es) in detail. Medication risks, benefits, costs, interactions, and alternatives were discussed as indicated. I advised her to contact the office if her condition worsens, changes or fails to improve as anticipated. She expressed understanding with the diagnosis(es) and plan. Juju Ro is a 52 y.o. female who was evaluated by a video visit encounter for concerns as above. Patient identification was verified prior to start of the visit. A caregiver was present when appropriate. Due to this being a TeleHealth encounter (During [facility]- public [facility] emergency), evaluation of the following organ systems was limited: Vitals/Constitutional/EENT/Resp/CV/GI//MS/Neuro/Skin/Heme-Lymph-Imm. Pursuant to the emergency declaration under the [facility], 1135 waiver authority and the [employer] and [employer] Act, this Virtual  Visit was conducted, with patient's (and/or legal guardian's) consent, to reduce the patient's risk of exposure to COVID-19 and provide necessary medical care. Services were provided through a video synchronous discussion virtually to substitute for in-person clinic visit. Patient was located at home, and provider was located in office. This visit was completed virtually using Doxy. beltran Rain MD

## 2021-04-15 NOTE — PROGRESS NOTES
Phone call to patient in preparation for Virtual visit with provider. Name and  verified. New patient referred by Dr Yumi Davey presenting with Spinal puncture headache. Patient reports constant headaches since last injection in 2021. Reports pain in head radiates from back to front. Pain level 9/10 most days. Reports she can only stand or sit for 20 minutes at a time before she lays down to get relief for pain. Reports constant stiffness on the right side of her neck and knifelike pain in her right shoulder. Last injection done by Dr Altagracia Vincent @ [address] Stopped taking Diamox on 2021 after 8 days due to noticing blotchy spots on her skin.

## 2021-04-15 NOTE — PATIENT INSTRUCTIONS
A Healthy Lifestyle: Care Instructions Your Care Instructions A healthy lifestyle can help you feel good, stay at a healthy weight, and have plenty of energy for both work and play. A healthy lifestyle is something you can share with your whole family. A healthy lifestyle also can lower your risk for serious health problems, such as high blood pressure, heart disease, and diabetes. You can follow a few steps listed below to improve your health and the health of your family. Follow-up care is a key part of your treatment and safety. Be sure to make and go to all appointments, and call your doctor if you are having problems. It's also a good idea to know your test results and keep a list of the medicines you take. How can you care for yourself at home? · Do not eat too much sugar, fat, or fast foods. You can still have dessert and treats now and then. The goal is moderation. · Start small to improve your eating habits. Pay attention to portion sizes, drink less juice and soda pop, and eat more fruits and vegetables. ? Eat a healthy amount of food. A 3-ounce serving of meat, for example, is about the size of a deck of cards. Fill the rest of your plate with vegetables and whole grains. ? Limit the amount of soda and sports drinks you have every day. Drink more water when you are thirsty. ? Eat plenty of fruits and vegetables every day. Have an apple or some carrot sticks as an afternoon snack instead of a candy bar. Try to have fruits and/or vegetables at every meal. 
· Make exercise part of your daily routine. You may want to start with simple activities, such as walking, bicycling, or slow swimming. Try to be active 30 to 60 minutes every day. You do not need to do all 30 to 60 minutes all at once. For example, you can exercise 3 times a day for 10 or 20 minutes.  Moderate exercise is safe for most people, but it is always a good idea to talk to your doctor before starting an exercise program. 
· Keep moving. Mario Kay the lawn, work in the garden, or [employer]. Take the stairs instead of the elevator at work. · If you smoke, quit. People who smoke have an increased risk for heart attack, stroke, cancer, and other lung illnesses. Quitting is hard, but there are ways to boost your chance of quitting tobacco for good. ? Use nicotine gum, patches, or lozenges. ? Ask your doctor about stop-smoking programs and medicines. ? Keep trying. In addition to reducing your risk of diseases in the future, you will notice some benefits soon after you stop using tobacco. If you have shortness of breath or asthma symptoms, they will likely get better within a few weeks after you quit. · Limit how much alcohol you drink. Moderate amounts of alcohol (up to 2 drinks a day for men, 1 drink a day for women) are okay. But drinking too much can lead to liver problems, high blood pressure, and other health problems. Family health If you have a family, there are many things you can do together to improve your health. · Eat meals together as a family as often as possible. · Eat healthy foods. This includes fruits, vegetables, lean meats and dairy, and whole grains. · Include your family in your fitness plan. Most people think of activities such as jogging or tennis as the way to fitness, but there are many ways you and your family can be more active. Anything that makes you breathe hard and gets your heart pumping is exercise. Here are some tips: 
? Walk to do errands or to take your child to school or the bus. 
? Go for a family bike ride after dinner instead of watching TV. Where can you learn more? Go to http://www.gray.com/ Enter P649 in the search box to learn more about \"A Healthy Lifestyle: Care Instructions. \" Current as of: September 23, 2020               Content Version: 12.8 © 8878-0330 [employer].   
Care instructions adapted under license by [employer] (which disclaims liability or warranty for this information). If you have questions about a medical condition or this instruction, always ask your healthcare professional. [address] any warranty or liability for your use of this information.

## 2021-04-16 ENCOUNTER — TELEPHONE (OUTPATIENT)
Dept: NEUROSURGERY | Age: 51
End: 2021-04-16

## 2021-04-16 DIAGNOSIS — G44.59 OTHER COMPLICATED HEADACHE SYNDROME: Primary | ICD-10-CM

## 2021-04-16 RX ORDER — LORAZEPAM 1 MG/1
1 TABLET ORAL ONCE
Qty: 1 TAB | Refills: 0 | Status: SHIPPED | OUTPATIENT
Start: 2021-04-16 | End: 2021-04-16

## 2021-04-16 NOTE — TELEPHONE ENCOUNTER
Patient asked could she have a prescription sent to [facility] on [facility]. She stated that she has anxiety and will need something to calm her during her scan. The time for the scans are 10:30 and 12:45.

## 2021-04-19 ENCOUNTER — HOSPITAL ENCOUNTER (OUTPATIENT)
Dept: MRI IMAGING | Age: 51
Discharge: HOME | End: 2021-04-19
Attending: RADIOLOGY
Payer: MEDICAID

## 2021-04-19 ENCOUNTER — APPOINTMENT (OUTPATIENT)
Dept: CT IMAGING | Age: 51
End: 2021-04-19
Attending: RADIOLOGY
Payer: MEDICAID

## 2021-04-19 ENCOUNTER — HOSPITAL ENCOUNTER (OUTPATIENT)
Dept: CT IMAGING | Age: 51
Discharge: HOME | End: 2021-04-19
Attending: RADIOLOGY
Payer: MEDICAID

## 2021-04-19 VITALS — BODY MASS INDEX: 29.05 KG/M2 | WEIGHT: 180 LBS

## 2021-04-19 DIAGNOSIS — G44.59 OTHER COMPLICATED HEADACHE SYNDROME: ICD-10-CM

## 2021-04-19 PROCEDURE — 72157 MRI CHEST SPINE W/O & W/DYE: CPT

## 2021-04-19 PROCEDURE — 70496 CT ANGIOGRAPHY HEAD: CPT

## 2021-04-19 PROCEDURE — 74011250636 HC RX REV CODE- 250/636: Performed by: RADIOLOGY

## 2021-04-19 PROCEDURE — A9575 INJ GADOTERATE MEGLUMI 0.1ML: HCPCS | Performed by: RADIOLOGY

## 2021-04-19 PROCEDURE — 72158 MRI LUMBAR SPINE W/O & W/DYE: CPT

## 2021-04-19 PROCEDURE — 74011000636 HC RX REV CODE- 636: Performed by: RADIOLOGY

## 2021-04-19 RX ORDER — GADOTERATE MEGLUMINE 376.9 MG/ML
17 INJECTION INTRAVENOUS ONCE
Status: COMPLETED | OUTPATIENT
Start: 2021-04-19 | End: 2021-04-19

## 2021-04-19 RX ADMIN — IOPAMIDOL 100 ML: 755 INJECTION, SOLUTION INTRAVENOUS at 10:00

## 2021-04-19 RX ADMIN — GADOTERATE MEGLUMINE 17 ML: 376.9 INJECTION INTRAVENOUS at 12:30

## 2021-04-23 ENCOUNTER — VIRTUAL VISIT (OUTPATIENT)
Dept: NEUROSURGERY | Age: 51
End: 2021-04-23
Payer: MEDICAID

## 2021-04-23 DIAGNOSIS — G44.59 OTHER COMPLICATED HEADACHE SYNDROME: Primary | ICD-10-CM

## 2021-04-23 PROCEDURE — 99213 OFFICE O/P EST LOW 20 MIN: CPT | Performed by: RADIOLOGY

## 2021-04-23 NOTE — PROGRESS NOTES
Phone call to patient in preparation for Virtual visit with provider. Name and  verified. Patient unable to obtain vital signs at home. Follow up for complicated headache syndrome and imaging results. Patient reports she continues with headaches, positional dizziness and blurred vision. No significant changes reported.

## 2021-04-23 NOTE — H&P (VIEW-ONLY)
Neurointerventional Surgery Clinic Note    Orlando Babinski is a 52 y.o. female who was seen by synchronous (real-time) audio-video technology on 4/23/2021. Consent: Orlando Babinski, who was seen by synchronous (real-time) audio-video technology, and/or her healthcare decision maker, is aware that this patient-initiated, Telehealth encounter on 4/23/2021 is a billable service, with coverage as determined by her insurance carrier. She is aware that she may receive a bill and has provided verbal consent to proceed: Yes. Assessment & Plan:   52 y.o. female with history of mononucleosis, palpitations, hyperlipidemia, and cervical degenerative disc disease who presents on follow-up for suspicion of intracranial hypotension. CTA head and neck, MRI thoracic spine, and MRI lumbar spine were performed. There was no evidence of vascular injury or obvious CSF leak. Patient symptoms remain unchanged. Neurologic exam is also essentially unchanged. Patient still has fluid drainage from the right ear, although she states that it is slightly less. She has also noticed some fluid in her left ear too. Since there is no evidence of CSF leak on the MRI imaging of the spine, I would like to know if the fluid drainage from her ear could be CSF. I will refer her to ENT for further evaluation. In the meantime, she clearly has symptoms that seem to be related to intracranial hypotension. We will proceed with a single level epidural blood patch since her symptoms have not resolved yet. I again advised the patient to look into establishing care at [facility]. If the epidural blood patch does not help with her symptoms, it would be helpful to be further head with the process of getting evaluated there. Patient expressed understanding and is in agreement with this plan.     Plan:  -Referral to ENT for evaluation of fluid drainage from ears, right greater than left  -Proceed with single level epidural blood patch    I spent at least 20 minutes on this visit with this established patient. Subjective:   Nathan Cantor is a 52 y.o. female   with history of mononucleosis, palpitations, hyperlipidemia, and cervical degenerative disc disease who presents on follow-up for suspicion of intracranial hypotension. CTA head and neck, MRI thoracic spine, and MRI lumbar spine were performed. There was no evidence of vascular injury or obvious CSF leak. Patient symptoms remain unchanged. She states that she is no better and no worse than when we spoke last at her visit on 4/15/2021. When asked about the fluid drainage from her right ear, she states that it has slightly decreased and that she has also noticed a little in the left ear too. Chief Complaint: No chief complaint on file. Prior to Admission medications    Medication Sig Start Date End Date Taking? Authorizing Provider   pseudoephedrine-ibuprofen (Advil Cold and Sinus)  mg tab Take  by mouth. Once daily   Yes Provider, Historical   cetirizine (ZYRTEC) 10 mg tablet Take 10 mg by mouth. Yes Provider, Historical   cyclobenzaprine (FLEXERIL) 10 mg tablet Take 1 Tab by mouth two (2) times a day. 3/11/21  Yes David Arredondo MD   albuterol (PROVENTIL HFA, VENTOLIN HFA, PROAIR HFA) 90 mcg/actuation inhaler Take  by inhalation. Yes Provider, Historical   OTHER Cervical blood patch-C5-C6  Indications: Spinal headache after epidural steroid injection 4/6/21   David Arredondo MD   acetaZOLAMIDE (DIAMOX) 250 mg tablet Take 1 Tab by mouth two (2) times a day for 30 days. 4/1/21 5/1/21  David Arredondo MD   ibuprofen (Motrin IB) 200 mg tablet Take 200 mg by mouth. 1-2 per day    Provider, Historical   butalbital-acetaminophen-caffeine (FIORICET, ESGIC) -40 mg per tablet Take 1 tab bid for 7 days, then 1 tab prn for severe headache 3/11/21   David Arredondo MD   predniSONE (DELTASONE) 10 mg tablet Take 10 mg by mouth two (2) times a day.  3/11/21   Sol Todd MD LORazepam (ATIVAN) 2 mg tablet Take 1 tablet 30 minutes to 1 hour before MRI procedure 3/11/21   David Arredondo MD   ciprofloxacin HCl (CIPRO) 500 mg tablet Take 1 Tab by mouth two (2) times a day. 7/2/20   LIUDMILA Alex     Allergies   Allergen Reactions    Sulfa (Sulfonamide Antibiotics) Swelling       Patient Active Problem List   Diagnosis Code    Other complicated headache syndrome G44.59     Patient Active Problem List    Diagnosis Date Noted    Other complicated headache syndrome 04/15/2021     Current Outpatient Medications   Medication Sig Dispense Refill    pseudoephedrine-ibuprofen (Advil Cold and Sinus)  mg tab Take  by mouth. Once daily      cetirizine (ZYRTEC) 10 mg tablet Take 10 mg by mouth.  cyclobenzaprine (FLEXERIL) 10 mg tablet Take 1 Tab by mouth two (2) times a day. 60 Tab 1    albuterol (PROVENTIL HFA, VENTOLIN HFA, PROAIR HFA) 90 mcg/actuation inhaler Take  by inhalation.  OTHER Cervical blood patch-C5-C6  Indications: Spinal headache after epidural steroid injection 1 Units 0    acetaZOLAMIDE (DIAMOX) 250 mg tablet Take 1 Tab by mouth two (2) times a day for 30 days. 60 Tab 1    ibuprofen (Motrin IB) 200 mg tablet Take 200 mg by mouth. 1-2 per day      butalbital-acetaminophen-caffeine (FIORICET, ESGIC) -40 mg per tablet Take 1 tab bid for 7 days, then 1 tab prn for severe headache 30 Tab 1    predniSONE (DELTASONE) 10 mg tablet Take 10 mg by mouth two (2) times a day. 20 Tab 0    LORazepam (ATIVAN) 2 mg tablet Take 1 tablet 30 minutes to 1 hour before MRI procedure 2 Tab 0    ciprofloxacin HCl (CIPRO) 500 mg tablet Take 1 Tab by mouth two (2) times a day.  5 Tab 0     Allergies   Allergen Reactions    Sulfa (Sulfonamide Antibiotics) Swelling     Past Medical History:   Diagnosis Date    Herniated cervical disc 2014    c4, c6    High cholesterol     Mononucleosis     Palpitations 2016     Past Surgical History:   Procedure Laterality Date    HX HEENT      nose     Family History   Problem Relation Age of Onset    Cancer Mother     Heart Disease Father     Breast Cancer Sister 52    Cancer Sister     Breast Cancer Maternal Aunt     Heart Disease Brother      Social History     Tobacco Use    Smoking status: Former Smoker     Quit date: 3/7/2015     Years since quittin.1    Smokeless tobacco: Former User     Quit date: 2015   Substance Use Topics    Alcohol use: Yes     Alcohol/week: 14.0 standard drinks     Types: 14 Cans of beer per week       ROS: Pertinent ROS included in HPI    Objective:   Vital Signs: (As obtained by patient/caregiver at home)  There were no vitals taken for this visit.        Constitutional: [x] Appears well-developed and well-nourished [x] No apparent distress      [] Abnormal -     Mental status: [x] Alert and awake  [x] Oriented to person/place/time [x] Able to follow commands    [] Abnormal -     Eyes:   EOM    [x]  Normal    [] Abnormal -   Sclera  [x]  Normal    [] Abnormal -          Discharge [x]  None visible   [] Abnormal -     HENT: [x] Normocephalic, atraumatic  [] Abnormal -   [x] Mouth/Throat: Mucous membranes are moist    External Ears [x] Normal  [] Abnormal -    Neck: [x] No visualized mass [] Abnormal -     Pulmonary/Chest: [x] Respiratory effort normal   [x] No visualized signs of difficulty breathing or respiratory distress        [] Abnormal -      Musculoskeletal:   [x] Normal gait with no signs of ataxia         [x] Normal range of motion of neck        [] Abnormal -     Neurological:        [x] No Facial Asymmetry (Cranial nerve 7 motor function) (limited exam due to video visit)          [x] No gaze palsy        [] Abnormal -          Skin:        [x] No significant exanthematous lesions or discoloration noted on facial skin         [] Abnormal -            Psychiatric:       [x] Normal Affect [] Abnormal -        [x] No Hallucinations    Other pertinent observable physical exam findings:-    Neurologic Exam:  Mental Status:  Alert and oriented x 4. Appropriate affect, mood and behavior. Language:    Normal fluency, repetition, comprehension and naming. Cranial Nerves:   Cannot assess pupillary reaction. Visual fields appear to be full to confrontation, but difficult to evaluate virtually. Extraocular movements intact. Facial sensation intact V1 - V3; asymmetric slight pressure sensation when touching the right V2 distribution. Full facial strength, no asymmetry. Hearing intact bilaterally. No dysarthria. Tongue protrudes to midline, palate elevates symmetrically. Shoulder shrug 5/5 bilaterally. Motor:    No pronator drift. Bulk and tone appear to be normal.      Difficult to evaluate strength, but patient reports subjective asymmetric decreased strength in the right arm     No involuntary movements. Sensation:    Sensation grossly intact subjectively    Reflexes:    Deferred    Coordination & Gait: Gait normal.     We discussed the expected course, resolution and complications of the diagnosis(es) in detail. Medication risks, benefits, costs, interactions, and alternatives were discussed as indicated. I advised her to contact the office if her condition worsens, changes or fails to improve as anticipated. She expressed understanding with the diagnosis(es) and plan. Boston Babb is a 52 y.o. female who was evaluated by a video visit encounter for concerns as above. Patient identification was verified prior to start of the visit. A caregiver was present when appropriate. Due to this being a TeleHealth encounter (During [place]-32 public health emergency), evaluation of the following organ systems was limited: Vitals/Constitutional/EENT/Resp/CV/GI//MS/Neuro/Skin/Heme-Lymph-Imm.   Pursuant to the emergency declaration under the [address] and Response Supplemental Appropriations Act, this Virtual  Visit was conducted, with patient's (and/or legal guardian's) consent, to reduce the patient's risk of exposure to COVID-19 and provide necessary medical care. Services were provided through a video synchronous discussion virtually to substitute for in-person clinic visit. Patient was located at home, and provider was located in office. This visit was completed virtually using Doxy. beltran Mendez MD

## 2021-04-23 NOTE — PATIENT INSTRUCTIONS
A Healthy Lifestyle: Care Instructions Your Care Instructions A healthy lifestyle can help you feel good, stay at a healthy weight, and have plenty of energy for both work and play. A healthy lifestyle is something you can share with your whole family. A healthy lifestyle also can lower your risk for serious health problems, such as high blood pressure, heart disease, and diabetes. You can follow a few steps listed below to improve your health and the health of your family. Follow-up care is a key part of your treatment and safety. Be sure to make and go to all appointments, and call your doctor if you are having problems. It's also a good idea to know your test results and keep a list of the medicines you take. How can you care for yourself at home? · Do not eat too much sugar, fat, or fast foods. You can still have dessert and treats now and then. The goal is moderation. · Start small to improve your eating habits. Pay attention to portion sizes, drink less juice and soda pop, and eat more fruits and vegetables. ? Eat a healthy amount of food. A 3-ounce serving of meat, for example, is about the size of a deck of cards. Fill the rest of your plate with vegetables and whole grains. ? Limit the amount of soda and sports drinks you have every day. Drink more water when you are thirsty. ? Eat plenty of fruits and vegetables every day. Have an apple or some carrot sticks as an afternoon snack instead of a candy bar. Try to have fruits and/or vegetables at every meal. 
· Make exercise part of your daily routine. You may want to start with simple activities, such as walking, bicycling, or slow swimming. Try to be active 30 to 60 minutes every day. You do not need to do all 30 to 60 minutes all at once. For example, you can exercise 3 times a day for 10 or 20 minutes.  Moderate exercise is safe for most people, but it is always a good idea to talk to your doctor before starting an exercise program. 
· Keep moving. Darcie Musty the lawn, work in the garden, or [employer]. Take the stairs instead of the elevator at work. · If you smoke, quit. People who smoke have an increased risk for heart attack, stroke, cancer, and other lung illnesses. Quitting is hard, but there are ways to boost your chance of quitting tobacco for good. ? Use nicotine gum, patches, or lozenges. ? Ask your doctor about stop-smoking programs and medicines. ? Keep trying. In addition to reducing your risk of diseases in the future, you will notice some benefits soon after you stop using tobacco. If you have shortness of breath or asthma symptoms, they will likely get better within a few weeks after you quit. · Limit how much alcohol you drink. Moderate amounts of alcohol (up to 2 drinks a day for men, 1 drink a day for women) are okay. But drinking too much can lead to liver problems, high blood pressure, and other health problems. Family health If you have a family, there are many things you can do together to improve your health. · Eat meals together as a family as often as possible. · Eat healthy foods. This includes fruits, vegetables, lean meats and dairy, and whole grains. · Include your family in your fitness plan. Most people think of activities such as jogging or tennis as the way to fitness, but there are many ways you and your family can be more active. Anything that makes you breathe hard and gets your heart pumping is exercise. Here are some tips: 
? Walk to do errands or to take your child to school or the bus. 
? Go for a family bike ride after dinner instead of watching TV. Where can you learn more? Go to http://www.gray.com/ Enter L598 in the search box to learn more about \"A Healthy Lifestyle: Care Instructions. \" Current as of: September 23, 2020               Content Version: 12.8 © 1227-7228 [employer].   
Care instructions adapted under license by [employer] (which disclaims liability or warranty for this information). If you have questions about a medical condition or this instruction, always ask your healthcare professional. [address] any warranty or liability for your use of this information.

## 2021-04-23 NOTE — PROGRESS NOTES
Neurointerventional Surgery Clinic Note    Helen Brownlee is a 52 y.o. female who was seen by synchronous (real-time) audio-video technology on 4/23/2021. Consent: Helen Brownlee, who was seen by synchronous (real-time) audio-video technology, and/or her healthcare decision maker, is aware that this patient-initiated, Telehealth encounter on 4/23/2021 is a billable service, with coverage as determined by her insurance carrier. She is aware that she may receive a bill and has provided verbal consent to proceed: Yes. Assessment & Plan:   52 y.o. female with history of mononucleosis, palpitations, hyperlipidemia, and cervical degenerative disc disease who presents on follow-up for suspicion of intracranial hypotension. CTA head and neck, MRI thoracic spine, and MRI lumbar spine were performed. There was no evidence of vascular injury or obvious CSF leak. Patient symptoms remain unchanged. Neurologic exam is also essentially unchanged. Patient still has fluid drainage from the right ear, although she states that it is slightly less. She has also noticed some fluid in her left ear too. Since there is no evidence of CSF leak on the MRI imaging of the spine, I would like to know if the fluid drainage from her ear could be CSF. I will refer her to ENT for further evaluation. In the meantime, she clearly has symptoms that seem to be related to intracranial hypotension. We will proceed with a single level epidural blood patch since her symptoms have not resolved yet. I again advised the patient to look into establishing care at [facility]. If the epidural blood patch does not help with her symptoms, it would be helpful to be further head with the process of getting evaluated there. Patient expressed understanding and is in agreement with this plan.     Plan:  -Referral to ENT for evaluation of fluid drainage from ears, right greater than left  -Proceed with single level epidural blood patch    I spent at least 20 minutes on this visit with this established patient. Subjective:   Crista Landau is a 52 y.o. female   with history of mononucleosis, palpitations, hyperlipidemia, and cervical degenerative disc disease who presents on follow-up for suspicion of intracranial hypotension. CTA head and neck, MRI thoracic spine, and MRI lumbar spine were performed. There was no evidence of vascular injury or obvious CSF leak. Patient symptoms remain unchanged. She states that she is no better and no worse than when we spoke last at her visit on 4/15/2021. When asked about the fluid drainage from her right ear, she states that it has slightly decreased and that she has also noticed a little in the left ear too. Chief Complaint: No chief complaint on file. Prior to Admission medications    Medication Sig Start Date End Date Taking? Authorizing Provider   pseudoephedrine-ibuprofen (Advil Cold and Sinus)  mg tab Take  by mouth. Once daily   Yes Provider, Historical   cetirizine (ZYRTEC) 10 mg tablet Take 10 mg by mouth. Yes Provider, Historical   cyclobenzaprine (FLEXERIL) 10 mg tablet Take 1 Tab by mouth two (2) times a day. 3/11/21  Yes David Arredondo MD   albuterol (PROVENTIL HFA, VENTOLIN HFA, PROAIR HFA) 90 mcg/actuation inhaler Take  by inhalation. Yes Provider, Historical   OTHER Cervical blood patch-C5-C6  Indications: Spinal headache after epidural steroid injection 4/6/21   David Arredondo MD   acetaZOLAMIDE (DIAMOX) 250 mg tablet Take 1 Tab by mouth two (2) times a day for 30 days. 4/1/21 5/1/21  David Arredondo MD   ibuprofen (Motrin IB) 200 mg tablet Take 200 mg by mouth. 1-2 per day    Provider, Historical   butalbital-acetaminophen-caffeine (FIORICET, ESGIC) -40 mg per tablet Take 1 tab bid for 7 days, then 1 tab prn for severe headache 3/11/21   David Arredondo MD   predniSONE (DELTASONE) 10 mg tablet Take 10 mg by mouth two (2) times a day.  3/11/21   Cheyanne Gtz MD LORazepam (ATIVAN) 2 mg tablet Take 1 tablet 30 minutes to 1 hour before MRI procedure 3/11/21   David Arredondo MD   ciprofloxacin HCl (CIPRO) 500 mg tablet Take 1 Tab by mouth two (2) times a day. 7/2/20   LIUDMILA Charles     Allergies   Allergen Reactions    Sulfa (Sulfonamide Antibiotics) Swelling       Patient Active Problem List   Diagnosis Code    Other complicated headache syndrome G44.59     Patient Active Problem List    Diagnosis Date Noted    Other complicated headache syndrome 04/15/2021     Current Outpatient Medications   Medication Sig Dispense Refill    pseudoephedrine-ibuprofen (Advil Cold and Sinus)  mg tab Take  by mouth. Once daily      cetirizine (ZYRTEC) 10 mg tablet Take 10 mg by mouth.  cyclobenzaprine (FLEXERIL) 10 mg tablet Take 1 Tab by mouth two (2) times a day. 60 Tab 1    albuterol (PROVENTIL HFA, VENTOLIN HFA, PROAIR HFA) 90 mcg/actuation inhaler Take  by inhalation.  OTHER Cervical blood patch-C5-C6  Indications: Spinal headache after epidural steroid injection 1 Units 0    acetaZOLAMIDE (DIAMOX) 250 mg tablet Take 1 Tab by mouth two (2) times a day for 30 days. 60 Tab 1    ibuprofen (Motrin IB) 200 mg tablet Take 200 mg by mouth. 1-2 per day      butalbital-acetaminophen-caffeine (FIORICET, ESGIC) -40 mg per tablet Take 1 tab bid for 7 days, then 1 tab prn for severe headache 30 Tab 1    predniSONE (DELTASONE) 10 mg tablet Take 10 mg by mouth two (2) times a day. 20 Tab 0    LORazepam (ATIVAN) 2 mg tablet Take 1 tablet 30 minutes to 1 hour before MRI procedure 2 Tab 0    ciprofloxacin HCl (CIPRO) 500 mg tablet Take 1 Tab by mouth two (2) times a day.  5 Tab 0     Allergies   Allergen Reactions    Sulfa (Sulfonamide Antibiotics) Swelling     Past Medical History:   Diagnosis Date    Herniated cervical disc 2014    c4, c6    High cholesterol     Mononucleosis     Palpitations 2016     Past Surgical History:   Procedure Laterality Date    HX HEENT      nose     Family History   Problem Relation Age of Onset    Cancer Mother     Heart Disease Father     Breast Cancer Sister 52    Cancer Sister     Breast Cancer Maternal Aunt     Heart Disease Brother      Social History     Tobacco Use    Smoking status: Former Smoker     Quit date: 3/7/2015     Years since quittin.1    Smokeless tobacco: Former User     Quit date: 2015   Substance Use Topics    Alcohol use: Yes     Alcohol/week: 14.0 standard drinks     Types: 14 Cans of beer per week       ROS: Pertinent ROS included in HPI    Objective:   Vital Signs: (As obtained by patient/caregiver at home)  There were no vitals taken for this visit.        Constitutional: [x] Appears well-developed and well-nourished [x] No apparent distress      [] Abnormal -     Mental status: [x] Alert and awake  [x] Oriented to person/place/time [x] Able to follow commands    [] Abnormal -     Eyes:   EOM    [x]  Normal    [] Abnormal -   Sclera  [x]  Normal    [] Abnormal -          Discharge [x]  None visible   [] Abnormal -     HENT: [x] Normocephalic, atraumatic  [] Abnormal -   [x] Mouth/Throat: Mucous membranes are moist    External Ears [x] Normal  [] Abnormal -    Neck: [x] No visualized mass [] Abnormal -     Pulmonary/Chest: [x] Respiratory effort normal   [x] No visualized signs of difficulty breathing or respiratory distress        [] Abnormal -      Musculoskeletal:   [x] Normal gait with no signs of ataxia         [x] Normal range of motion of neck        [] Abnormal -     Neurological:        [x] No Facial Asymmetry (Cranial nerve 7 motor function) (limited exam due to video visit)          [x] No gaze palsy        [] Abnormal -          Skin:        [x] No significant exanthematous lesions or discoloration noted on facial skin         [] Abnormal -            Psychiatric:       [x] Normal Affect [] Abnormal -        [x] No Hallucinations    Other pertinent observable physical exam findings:-    Neurologic Exam:  Mental Status:  Alert and oriented x 4. Appropriate affect, mood and behavior. Language:    Normal fluency, repetition, comprehension and naming. Cranial Nerves:   Cannot assess pupillary reaction. Visual fields appear to be full to confrontation, but difficult to evaluate virtually. Extraocular movements intact. Facial sensation intact V1 - V3; asymmetric slight pressure sensation when touching the right V2 distribution. Full facial strength, no asymmetry. Hearing intact bilaterally. No dysarthria. Tongue protrudes to midline, palate elevates symmetrically. Shoulder shrug 5/5 bilaterally. Motor:    No pronator drift. Bulk and tone appear to be normal.      Difficult to evaluate strength, but patient reports subjective asymmetric decreased strength in the right arm     No involuntary movements. Sensation:    Sensation grossly intact subjectively    Reflexes:    Deferred    Coordination & Gait: Gait normal.     We discussed the expected course, resolution and complications of the diagnosis(es) in detail. Medication risks, benefits, costs, interactions, and alternatives were discussed as indicated. I advised her to contact the office if her condition worsens, changes or fails to improve as anticipated. She expressed understanding with the diagnosis(es) and plan. Rosalie Boas is a 52 y.o. female who was evaluated by a video visit encounter for concerns as above. Patient identification was verified prior to start of the visit. A caregiver was present when appropriate. Due to this being a TeleHealth encounter (During [place] [address] public health emergency), evaluation of the following organ systems was limited: Vitals/Constitutional/EENT/Resp/CV/GI//MS/Neuro/Skin/Heme-Lymph-Imm.   Pursuant to the emergency declaration under the [address] and Response Supplemental Appropriations Act, this Virtual  Visit was conducted, with patient's (and/or legal guardian's) consent, to reduce the patient's risk of exposure to COVID-19 and provide necessary medical care. Services were provided through a video synchronous discussion virtually to substitute for in-person clinic visit. Patient was located at home, and provider was located in office. This visit was completed virtually using DoxyCierra Krueger MD

## 2021-04-26 ENCOUNTER — TELEPHONE (OUTPATIENT)
Dept: NEUROSURGERY | Age: 51
End: 2021-04-26

## 2021-04-26 DIAGNOSIS — G44.59 OTHER COMPLICATED HEADACHE SYNDROME: Primary | ICD-10-CM

## 2021-04-26 NOTE — TELEPHONE ENCOUNTER
----- Message from Bharath Rudolph MD sent at 4/23/2021  5:18 PM EDT -----  Plan:  -Referral to ENT for evaluation of fluid drainage from ears, right greater than left  -Proceed with single level epidural blood patch    Please place referral to ENT and schedule epidural blood patch, preferably at [address] [place] [address].  Thanks

## 2021-04-26 NOTE — TELEPHONE ENCOUNTER
Spoke to patient to confirm Blood patch on 4/29/2021 at [facility]. Arrival time 7:00 am 2nd floor, Patient registration. Patient stated understanding.

## 2021-04-26 NOTE — TELEPHONE ENCOUNTER
----- Message from Cam Almaraz MD sent at 4/23/2021  5:18 PM EDT -----  Plan:  -Referral to ENT for evaluation of fluid drainage from ears, right greater than left  -Proceed with single level epidural blood patch    Please place referral to ENT and schedule epidural blood patch, preferably at [address] [place] [address].  Thanks

## 2021-04-29 ENCOUNTER — HOSPITAL ENCOUNTER (OUTPATIENT)
Dept: INTERVENTIONAL RADIOLOGY/VASCULAR | Age: 51
Discharge: HOME | End: 2021-04-29
Attending: RADIOLOGY | Admitting: RADIOLOGY
Payer: MEDICAID

## 2021-04-29 VITALS
HEART RATE: 77 BPM | SYSTOLIC BLOOD PRESSURE: 135 MMHG | BODY MASS INDEX: 30.49 KG/M2 | WEIGHT: 189.7 LBS | DIASTOLIC BLOOD PRESSURE: 80 MMHG | TEMPERATURE: 98.1 F | OXYGEN SATURATION: 100 % | HEIGHT: 66 IN | RESPIRATION RATE: 15 BRPM

## 2021-04-29 DIAGNOSIS — G44.59 COMPLICATED HEADACHE SYNDROMES: ICD-10-CM

## 2021-04-29 PROCEDURE — 77030003666 HC NDL SPINAL BD -A

## 2021-04-29 PROCEDURE — 77003 FLUOROGUIDE FOR SPINE INJECT: CPT

## 2021-04-29 PROCEDURE — 74011250636 HC RX REV CODE- 250/636: Performed by: RADIOLOGY

## 2021-04-29 PROCEDURE — 76000 FLUOROSCOPY <1 HR PHYS/QHP: CPT

## 2021-04-29 PROCEDURE — 99153 MOD SED SAME PHYS/QHP EA: CPT

## 2021-04-29 PROCEDURE — 74011000636 HC RX REV CODE- 636: Performed by: RADIOLOGY

## 2021-04-29 PROCEDURE — 77030019698 HC SYR ANGI MDLON MRTM -A

## 2021-04-29 PROCEDURE — 99152 MOD SED SAME PHYS/QHP 5/>YRS: CPT

## 2021-04-29 PROCEDURE — 74011000250 HC RX REV CODE- 250: Performed by: RADIOLOGY

## 2021-04-29 RX ORDER — FENTANYL CITRATE 50 UG/ML
25-100 INJECTION, SOLUTION INTRAMUSCULAR; INTRAVENOUS
Status: DISCONTINUED | OUTPATIENT
Start: 2021-04-29 | End: 2021-04-29 | Stop reason: HOSPADM

## 2021-04-29 RX ORDER — LIDOCAINE HYDROCHLORIDE 10 MG/ML
1-5 INJECTION, SOLUTION EPIDURAL; INFILTRATION; INTRACAUDAL; PERINEURAL
Status: DISCONTINUED | OUTPATIENT
Start: 2021-04-29 | End: 2021-04-29

## 2021-04-29 RX ORDER — LIDOCAINE HYDROCHLORIDE 20 MG/ML
1 INJECTION, SOLUTION EPIDURAL; INFILTRATION; INTRACAUDAL; PERINEURAL ONCE
Status: COMPLETED | OUTPATIENT
Start: 2021-04-29 | End: 2021-04-29

## 2021-04-29 RX ORDER — MIDAZOLAM HYDROCHLORIDE 1 MG/ML
1-5 INJECTION, SOLUTION INTRAMUSCULAR; INTRAVENOUS
Status: DISCONTINUED | OUTPATIENT
Start: 2021-04-29 | End: 2021-04-29 | Stop reason: HOSPADM

## 2021-04-29 RX ORDER — LIDOCAINE HYDROCHLORIDE 20 MG/ML
1 INJECTION, SOLUTION EPIDURAL; INFILTRATION; INTRACAUDAL; PERINEURAL ONCE
Status: DISCONTINUED | OUTPATIENT
Start: 2021-04-29 | End: 2021-04-29

## 2021-04-29 RX ADMIN — MIDAZOLAM 1 MG: 1 INJECTION INTRAMUSCULAR; INTRAVENOUS at 09:09

## 2021-04-29 RX ADMIN — FENTANYL CITRATE 50 MCG: 50 INJECTION, SOLUTION INTRAMUSCULAR; INTRAVENOUS at 09:09

## 2021-04-29 RX ADMIN — IOHEXOL 1 ML: 180 INJECTION INTRAVENOUS at 09:32

## 2021-04-29 RX ADMIN — LIDOCAINE HYDROCHLORIDE 1 ML: 20 INJECTION, SOLUTION EPIDURAL; INFILTRATION; INTRACAUDAL; PERINEURAL at 09:20

## 2021-04-29 NOTE — DISCHARGE INSTRUCTIONS
Lumbar Epidural Blood Patch: What to Expect at [address] [place] [address]  During your lumbar epidural blood patch injection, your doctor injected a small amount of your own blood into the area around your spinal cord to help with your low pressure headaches. It is important to avoid activity for the next 24 hours. Avoid all strenuous activity, heavy lifting, bending, lifting, and twisting for at least two weeks, but as long as you can. Slowly ease back into your normal routine very carefully over the course of two weeks. Avoid straining. Epidural blood patches don't always work. And when they do, they can also fail after some time. But the pain relief can last for several days to a few months or longer. Drink extra fluids and caffeine. Okay to resume pain medications as needed; prefer Tylenol. This care sheet gives you a general idea about how long it will take for you to recover. But each person recovers at a different pace. Follow the steps below to feel better as quickly as possible. How can you care for yourself at home? Activity  · You may want to do less than normal for a few days. See instructions above. · You may shower after 24 hours. Do not take a bath or submerge the puncture site in water for 2 weeks. Diet  · You can eat your normal diet. Medicines  · Your doctor will tell you if and when you can restart your medicines. He or she will also give you instructions about taking any new medicines. · If you take aspirin or some other blood thinner, ask your doctor if and when to start taking it again. Make sure that you understand exactly what your doctor wants you to do. · Be safe with medicines. Read and follow all instructions on the label. ? If the doctor gave you a prescription medicine for pain, take it as prescribed. ? If you are not taking a prescription pain medicine, ask your doctor if you can take an over-the-counter medicine.   ? If you normally take a blood thinner, be sure to get instructions about when to start taking it again. Ice and elevation  · If the site of your shot feels sore or tender, put ice or a cold pack on it for 10 to 20 minutes at a time. Put a thin cloth between the ice and your skin. Follow-up care is a key part of your treatment and safety. Be sure to make and go to all appointments, and call your doctor if you are having problems. It's also a good idea to know your test results and keep a list of the medicines you take. When should you call for help? [id number] anytime you think you may need emergency care. For example, call if:  · You passed out (lost consciousness). · You have trouble breathing. Call your doctor now or seek immediate medical care if:  · You have new pain, or your pain gets worse. · You have new numbness in your buttocks or legs. · You have new weakness in your buttocks or legs. · You have a severe headache. · You have new loss of bowel or bladder control. · You have signs of infection, such as:  ? Increased pain, swelling, warmth, or redness. ? A fever. Watch closely for any changes in your health, and be sure to contact your doctor if:  · You do not get better as expected.

## 2021-04-29 NOTE — PROGRESS NOTES
TRANSFER - OUT REPORT:    Verbal report given to [address] on Pamela Merritt being transferred to [facility] for ordered procedure       Report consisted of patient's Situation, Background, Assessment and   Recommendations(SBAR). Information from the following report(s) Procedure Summary was reviewed with the receiving nurse. Opportunity for questions and clarification was provided.       Patient transported with:   Registered Nurse

## 2021-04-29 NOTE — INTERVAL H&P NOTE
Update History & Physical    The Patient's History and Physical of April 23, 2021 was reviewed with the patient and I examined the patient. There was no change. The surgical site was confirmed by the patient and me. Neuro intact. Heart RRR. Lungs CTA bilat. Plan:  The risk, benefits, expected outcome, and alternative to the recommended procedure have been discussed with the patient. Patient understands and wants to proceed with the procedure.     Electronically signed by Mary Lou Schmidt MD on 4/29/2021 at 8:43 AM

## 2021-04-29 NOTE — ROUTINE PROCESS
7: 27 AM    Patient arrived. ID and allergies verified verbally with patient. Pt voices understanding of procedure to be performed. Consent obtained. Pt prepped for procedure. 10:00 AM    TRANSFER - IN REPORT:    Verbal report received from [facility] (name) on Darylene Dura  being received from [facility] (unit) for routine post - op      Report consisted of patients Situation, Background, Assessment and   Recommendations(SBAR). Information from the following report(s) Procedure Summary was reviewed with the receiving nurse. Opportunity for questions and clarification was provided. Assessment completed upon patients arrival to unit and care assumed. 12:06 PM    Patient laid flat and prone for 2 hours post procedure per Dr Millan Section. Assisted patient to rotate over to continue laying flat supine for the next 2 hours per Dr Millan Section. Patient denies any pain or discomfort, denies numbness or tingling in extremities. Patient able to move all extremities with out assistance. 2:00 PM    Discharge instructions reviewed with patient and family. Voiced understanding. Patient given copy of discharge instructions to take home. 2:11 PM    Neuro assessment completed. Patient WNL. 2:15 PM    Pt sat on side of bed then ambulated around unit and to restroom. Voided. Returned to chair. Lumbar site dressing dry and intact. No bleeding or hematoma. Pt voices no complaints. 2:28 PM    Pt discharged via wheelchair with family. Personal belongings with patient upon discharge.

## 2021-04-29 NOTE — PROGRESS NOTES
TRANSFER - IN REPORT:    Verbal report received from Kiana on Patricia Blackwellle  being received from [facility] for ordered procedure      Report consisted of patients Situation, Background, Assessment and   Recommendations(SBAR). Information from the following report(s) SBAR was reviewed with the receiving nurse. Opportunity for questions and clarification was provided. Assessment completed upon patients arrival to unit and care assumed.

## 2021-04-29 NOTE — BRIEF OP NOTE
NEUROINTERVENTIONAL SURGERY POST-PROCEDURE NOTE    PROCEDURE:  Lumbar epidural blood patch  Lumbar epidurogram        PRELIMINARY REPORT & DISPOSITION:   L2-L3 level confirmed with radiography. Epidural location confirmed with epidurogram.    17cc autologous blood injected without complication. COMPLICATIONS:  None    FOLLOW-UP:  2 hours prone trendelenburg, then 2 hours flat supine  Follow up in clinic in 3-4 weeks DATE OF SERVICE:  4/29/2021 10:02 AM     ATTENDING SURGEON(S):  Mamadou Santos MD      ANESTHESIA:   Conscious sedation    EBL: 2 cc    MEDICATIONS:   See nursing record    PUNCTURE SITE:  L2-L3     Kay Thrasher.  Burgess Beck M.D.    Tong Delgadillo    , Department of Radiology  [facility]

## 2021-05-03 ENCOUNTER — TELEPHONE (OUTPATIENT)
Dept: NEUROSURGERY | Age: 51
End: 2021-05-03

## 2021-05-03 NOTE — TELEPHONE ENCOUNTER
FYI  Patient stated that through the access now program it may take up to 3 months before she can see an ENT.

## 2021-05-20 ENCOUNTER — TELEPHONE (OUTPATIENT)
Dept: NEUROLOGY | Age: 51
End: 2021-05-20

## 2021-05-20 NOTE — TELEPHONE ENCOUNTER
----- Message from Romi Knowles sent at 5/20/2021 12:26 PM EDT -----  Regarding: Dr Yasmin Foley first and last name:      Reason for call:pt said if Dr Shahzad De Guzman can do a referral for her to be seen for her CS[facility], that is located in [place], since [place] lives in [place], the referral has to go thru her Neurologist, in order for them to take her in as a pt they have a very long waiting list       Callback required yes/no and why:yes for reason given above       Best contact number(s):[phone] yes please call her to discuss further       Details to clarify the request:pt said the best way to send referral is to send thru My Chart or her email, since she can not drive to the office     Please do not mail it to them directly  it has to go to her first and then she will send it to [facility] in [address], for CSF leaks  do not fax the ref any contacts can go thru the CSF coordinator Clarice Leger (p) [phone] for [address] can talk with her or leave a message     Please do not mail ref to her home, at this time. ..       Romi Knowles

## 2021-05-24 ENCOUNTER — TELEPHONE (OUTPATIENT)
Dept: NEUROLOGY | Age: 51
End: 2021-05-24

## 2021-05-25 ENCOUNTER — TELEPHONE (OUTPATIENT)
Dept: NEUROSURGERY | Age: 51
End: 2021-05-25

## 2021-05-25 DIAGNOSIS — G44.59 OTHER COMPLICATED HEADACHE SYNDROME: Primary | ICD-10-CM

## 2021-05-25 NOTE — PROGRESS NOTES
Patient requested referral be sent with the word urgent otherwise patient would have to wait until July for an appointment.

## 2021-05-25 NOTE — TELEPHONE ENCOUNTER
Patient stated that she think she is having rebound headaches. When laying down the headaches are bad and when she stand up she stated that it feels like a pressure headache. Neck pain, feels like top of head feels like it is on fire and then she gets chill and feels extremely nauseated. Vision is still blurred stated she feels like she is in a boat. She stated she has been feeling this way for almost a week. Patient stated she has not taken any pain medications to try to ease the headache.

## 2021-05-26 ENCOUNTER — TELEPHONE (OUTPATIENT)
Dept: NEUROSURGERY | Age: 51
End: 2021-05-26

## 2021-05-26 ENCOUNTER — VIRTUAL VISIT (OUTPATIENT)
Dept: NEUROLOGY | Age: 51
End: 2021-05-26
Payer: MEDICAID

## 2021-05-26 DIAGNOSIS — G97.84: Primary | ICD-10-CM

## 2021-05-26 DIAGNOSIS — R51.9 HEADACHE DISORDER: ICD-10-CM

## 2021-05-26 DIAGNOSIS — G97.1 SPINAL PUNCTURE HEADACHE: ICD-10-CM

## 2021-05-26 DIAGNOSIS — M54.2 CERVICALGIA: ICD-10-CM

## 2021-05-26 DIAGNOSIS — R42 DIZZINESS: ICD-10-CM

## 2021-05-26 PROCEDURE — 99214 OFFICE O/P EST MOD 30 MIN: CPT | Performed by: PSYCHIATRY & NEUROLOGY

## 2021-05-26 RX ORDER — TOPIRAMATE 25 MG/1
25 TABLET ORAL 2 TIMES DAILY WITH MEALS
Qty: 60 TABLET | Refills: 0 | Status: CANCELLED | OUTPATIENT
Start: 2021-05-26

## 2021-05-26 NOTE — TELEPHONE ENCOUNTER
Patient called and requested to speak with me. I called her back today. After the blood patch she had improvement of her symptoms I felt approximately 60 to 70% for approximately 2 weeks. She now believes that she may be having rebound headaches. She still has discharge from her ears. I advised her to follow-up with Dr. Mary Ann Waters for further evaluation. She expressed some discontent with being able to obtain a clinic visit with him in a timely fashion. I advised her to let me know if she has any issues and we can try to help expedite or find someone who can see her sooner. I also advised her to make sure she sees ENT, as we have placed a referral.  I will be seeing patient in clinic on Friday as well.

## 2021-05-28 ENCOUNTER — VIRTUAL VISIT (OUTPATIENT)
Dept: NEUROSURGERY | Age: 51
End: 2021-05-28
Payer: MEDICAID

## 2021-05-28 DIAGNOSIS — G44.59 OTHER COMPLICATED HEADACHE SYNDROME: Primary | ICD-10-CM

## 2021-05-28 PROCEDURE — 99214 OFFICE O/P EST MOD 30 MIN: CPT | Performed by: RADIOLOGY

## 2021-05-28 NOTE — PATIENT INSTRUCTIONS
A Healthy Lifestyle: Care Instructions Your Care Instructions A healthy lifestyle can help you feel good, stay at a healthy weight, and have plenty of energy for both work and play. A healthy lifestyle is something you can share with your whole family. A healthy lifestyle also can lower your risk for serious health problems, such as high blood pressure, heart disease, and diabetes. You can follow a few steps listed below to improve your health and the health of your family. Follow-up care is a key part of your treatment and safety. Be sure to make and go to all appointments, and call your doctor if you are having problems. It's also a good idea to know your test results and keep a list of the medicines you take. How can you care for yourself at home? · Do not eat too much sugar, fat, or fast foods. You can still have dessert and treats now and then. The goal is moderation. · Start small to improve your eating habits. Pay attention to portion sizes, drink less juice and soda pop, and eat more fruits and vegetables. ? Eat a healthy amount of food. A 3-ounce serving of meat, for example, is about the size of a deck of cards. Fill the rest of your plate with vegetables and whole grains. ? Limit the amount of soda and sports drinks you have every day. Drink more water when you are thirsty. ? Eat plenty of fruits and vegetables every day. Have an apple or some carrot sticks as an afternoon snack instead of a candy bar. Try to have fruits and/or vegetables at every meal. 
· Make exercise part of your daily routine. You may want to start with simple activities, such as walking, bicycling, or slow swimming. Try to be active 30 to 60 minutes every day. You do not need to do all 30 to 60 minutes all at once. For example, you can exercise 3 times a day for 10 or 20 minutes.  Moderate exercise is safe for most people, but it is always a good idea to talk to your doctor before starting an exercise program. 
· Keep moving. Alexa Angelica the lawn, work in the garden, or AFAR. Take the stairs instead of the elevator at work. · If you smoke, quit. People who smoke have an increased risk for heart attack, stroke, cancer, and other lung illnesses. Quitting is hard, but there are ways to boost your chance of quitting tobacco for good. ? Use nicotine gum, patches, or lozenges. ? Ask your doctor about stop-smoking programs and medicines. ? Keep trying. In addition to reducing your risk of diseases in the future, you will notice some benefits soon after you stop using tobacco. If you have shortness of breath or asthma symptoms, they will likely get better within a few weeks after you quit. · Limit how much alcohol you drink. Moderate amounts of alcohol (up to 2 drinks a day for men, 1 drink a day for women) are okay. But drinking too much can lead to liver problems, high blood pressure, and other health problems. Family health If you have a family, there are many things you can do together to improve your health. · Eat meals together as a family as often as possible. · Eat healthy foods. This includes fruits, vegetables, lean meats and dairy, and whole grains. · Include your family in your fitness plan. Most people think of activities such as jogging or tennis as the way to fitness, but there are many ways you and your family can be more active. Anything that makes you breathe hard and gets your heart pumping is exercise. Here are some tips: 
? Walk to do errands or to take your child to school or the bus. 
? Go for a family bike ride after dinner instead of watching TV. Where can you learn more? Go to http://www.gray.com/ Enter P801 in the search box to learn more about \"A Healthy Lifestyle: Care Instructions. \" Current as of: September 23, 2020               Content Version: 12.8 © 7179-7491 [employer].   
Care instructions adapted under license by [employer] (which disclaims liability or warranty for this information). If you have questions about a medical condition or this instruction, always ask your healthcare professional. [address] any warranty or liability for your use of this information.

## 2021-05-28 NOTE — PROGRESS NOTES
Phone call to patient in preparation for Virtual visit with provider. Name and  verified. Patient unable to obtain vital signs at home. Patient continues to complain of severe headaches when she lays down. Reports no change in vision. No acute problems reported.

## 2021-05-29 RX ORDER — TOPIRAMATE 25 MG/1
25 TABLET ORAL
Qty: 42 TABLET | Refills: 0 | Status: SHIPPED | OUTPATIENT
Start: 2021-05-29 | End: 2021-07-05

## 2021-05-29 RX ORDER — TOPIRAMATE 25 MG/1
25 TABLET ORAL
Qty: 42 TABLET | Refills: 0 | Status: SHIPPED | OUTPATIENT
Start: 2021-05-29 | End: 2021-05-29 | Stop reason: CLARIF

## 2021-06-01 NOTE — PROGRESS NOTES
Neurology Progress Note  Niraj Stone was seen by synchronous (real-time) audio-video technology on 2021     Consent:  She  is aware that this patient-initiated Telehealth encounter is a billable service, with coverage as determined by her insurance carrier. She is aware that she may receive a bill and has provided verbal consent to proceed: Yes    I was in the office while conducting this encounter. Pursuant to the emergency declaration under the [address] [place] [address] [facility] authority and the [employer] Act, this Virtual  Visit was conducted, with patient's consent, to reduce the patient's risk of exposure to COVID-19 and provide continuity of care for an established patient. Services were provided through a video synchronous discussion virtually to substitute for in-person clinic visit. NAME:  Niraj Stone   :   1970   MRN:   [id number]     Date/Time:  2021  Subjective:   Niraj Stone is a 52 y.o. female here today for follow-up for headache, dizziness, subsequent to \[facility] [place] [facility]\"" SERVICES at the cervical region, status post blood patch. Patient says after she had a blood patch, she did well with current headache for about 2 weeks, however the headache came back nearly every day. Headache is on top of her head. Lying down patient feels better, however standing or trying to move around brings bilaterally headache. Sometimes she feels as if something is draining from her ear and she feels shaky inside  She says the headache is associated with blurry vision, nausea, dizziness and she feels metallic taste. At this time, patient is thinking of going to [facility] where they have experts in spinal fluid leak and patches. Meanwhile, I will start patient on Topamax 25 mg p.o. twice daily to see if it will help while waiting for [facility] referral.  Unfortunately, patient is allergic or did not tolerate Diamox. .  She noted that the symptoms did not improve much. She says due to the headache, she has not been able to do anything, carrying out activities of daily living has been a problem, she is being helped by her friend. She says she cannot drive because of the headache dizziness nausea when she sits now for some time. She still experiencing blurry vision, however, her memory seems to be improving. She denies loss of consciousness, dysphagia or odynophagia. .Review of Systems - General ROS: positive for  - fatigue, malaise and sleep disturbance  Psychological ROS: positive for - anxiety and sleep disturbances  Ophthalmic ROS: positive for - decreased vision, double vision and photophobia  ENT ROS: positive for - headaches, tinnitus, vertigo and visual changes  Allergy and Immunology ROS: negative  Hematological and Lymphatic ROS: negative  Endocrine ROS: negative  Respiratory ROS: no cough, shortness of breath, or wheezing  Cardiovascular ROS: no chest pain or dyspnea on exertion  Gastrointestinal ROS: no abdominal pain, change in bowel habits, or black or bloody stools  Genito-Urinary ROS: no dysuria, trouble voiding, or hematuria  Musculoskeletal ROS: positive for - joint pain, muscle pain and muscular weakness  Neurological ROS: positive for - dizziness, headaches, impaired coordination/balance, memory loss, numbness/tingling, visual changes and weakness  Dermatological ROS: negative        Medications reviewed:  Current Outpatient Medications   Medication Sig Dispense Refill    pseudoephedrine-ibuprofen (Advil Cold and Sinus)  mg tab Take  by mouth. Once daily      ibuprofen (Motrin IB) 200 mg tablet Take 200 mg by mouth. 1-2 per day      cyclobenzaprine (FLEXERIL) 10 mg tablet Take 1 Tab by mouth two (2) times a day. 60 Tab 1    albuterol (PROVENTIL HFA, VENTOLIN HFA, PROAIR HFA) 90 mcg/actuation inhaler Take  by inhalation.  topiramate (TOPAMAX) 25 mg tablet Take 1 Tablet by mouth nightly.  42 Tablet 0    acetaminophen (TYLENOL PO) Take 500 mg by mouth as needed.  OTHER Cervical blood patch-C5-C6  Indications: Spinal headache after epidural steroid injection 1 Units 0    cetirizine (ZYRTEC) 10 mg tablet Take 10 mg by mouth.  butalbital-acetaminophen-caffeine (FIORICET, ESGIC) -40 mg per tablet Take 1 tab bid for 7 days, then 1 tab prn for severe headache (Patient not taking: Reported on 5/26/2021) 30 Tab 1    ciprofloxacin HCl (CIPRO) 500 mg tablet Take 1 Tab by mouth two (2) times a day. 5 Tab 0        Objective:   Vitals: There were no vitals filed for this visit. Lab Data Reviewed:  Lab Results   Component Value Date/Time    WBC 7.7 01/06/2021 10:30 AM    HCT 43.9 01/06/2021 10:30 AM    HGB 13.8 01/06/2021 10:30 AM    PLATELET 843 45/73/2191 10:30 AM       Lab Results   Component Value Date/Time    Sodium 140 01/06/2021 10:30 AM    Potassium 3.9 01/06/2021 10:30 AM    Chloride 108 01/06/2021 10:30 AM    CO2 25 01/06/2021 10:30 AM    Glucose 88 01/06/2021 10:30 AM    BUN 17 01/06/2021 10:30 AM    Creatinine 0.90 01/06/2021 10:30 AM    Calcium 8.8 01/06/2021 10:30 AM       No components found for: TROPQUANT    No results found for: SHOLA      No results found for: HBA1C, BYB1GPXY, GOP4VNJN     No results found for: B12LT, FOL, RBCF    No results found for: SHOLA, FRANCHESCA Cabezas    Lab Results   Component Value Date/Time    Cholesterol, total 308 (H) 01/06/2021 10:30 AM    HDL Cholesterol 67 01/06/2021 10:30 AM    LDL, calculated 225.6 (H) 01/06/2021 10:30 AM    VLDL, calculated 15.4 01/06/2021 10:30 AM    Triglyceride 77 01/06/2021 10:30 AM    CHOL/HDL Ratio 4.6 01/06/2021 10:30 AM         CT Results (recent):  Results from Hospital Encounter encounter on 04/19/21    CTA HEAD NECK W CONT    Addendum 4/19/2021 12:07 PM  Addendum:  It is noted that the left vertebral artery arises strictly from the aortic arch,  a normal anatomic variant.     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 Postprocessing was performed to include MIP and reformatted  images. Standard images of the head were also obtained following contrast  administration and a delay. CT dose reduction was achieved through the use of a standardized protocol  tailored for this examination and automatic exposure control for dose  modulation. FINDINGS:    HEAD CT:  The ventricles are normal in size and midline. .  There is no intracranial  hemorrhage or evolving infarct. . There is no abnormal enhancement. . .    CTA NECK:    Great vessels: Normal arch anatomy with the origins patent. Right subclavian artery: Patent    Left subclavian artery: Patent    Right common carotid artery: Patent    Left common carotid artery: Patent    Cervical right internal carotid artery: Patent with no significant stenosis by  NASCET criteria. Cervical left internal carotid artery: Patent with no significant stenosis by  NASCET criteria. Right vertebral artery: Patent    Left vertebral artery: Patent    The lung apices are clear. The thyroid is homogeneous. No cervical  lymphadenopathy. There are degenerative changes in the spine. CTA HEAD:    Right cavernous internal carotid artery: Patent    Left cavernous internal carotid artery: Patent    Anterior cerebral arteries: Patent    Anterior communicating artery: Patent    Right middle cerebral artery: Patent    Left middle cerebral artery: Patent    Posterior cerebral arteries: Patent    Posterior communicating arteries: Patent. Small on the right. Basilar artery: Patent    Distal vertebral arteries: Bilaterally patent, with a dominant right vertebral  artery.  No dissection. No aneurysms. Normal patency of the major venous sinuses and deep venous system. Impression  1. Negative CT angiography of the neck and head. MRI Results (recent):  Results from [facility] encounter on 04/19/21    MRI LUMB SPINE W WO CONT    Narrative  EXAM: MRI LUMB SPINE W WO CONT    INDICATION: Positional headaches: rule out CSF Leak; CSF LEAK PROTOCOL. Other  complicated headache syndrome / CSF LEAK PROTOCOL    COMPARISON: None    TECHNIQUE: MR imaging of the lumbar spine was performed using the following  sequences: sagittal T1, T2, STIR;  axial T1, T2 prior to and following contrast  administration. CONTRAST: 17 mL of Dotarem. FINDINGS:    There is normal alignment of the lumbar spine. Vertebral body heights are  maintained. There are no significant marrow signal abnormalities. There is an  incidental hemangioma at L1 on the right. There are mild chronic Modic type II  discogenic changes in the endplates at Y3-P7. The conus medullaris terminates at L1. Signal and caliber of the distal spinal  cord are within normal limits. There is lipomatous infiltration of the filum,  with a thin area of fat signal intensity extending to the caudal aspect of the  thecal sac. There is no abnormal intraspinal enhancement. The paraspinal soft tissues are within normal limits. There are no perineural  cysts or fluid collections. Lower thoracic spine: No herniation or stenosis. L1-L2: No herniation or stenosis. L2-L3: No herniation or stenosis. Minimal posterior disc bulge. L3-L4: No herniation or stenosis. Minimal posterior disc bulge. L4-L5: Disc degeneration with mild loss of disc height. Mild posterior disc  bulge. No central or foraminal stenosis. L5-S1: Disc degeneration with loss of disc height. Mild right central disc  protrusion, adjacent to the exiting right S1 nerve root. Minimal ventral thecal  sac in penetration. No central spinal stenosis.  Mild bilateral foraminal  stenosis. Impression  1. No perineural cyst or abnormal fluid collection demonstrated. 2. Mild right L5-S1 posterior disc protrusion. 3. Lipomatous infiltration of the filum, likely incidental. Normal position of  the conus. IR Results (recent):  Results from [place] encounter on 04/29/21    IR INJ BLOOD PATCH    Narrative  EXAMINATION:  1. Lumbar epidurogram  2. Autologous lumbar epidural blood patch/injection  3. Fluoroscopic guidance    HISTORY:  Vaibhav Kc is a 52years old Female with postural headaches concerning for  intracranial hypotension. ATTENDING PHYSICIAN(S): Dr. Alison Golden    ANESTHESIA: Minimal sedation    MEDICATIONS: 2% lidocaine subcutaneous ; See Nursing/Anesthesia notes    CONTRAST: Omnipaque 180 0.5 mL    ESTIMATED BLOOD LOSS: 2 mL    COMPLICATIONS: None    RADIATION DOSE:  Fluoroscopy time: 2.7 minutes  Number of digital runs: 0  Dose (plane A): 71.7 mGy  Dose (plane B): 235.5 mGy    CONSENT:  Following a discussion of the risks, benefits, and alternatives to the  procedure, and after all questions about the procedure had been answered, the  patient provided informed consent. Specific risks discussed included pain,  infection, bleeding, transient or permanent neurological deficits, allergic  reaction, headaches, and rebound intracranial hypertension. PROCEDURE:  A \"time out\" was performed to confirm the patient's identity and planned  procedure. The procedure was performed using maximal sterile barrier technique,  including surgical caps and masks, sterile gowns and gloves, a large sterile  drape, hand hygiene, and 2% chlorhexidine scrub for cutaneous antisepsis. The patient was placed prone on the fluoroscopy table and then prepared and  draped in the standard sterile fashion.   Minimal sedation (intravenous fentanyl  and midazolam) was administered under the direction of the attending physician  with continuous monitoring by a trained nurse specialist independent of those  performing the procedure. Total monitored sedation time was 60 minutes. The L2-L3 level was localized with fluoroscopy. 2% lidocaine solution was used  as local anesthetic. A 20-gauge spinal needle was then placed in the posterior epidural space under  direct, biplane fluoroscopic guidance and using loss of resistance technique. 0.5 mL of Omnipaque 180 was injected to confirm epidural position. While the patient was awake, 20 mL of peripheral blood was withdrawn from an  18-gauge IV catheter in the left AC. 17 mL of this blood was slowly injected  into the spinal needle. The patient maintained normal motor function in both  feet and denied significant radicular symptoms throughout the injection. The spinal needle was removed and a sterile bandage was applied to the puncture  site. Patient was placed in prone Trendelenburg position. The patient tolerated the procedure well. There were no immediate complications. The patient was transported to the recovery area in stable neurological  condition. FINDINGS:  Fluoroscopy confirms appropriate position of the spinal needle at L2-L3 level. Lumbar epidurogram confirms needle position in the epidural space. Impression  Technically successful autologous epidural blood patch injection at L2-L3 (17  mL). PLAN:  1. Prone Trendelenburg for 2 hours, then supine for another 2 hours  2. Return to clinic with me in 3-4 weeks      VAS/US Results (recent):  Results from [place] [facility] encounter on 04/28/15    DUPLEX CAROTID BILATERAL      PHYSICAL EXAM:  General:    Alert, cooperative, no distress, appears stated age. Head:   Normocephalic, without obvious abnormality, atraumatic. Eyes:   Conjunctivae/corneas clear. Nose:  Nares normal. .  Throat:    Lips and tongue normal.    Neck:  Symmetrical,  no adenopathy, thyroid     no JVD.   Back:    Symmetric  Lungs:   Deferred  Chest wall:   No Accessory muscle use.  Heart:   Deferred. Abdomen:    Not distended. Extremities: Extremities normal, atraumatic, No cyanosis. No edema. No clubbing  Skin:      No rashes or lesions. Not Jaundiced  Lymph nodes: Cervical, supraclavicular normal.  Psych:  Good insight. Not depressed. Anxious. NEUROLOGICAL EXAM:  Appearance: The patient is well developed, well nourished, provides a coherent history and is in no acute distress. Mental Status: Oriented to time, place and person. Mood and affect appropriate. Cranial Nerves:   Intact visual fields. EOM's full, no nystagmus, no ptosis. Facial movement is symmetric. Hearing is normal bilaterally. Tongue is midline. Motor:   Moves all extremities . No fasciculations. Reflexes:   Deferred. Sensory:   Deferred. Gait:   Not assessed t. Tremor:   No tremor noted. Cerebellar:  No cerebellar signs present. Assesment  1. Intracranial hypotension following other procedure  Blood patch b    2. Spinal puncture headache  Blood patch    3. Cervicalgia  Continue management    4. Dizziness  Stable    5. Headache disorder  Topamax    ___________________________________________________  PLAN: Medication and plan discussed with patient      ICD-10-CM ICD-9-CM    1. Intracranial hypotension following other procedure  G97.84 458.8      998.89    2. Spinal puncture headache  G97.1 349.0    3. Cervicalgia  M54.2 723.1    4. Dizziness  R42 780.4    5. Headache disorder  R51.9 784.0      Follow-up and Dispositions    · Return in about 4 weeks (around 6/23/2021).                ___________________________________________________    Attending Physician: Bebe Leung MD

## 2021-06-03 ENCOUNTER — TELEPHONE (OUTPATIENT)
Dept: NEUROLOGY | Age: 51
End: 2021-06-03

## 2021-06-03 NOTE — TELEPHONE ENCOUNTER
Caller stated the Referral needs to be sent to [facility]. They gave me the fax number and person of contact. Patient would also like a copy of the referral mailed to her home address.  Below are the details:    Fax number: [phone]    Person of Contact: August Rogel RN at [facility]    Patient Address: [address].         [place], [address]

## 2021-06-08 NOTE — PROGRESS NOTES
Neurointerventional Surgery Clinic Note    Maggie Moody is a 52 y.o. female who was seen by synchronous (real-time) audio-video technology on 5/28/2021. Consent: Maggie Moody, who was seen by synchronous (real-time) audio-video technology, and/or her healthcare decision maker, is aware that this patient-initiated, Telehealth encounter on 5/28/2021 is a billable service, with coverage as determined by her insurance carrier. She is aware that she may receive a bill and has provided verbal consent to proceed: Yes. Assessment & Plan:   52 y.o. female with history of mononucleosis, palpitations, hyperlipidemia, cervical degenerative disc disease, and symptoms suspicious for intracranial hypotension who presents for clinical follow-up after single level epidural blood patch. Patient was doing fairly well after the epidural blood patch for about 2 weeks, but then had recurrence of her symptoms, which are now different in character. She believes that she may be having rebound headaches, and she is very focused on a diagnosis of pressure related headaches despite lack of definitive evidence thereof. She has headaches and numerous symptoms, which seem to be very disabling. She was seen by Dr. Tayo Christina on 5/26/2021 and prescribed Topamax. She plans to initiate care at [facility] for further evaluation of possible CSF leak. Although she has persistent intermittent ear drainage, she has still yet to see ENT. Unfortunately, there is not much value that I am providing to this patient's care at this time. I advised her to follow-up with Dr. Tayo Christina for management of her symptoms and referral to [facility]. I also advised her follow-up with ophthalmology and ENT, as previously recommended. I also explained to her that she should be careful with focusing so intently on a pressure related diagnosis for her symptoms. Although this may indeed be the case, other entities could also cause her symptoms.   Until there is a definitive diagnosis by lumbar puncture or imaging, she should be mindful of the possibility so that she is not disappointed. No further neurointerventional surgery follow-up is necessary at this time. However, I explained to the patient that I am here if she needs me or has any questions, and that I would be willing to see her anytime as needed. Plan:  -Patient to follow-up with Dr. Candelaria Roe, and follow through with ophthalmology and ENT referrals  -No NIS follow up indicated at this time    I spent at least 30 minutes on this visit with this established patient. Subjective:   Deidra Mccarty is a 52 y.o. female   with history of mononucleosis, palpitations, hyperlipidemia, cervical degenerative disc disease, and symptoms suspicious for intracranial hypotension who presents for clinical follow-up after single level epidural blood patch. Patient states that after the epidural blood patch, she had improvement of her symptoms and she felt approximately 60 to 70% for about 2 weeks. Then, she reports feeling a sudden onset of return of her headaches while eating a meal.  She now believes that she may be having rebound headaches, and she is very focused on a diagnosis of pressure related headaches despite lack of definitive evidence thereof. She reports feeling shaky inside and severe nausea. Ginger tea helps with the nausea. She also has headaches that occur at the back of the head as well as neck stiffness and soreness which occur when standing. When she is sitting, the headache is frontal, behind the eyes. The headaches are more intense when sitting and lying down. On average, the headaches are 7-8 out of 10 in severity. She reports left neck soreness. She still has intermittent ear drainage. She has not yet seen ENT. She also reports that the top of her head feels like it is \"on fire\" or \"freezing/burning,\" with pressure on the nose and sinuses.   She also reports a \"metallic drainage\" in the nose and throat, after which she experiences chills. She denies numbness and tingling. She reports subjective right arm weakness with sharp pain between the right shoulder blade and spine. She also reports dizziness and blurred vision, as well as intermittent tailbone pain. Since our last phone call on 5/26/2021, patient was able to see Dr. Dionne Stevens via virtual visit on that same day. She was prescribed Topamax. She plans to initiate care at [facility] for further evaluation of possible CSF leak. Chief Complaint: Headache (Follow up)      Prior to Admission medications    Medication Sig Start Date End Date Taking? Authorizing Provider   acetaminophen (TYLENOL PO) Take 500 mg by mouth as needed. Yes Provider, Historical   pseudoephedrine-ibuprofen (Advil Cold and Sinus)  mg tab Take  by mouth. Once daily   Yes Provider, Historical   ibuprofen (Motrin IB) 200 mg tablet Take 200 mg by mouth. 1-2 per day   Yes Provider, Historical   cetirizine (ZYRTEC) 10 mg tablet Take 10 mg by mouth. Yes Provider, Historical   cyclobenzaprine (FLEXERIL) 10 mg tablet Take 1 Tab by mouth two (2) times a day. 3/11/21  Yes David Arredondo MD   albuterol (PROVENTIL HFA, VENTOLIN HFA, PROAIR HFA) 90 mcg/actuation inhaler Take  by inhalation. Yes Provider, Historical   topiramate (TOPAMAX) 25 mg tablet Take 1 Tablet by mouth nightly. 5/29/21   David Arredondo MD   OTHER Cervical blood patch-C5-C6  Indications: Spinal headache after epidural steroid injection 4/6/21   David Arredondo MD   butalbital-acetaminophen-caffeine (FIORICET, ESGIC) -40 mg per tablet Take 1 tab bid for 7 days, then 1 tab prn for severe headache  Patient not taking: Reported on 5/26/2021 3/11/21   David Arredondo MD   ciprofloxacin HCl (CIPRO) 500 mg tablet Take 1 Tab by mouth two (2) times a day.  7/2/20   LIUDMILA Jackson     Allergies   Allergen Reactions    Diamox Sequels [Acetazolamide] Rash    Sulfa (Sulfonamide Antibiotics) Swelling Patient Active Problem List   Diagnosis Code    Other complicated headache syndrome G44.59     Patient Active Problem List    Diagnosis Date Noted    Other complicated headache syndrome 04/15/2021     Current Outpatient Medications   Medication Sig Dispense Refill    acetaminophen (TYLENOL PO) Take 500 mg by mouth as needed.  pseudoephedrine-ibuprofen (Advil Cold and Sinus)  mg tab Take  by mouth. Once daily      ibuprofen (Motrin IB) 200 mg tablet Take 200 mg by mouth. 1-2 per day      cetirizine (ZYRTEC) 10 mg tablet Take 10 mg by mouth.  cyclobenzaprine (FLEXERIL) 10 mg tablet Take 1 Tab by mouth two (2) times a day. 60 Tab 1    albuterol (PROVENTIL HFA, VENTOLIN HFA, PROAIR HFA) 90 mcg/actuation inhaler Take  by inhalation.  topiramate (TOPAMAX) 25 mg tablet Take 1 Tablet by mouth nightly. 42 Tablet 0    OTHER Cervical blood patch-C5-C6  Indications: Spinal headache after epidural steroid injection 1 Units 0    butalbital-acetaminophen-caffeine (FIORICET, ESGIC) -40 mg per tablet Take 1 tab bid for 7 days, then 1 tab prn for severe headache (Patient not taking: Reported on 5/26/2021) 30 Tab 1    ciprofloxacin HCl (CIPRO) 500 mg tablet Take 1 Tab by mouth two (2) times a day.  5 Tab 0     Allergies   Allergen Reactions    Diamox Sequels [Acetazolamide] Rash    Sulfa (Sulfonamide Antibiotics) Swelling     Past Medical History:   Diagnosis Date    Herniated cervical disc 2014    c4, c6    High cholesterol     Mononucleosis     Palpitations 2016     Past Surgical History:   Procedure Laterality Date    HX HEENT      nose    IR INJ BLOOD PATCH  4/29/2021     Family History   Problem Relation Age of Onset    Cancer Mother     Heart Disease Father     Breast Cancer Sister 52    Cancer Sister     Breast Cancer Maternal Aunt     Heart Disease Brother      Social History     Tobacco Use    Smoking status: Former Smoker     Quit date: 3/7/2015     Years since quittin.2    Smokeless tobacco: Former User     Quit date: 2015   Substance Use Topics    Alcohol use: Yes     Alcohol/week: 14.0 standard drinks     Types: 14 Cans of beer per week       ROS: Pertinent ROS included in HPI    Objective:   Vital Signs: (As obtained by patient/caregiver at home)  There were no vitals taken for this visit. Constitutional: [x] Appears well-developed and well-nourished [x] No apparent distress      [] Abnormal -     Mental status: [x] Alert and awake  [x] Oriented to person/place/time [x] Able to follow commands    [] Abnormal -     Eyes:   EOM    [x]  Normal    [] Abnormal -   Sclera  [x]  Normal    [] Abnormal -          Discharge [x]  None visible   [] Abnormal -     HENT: [x] Normocephalic, atraumatic  [] Abnormal -   [x] Mouth/Throat: Mucous membranes are moist    External Ears [x] Normal  [] Abnormal -    Neck: [x] No visualized mass [] Abnormal -     Pulmonary/Chest: [x] Respiratory effort normal   [x] No visualized signs of difficulty breathing or respiratory distress        [] Abnormal -      Musculoskeletal:   [x] Normal gait with no signs of ataxia         [x] Normal range of motion of neck        [] Abnormal -     Neurological:        [x] No Facial Asymmetry (Cranial nerve 7 motor function) (limited exam due to video visit)          [x] No gaze palsy        [] Abnormal -          Skin:        [x] No significant exanthematous lesions or discoloration noted on facial skin         [] Abnormal -            Psychiatric:       [x] Normal Affect [] Abnormal -        [x] No Hallucinations    Other pertinent observable physical exam findings:-      We discussed the expected course, resolution and complications of the diagnosis(es) in detail. Medication risks, benefits, costs, interactions, and alternatives were discussed as indicated. I advised her to contact the office if her condition worsens, changes or fails to improve as anticipated.  She expressed understanding with the diagnosis(es) and plan. Jessica Delaney is a 52 y.o. female who was evaluated by a video visit encounter for concerns as above. Patient identification was verified prior to start of the visit. A caregiver was present when appropriate. Due to this being a TeleHealth encounter (During QYQBD-35 public health emergency), evaluation of the following organ systems was limited: Vitals/Constitutional/EENT/Resp/CV/GI//MS/Neuro/Skin/Heme-Lymph-Imm. Pursuant to the emergency declaration under the [facility] waiver authority and the [employer] Act, this Virtual  Visit was conducted, with patient's (and/or legal guardian's) consent, to reduce the patient's risk of exposure to COVID-19 and provide necessary medical care. Services were provided through a video synchronous discussion virtually to substitute for in-person clinic visit. Patient was located at home, and provider was located in office. This visit was completed virtually using Doxy. beltran Moreno MD

## 2021-06-09 ENCOUNTER — TELEPHONE (OUTPATIENT)
Dept: NEUROLOGY | Age: 51
End: 2021-06-09

## 2021-06-30 ENCOUNTER — DOCUMENTATION ONLY (OUTPATIENT)
Dept: NEUROLOGY | Age: 51
End: 2021-06-30

## 2021-07-05 RX ORDER — TOPIRAMATE 25 MG/1
TABLET ORAL
Qty: 42 TABLET | Refills: 0 | Status: ON HOLD | OUTPATIENT
Start: 2021-07-05 | End: 2022-01-31

## 2021-07-26 ENCOUNTER — TELEPHONE (OUTPATIENT)
Dept: FAMILY PLANNING/WOMEN'S HEALTH CLINIC | Age: 51
End: 2021-07-26

## 2021-07-26 NOTE — TELEPHONE ENCOUNTER
Patient called to reschedule her Augusts 4, 2021 [facility] appt. Patient tells me that now she has medicaid. Discussed that because she now has insurance we will not be able to see her through our program. Instructed patient to call her insurance company to make sure they cover the annual mammogram, if for some reason they do not cover for her mammogram. Patient will give us a call. For now the august 4 appt. Has been cancel. Patient aware and agrees with plan.  Dominick Rivas RN

## 2021-08-13 ENCOUNTER — TRANSCRIBE ORDER (OUTPATIENT)
Dept: REGISTRATION | Age: 51
End: 2021-08-13

## 2021-08-13 ENCOUNTER — HOSPITAL ENCOUNTER (OUTPATIENT)
Dept: PREADMISSION TESTING | Age: 51
Discharge: HOME | End: 2021-08-13
Payer: MEDICAID

## 2021-08-13 DIAGNOSIS — Z01.812 PRE-PROCEDURE LAB EXAM: Primary | ICD-10-CM

## 2021-08-13 DIAGNOSIS — Z01.812 PRE-PROCEDURE LAB EXAM: ICD-10-CM

## 2021-08-13 PROCEDURE — U0003 INFECTIOUS AGENT DETECTION BY NUCLEIC ACID (DNA OR RNA); SEVERE ACUTE RESPIRATORY SYNDROME CORONAVIRUS 2 (SARS-COV-2) (CORONAVIRUS DISEASE [COVID-19]), AMPLIFIED PROBE TECHNIQUE, MAKING USE OF HIGH THROUGHPUT TECHNOLOGIES AS DESCRIBED BY CMS-2020-01-R: HCPCS

## 2021-08-14 LAB
SARS-COV-2, XPLCVT: NOT DETECTED
SOURCE, COVRS: NORMAL

## 2021-08-18 ENCOUNTER — HOSPITAL ENCOUNTER (OUTPATIENT)
Age: 51
Setting detail: OUTPATIENT SURGERY
Discharge: HOME | End: 2021-08-18
Attending: INTERNAL MEDICINE | Admitting: INTERNAL MEDICINE
Payer: MEDICAID

## 2021-08-18 ENCOUNTER — ANESTHESIA (OUTPATIENT)
Dept: ENDOSCOPY | Age: 51
End: 2021-08-18
Payer: MEDICAID

## 2021-08-18 ENCOUNTER — ANESTHESIA EVENT (OUTPATIENT)
Dept: ENDOSCOPY | Age: 51
End: 2021-08-18
Payer: MEDICAID

## 2021-08-18 VITALS
BODY MASS INDEX: 30.53 KG/M2 | TEMPERATURE: 99.6 F | HEART RATE: 82 BPM | OXYGEN SATURATION: 98 % | SYSTOLIC BLOOD PRESSURE: 115 MMHG | RESPIRATION RATE: 14 BRPM | HEIGHT: 66 IN | WEIGHT: 190 LBS | DIASTOLIC BLOOD PRESSURE: 94 MMHG

## 2021-08-18 LAB — HCG UR QL: NEGATIVE

## 2021-08-18 PROCEDURE — 76040000007: Performed by: INTERNAL MEDICINE

## 2021-08-18 PROCEDURE — 74011000250 HC RX REV CODE- 250: Performed by: NURSE ANESTHETIST, CERTIFIED REGISTERED

## 2021-08-18 PROCEDURE — 74011250636 HC RX REV CODE- 250/636: Performed by: NURSE ANESTHETIST, CERTIFIED REGISTERED

## 2021-08-18 PROCEDURE — 76060000032 HC ANESTHESIA 0.5 TO 1 HR: Performed by: INTERNAL MEDICINE

## 2021-08-18 PROCEDURE — 88305 TISSUE EXAM BY PATHOLOGIST: CPT

## 2021-08-18 PROCEDURE — 77030021593 HC FCPS BIOP ENDOSC BSC -A: Performed by: INTERNAL MEDICINE

## 2021-08-18 PROCEDURE — 81025 URINE PREGNANCY TEST: CPT

## 2021-08-18 PROCEDURE — 2709999900 HC NON-CHARGEABLE SUPPLY: Performed by: INTERNAL MEDICINE

## 2021-08-18 RX ORDER — FLUMAZENIL 0.1 MG/ML
0.2 INJECTION INTRAVENOUS
Status: DISCONTINUED | OUTPATIENT
Start: 2021-08-18 | End: 2021-08-18 | Stop reason: HOSPADM

## 2021-08-18 RX ORDER — EPINEPHRINE 0.1 MG/ML
1 INJECTION INTRACARDIAC; INTRAVENOUS
Status: DISCONTINUED | OUTPATIENT
Start: 2021-08-18 | End: 2021-08-18 | Stop reason: HOSPADM

## 2021-08-18 RX ORDER — NALOXONE HYDROCHLORIDE 0.4 MG/ML
0.4 INJECTION, SOLUTION INTRAMUSCULAR; INTRAVENOUS; SUBCUTANEOUS
Status: DISCONTINUED | OUTPATIENT
Start: 2021-08-18 | End: 2021-08-18 | Stop reason: HOSPADM

## 2021-08-18 RX ORDER — SODIUM CHLORIDE 0.9 % (FLUSH) 0.9 %
5-40 SYRINGE (ML) INJECTION EVERY 8 HOURS
Status: DISCONTINUED | OUTPATIENT
Start: 2021-08-18 | End: 2021-08-18 | Stop reason: HOSPADM

## 2021-08-18 RX ORDER — SODIUM CHLORIDE 0.9 % (FLUSH) 0.9 %
5-40 SYRINGE (ML) INJECTION AS NEEDED
Status: DISCONTINUED | OUTPATIENT
Start: 2021-08-18 | End: 2021-08-18 | Stop reason: HOSPADM

## 2021-08-18 RX ORDER — DEXTROMETHORPHAN/PSEUDOEPHED 2.5-7.5/.8
1.2 DROPS ORAL
Status: DISCONTINUED | OUTPATIENT
Start: 2021-08-18 | End: 2021-08-18 | Stop reason: HOSPADM

## 2021-08-18 RX ORDER — SODIUM CHLORIDE 9 MG/ML
50 INJECTION, SOLUTION INTRAVENOUS CONTINUOUS
Status: DISCONTINUED | OUTPATIENT
Start: 2021-08-18 | End: 2021-08-18 | Stop reason: HOSPADM

## 2021-08-18 RX ORDER — PROPOFOL 10 MG/ML
INJECTION, EMULSION INTRAVENOUS AS NEEDED
Status: DISCONTINUED | OUTPATIENT
Start: 2021-08-18 | End: 2021-08-18 | Stop reason: HOSPADM

## 2021-08-18 RX ORDER — SODIUM CHLORIDE 9 MG/ML
INJECTION, SOLUTION INTRAVENOUS
Status: DISCONTINUED | OUTPATIENT
Start: 2021-08-18 | End: 2021-08-18 | Stop reason: HOSPADM

## 2021-08-18 RX ORDER — LIDOCAINE HYDROCHLORIDE 20 MG/ML
INJECTION, SOLUTION EPIDURAL; INFILTRATION; INTRACAUDAL; PERINEURAL AS NEEDED
Status: DISCONTINUED | OUTPATIENT
Start: 2021-08-18 | End: 2021-08-18 | Stop reason: HOSPADM

## 2021-08-18 RX ORDER — ATROPINE SULFATE 0.1 MG/ML
0.5 INJECTION INTRAVENOUS
Status: DISCONTINUED | OUTPATIENT
Start: 2021-08-18 | End: 2021-08-18 | Stop reason: HOSPADM

## 2021-08-18 RX ORDER — GLYCOPYRROLATE 0.2 MG/ML
INJECTION INTRAMUSCULAR; INTRAVENOUS AS NEEDED
Status: DISCONTINUED | OUTPATIENT
Start: 2021-08-18 | End: 2021-08-18 | Stop reason: HOSPADM

## 2021-08-18 RX ADMIN — PROPOFOL 25 MG: 10 INJECTION, EMULSION INTRAVENOUS at 09:41

## 2021-08-18 RX ADMIN — PROPOFOL 50 MG: 10 INJECTION, EMULSION INTRAVENOUS at 09:38

## 2021-08-18 RX ADMIN — PROPOFOL 25 MG: 10 INJECTION, EMULSION INTRAVENOUS at 09:44

## 2021-08-18 RX ADMIN — PROPOFOL 100 MG: 10 INJECTION, EMULSION INTRAVENOUS at 09:34

## 2021-08-18 RX ADMIN — PROPOFOL 25 MG: 10 INJECTION, EMULSION INTRAVENOUS at 10:03

## 2021-08-18 RX ADMIN — PROPOFOL 25 MG: 10 INJECTION, EMULSION INTRAVENOUS at 09:52

## 2021-08-18 RX ADMIN — PROPOFOL 25 MG: 10 INJECTION, EMULSION INTRAVENOUS at 09:55

## 2021-08-18 RX ADMIN — SODIUM CHLORIDE: 900 INJECTION, SOLUTION INTRAVENOUS at 09:28

## 2021-08-18 RX ADMIN — PROPOFOL 25 MG: 10 INJECTION, EMULSION INTRAVENOUS at 09:58

## 2021-08-18 RX ADMIN — PROPOFOL 25 MG: 10 INJECTION, EMULSION INTRAVENOUS at 09:45

## 2021-08-18 RX ADMIN — LIDOCAINE HYDROCHLORIDE 80 MG: 20 INJECTION, SOLUTION EPIDURAL; INFILTRATION; INTRACAUDAL; PERINEURAL at 09:34

## 2021-08-18 RX ADMIN — PROPOFOL 25 MG: 10 INJECTION, EMULSION INTRAVENOUS at 09:50

## 2021-08-18 RX ADMIN — PROPOFOL 50 MG: 10 INJECTION, EMULSION INTRAVENOUS at 09:36

## 2021-08-18 RX ADMIN — GLYCOPYRROLATE 0.2 MG: 0.2 INJECTION, SOLUTION INTRAMUSCULAR; INTRAVENOUS at 09:48

## 2021-08-18 RX ADMIN — PROPOFOL 25 MG: 10 INJECTION, EMULSION INTRAVENOUS at 09:47

## 2021-08-18 RX ADMIN — PROPOFOL 25 MG: 10 INJECTION, EMULSION INTRAVENOUS at 10:00

## 2021-08-18 NOTE — ROUTINE PROCESS
Ana Vela  1970  [id number]    Situation:  Verbal report received from: Stephanie  Procedure: Procedure(s):  COLONOSCOPY/EGD  ESOPHAGOGASTRODUODENOSCOPY (EGD)  COLON BIOPSY    Background:    Preoperative diagnosis: ABDOMINAL DISCOMFORT/HEMATOCHEZIA  Postoperative diagnosis: 2 cm hiatal hernia, ulcerative protitis    :  Dr. Elisha Benoit): Endoscopy Technician-1: Ana Walls  Endoscopy RN-1: Marisa Ellison RN    Specimens:   ID Type Source Tests Collected by Time Destination   1 : random right colon bx Preservative   Scar Rojo MD 8/18/2021 6385 Pathology   2 : random left colon bx Preservative   Scar Rojo MD 8/18/2021 2185 Pathology   3 : targeted rectal imflammation bx Preservative Rectum  Scar Rojo MD 8/18/2021 1000 Pathology       Assessment:  Intra-procedure medications     Anesthesia gave intra-procedure sedation and medications, see anesthesia flow sheet     Intravenous fluids: NS@ KVO     Vital signs stable    Abdominal assessment: round and soft     Recommendation:  Discharge patient per MD order  Family -yes  Permission to share finding with family  -yes

## 2021-08-18 NOTE — H&P
Pre-Endoscopy H&P Update  Chief complaint/HPI/ROS:  The indication for the procedure, the patient's history and the patient's current medications are reviewed prior to the procedure and that data is reported on the H&P to which this document is attached. Any significant complaints with regard to organ systems will be noted, and if not mentioned then a review of systems is not contributory. Past Medical History:   Diagnosis Date    Arthritis     joints, back,neck    Herniated cervical disc     c4, c6    High cholesterol     Ill-defined condition 2021    spinal fluid leak s/p pain injection    Mononucleosis     Palpitations       Past Surgical History:   Procedure Laterality Date    HX HEENT  1998    nose    HX OTHER SURGICAL  2021    blood patch lumbar    IR INJ BLOOD PATCH  2021     Social   Social History     Tobacco Use    Smoking status: Former Smoker     Quit date: 3/7/2015     Years since quittin.4    Smokeless tobacco: Former User     Quit date: 2015   Substance Use Topics    Alcohol use: Yes     Alcohol/week: 14.0 standard drinks     Types: 14 Cans of beer per week      Family History   Problem Relation Age of Onset    Cancer Mother     Heart Disease Father     Breast Cancer Sister 52    Cancer Sister     Breast Cancer Maternal Aunt     Heart Disease Brother       Allergies   Allergen Reactions    Diamox Sequels [Acetazolamide] Rash    Sulfa (Sulfonamide Antibiotics) Swelling      Prior to Admission Medications   Prescriptions Last Dose Informant Patient Reported? Taking? OTHER   No No   Sig: Cervical blood patch-C5-C6  Indications: Spinal headache after epidural steroid injection   acetaminophen (TYLENOL PO) 2021 at Unknown time  Yes Yes   Sig: Take 500 mg by mouth as needed. albuterol (PROVENTIL HFA, VENTOLIN HFA, PROAIR HFA) 90 mcg/actuation inhaler 2021  Yes No   Sig: Take  by inhalation.    cetirizine (ZYRTEC) 10 mg tablet 2021  Yes No   Sig: Take 10 mg by mouth. cyclobenzaprine (FLEXERIL) 10 mg tablet 8/16/2021  No No   Sig: Take 1 Tab by mouth two (2) times a day. ibuprofen (Motrin IB) 200 mg tablet 7/18/2021 at Unknown time  Yes Yes   Sig: Take 200 mg by mouth. 1-2 per day   pseudoephedrine-ibuprofen (Advil Cold and Sinus)  mg tab 8/16/2021  Yes No   Sig: Take  by mouth. Once daily   topiramate (TOPAMAX) 25 mg tablet Not Taking at Unknown time  No No   Sig: TAKE 1 TABLET BY MOUTH EVERY DAY AT NIGHT   Patient not taking: Reported on 8/18/2021      Facility-Administered Medications: None       PHYSICAL EXAM:  The patient is examined immediately prior to the procedure. Visit Vitals  Breastfeeding No     Gen: Appears comfortable, no distress. Pulm: comfortable respirations with no abnormal audible breath sounds  CV: heart regular, well perfused  GI: abdomen flat. PLAN:  Informed consent discussion held, patient afforded an opportunity to ask questions and all questions answered. After being advised of the risks, benefits, and alternatives, the patient requested that we proceed and indicated so on a written consent form. Will proceed with procedure as planned.   Sheyla Berger MD

## 2021-08-18 NOTE — DISCHARGE INSTRUCTIONS
[address] [place] Tanya Atwood MD  [phone]      August 18, 2021    Destinee Quintero  YOB: 1970    COLONOSCOPY DISCHARGE INSTRUCTIONS    If there is redness at IV site you should apply warm compress to area. If redness or soreness persist contact Dr. Tanya Atwood or your primary care doctor. There may be a slight amount of blood passed from the rectum. Gaseous discomfort may develop, but walking, belching will help relieve this. You may not operate a vehicle for 12 hours  You may not operate machinery or dangerous appliances for rest of today  You may not drink alcoholic beverages for 12 hours  Avoid making any critical decisions for 24 hours    DIET:  You may resume your normal diet, but some patients find that heavy or large meals may lead to indigestion or vomiting. I suggest a light meal as first food intake. MEDICATIONS:  The use of some over-the-counter pain medication may lead to bleeding after colon biopsies or polyp removal.  Tylenol (also called acetaminophen) is safe to take even if you have had colonoscopy with polyp removal.  Based on the procedure you had today you may safely take aspirin or aspirin-like products for the next ten (10) days. Remember that Tylenol (also called acetaminophen) is safe to take after colonoscopy even if you have had biopsies or polyps removed. ACTIVITY:  You may resume your normal household activities, but it is recommended that you spend the remainder of the day resting -  avoid any strenuous activity. CALL DR. DICKENS'S OFFICE IF:  Increasing pain, nausea, vomiting  Abdominal distension (swelling)  Significant new or increased bleeding (oral or rectal)  Fever/Chills  Chest pain/shortness of breath                       Additional instructions: You likely have ulcerative colitis, limited to the rectum only. No cancer or polyps.   I will send a prescription for Ingrid Stephenson and CANASA to your [facility] pharmacy. This should cause the inflammation to go into remission. Take these medications as instructed and we will follow up your progress in 6 weeks. It was an honor to be your doctor today. Please let me or my office staff know if you have any feedback about today's procedure. Aroldo Alvarez MD, August 18, 2021    Colonoscopy saves lives, and can prevent colon cancer. Everyone aged 48 or older needs colonoscopy.   Tell your family and friends: get the test!

## 2021-08-18 NOTE — PROCEDURES
[address]  [address], [address]  [phone]                           Colonoscopy and EGD Procedure Note      Indications:  abdominal pain, hematochezia     :  Isatu Rooney MD    Staff: Endoscopy Technician-1: Jason Patton  Endoscopy RN-1: Rekha Choi RN    Referring Provider: Monae Tyson MD    Sedation:  MAC    Procedure Details:  After informed consent was obtained with all risks and benefits of procedure explained and pre-operative exam completed, pt was placed in the left lateral decubitus position. Following sequential administration of sedation as per above, the gastroscope was inserted into the mouth and advanced under direct vision to second portion of the duodenum. A careful inspection was made as the gastroscope was withdrawn, including a retroflexed view of the proximal stomach; findings and interventions are described below. EGD Findings:  Esophagus: 2 cm hiatal hernia, no intra-hernia erosions or ulcers  Stomach: normal; no ulcers, polyps, masses  Duodenum/jejunum:normal    EGD Interventions:  none    The bed was then turned and upon sequential sedation as per above, a digital rectal exam was performed per below. The Olympus videocolonoscope was inserted in the rectum and carefully advanced to the terminal ileum. The quality of preparation was excellent. [place] Bowel Prep Score  3/3 /3 / . The colonoscope was slowly withdrawn with careful evaluation between folds. Retroflexion in the rectum was performed.      Colon Findings:   Rectum: circumferential, moderate (Barrett II) inflammation involving the distal 10 cm of the rectum  Sigmoid: normal  Descending Colon: normal   Transverse Colon: normal  Ascending Colon: normal  Cecum: normal  Terminal Ileum: normal; no ulcers or bleeding    Colonoscopy Interventions:  biopsy of colon, randomly in right segment, then left segment, then targeted biopsies of rectal inflammation Specimens Removed:    ID Type Source Tests Collected by Time Destination   1 : random right colon bx Preservative   Dimitri Salinas MD 8/18/2021 6717 Pathology   2 : random left colon bx Preservative   Dimitri Salinas MD 8/18/2021 6637 Pathology   3 : targeted rectal imflammation bx Preservative Rectum  Dimitri Salinas MD 8/18/2021 1000 Pathology       Complications: None. EBL:  none    Impression:    See Postoperative diagnosis above    Recommendations:   - If biopsies were obtained, await pathology. You should receive a letter within 2 weeks. - Start PO 5-ASA indefinitely and rectal 5-ASA suppositories or enemas for 1 month  - Resume previous medications. - Resume previous diet. - Will arrange for clinic follow up in 6 weeks. Discharge Disposition:  Home in the company of a  when able to ambulate.     Roderick Vernon MD  8/18/2021  10:13 AM

## 2021-08-18 NOTE — ANESTHESIA POSTPROCEDURE EVALUATION
Procedure(s):  COLONOSCOPY/EGD  ESOPHAGOGASTRODUODENOSCOPY (EGD)  COLON BIOPSY. MAC    Anesthesia Post Evaluation      Multimodal analgesia: multimodal analgesia used between 6 hours prior to anesthesia start to PACU discharge  Patient location during evaluation: bedside  Patient participation: complete - patient participated  Level of consciousness: awake  Pain score: 0  Pain management: satisfactory to patient  Airway patency: patent  Anesthetic complications: no  Cardiovascular status: acceptable and blood pressure returned to baseline  Respiratory status: acceptable  Hydration status: acceptable  Comments: I have evaluated the patient and meets criteria for discharge from PACU. Zee Gibson, DO. Post anesthesia nausea and vomiting:  none  Final Post Anesthesia Temperature Assessment:  Normothermia (36.0-37.5 degrees C)      INITIAL Post-op Vital signs:   Vitals Value Taken Time   /94 08/18/21 1030   Temp 37.6 °C (99.6 °F) 08/18/21 1010   Pulse 83 08/18/21 1035   Resp 18 08/18/21 1035   SpO2 97 % 08/18/21 1035   Vitals shown include unvalidated device data.

## 2021-08-18 NOTE — PERIOP NOTES
.Patient has been evaluated by anesthesia pre-procedure. Patient alert and oriented. Pt's friend, Harris Egan, present for assessment. Vital signs will not be charted by the Endoscopy nurse. All vitals, airway, and loc are monitored by anesthesia staff throughout procedure.        Report rec'd from [facility]    .Phillip Pickens will be pre-cleaned at bedside immediately following procedure by CT

## 2021-08-19 RX ORDER — CYCLOBENZAPRINE HCL 10 MG
TABLET ORAL
Qty: 60 TABLET | Refills: 1 | Status: SHIPPED | OUTPATIENT
Start: 2021-08-19 | End: 2022-02-01 | Stop reason: SDUPTHER

## 2022-01-31 ENCOUNTER — ANESTHESIA EVENT (OUTPATIENT)
Dept: ENDOSCOPY | Age: 52
End: 2022-01-31
Payer: MEDICAID

## 2022-01-31 ENCOUNTER — HOSPITAL ENCOUNTER (OUTPATIENT)
Age: 52
Setting detail: OUTPATIENT SURGERY
Discharge: HOME | End: 2022-01-31
Attending: INTERNAL MEDICINE | Admitting: INTERNAL MEDICINE
Payer: MEDICAID

## 2022-01-31 ENCOUNTER — ANESTHESIA (OUTPATIENT)
Dept: ENDOSCOPY | Age: 52
End: 2022-01-31
Payer: MEDICAID

## 2022-01-31 VITALS
SYSTOLIC BLOOD PRESSURE: 156 MMHG | RESPIRATION RATE: 14 BRPM | HEIGHT: 66 IN | BODY MASS INDEX: 31.34 KG/M2 | TEMPERATURE: 98.4 F | WEIGHT: 195 LBS | DIASTOLIC BLOOD PRESSURE: 89 MMHG | HEART RATE: 72 BPM | OXYGEN SATURATION: 98 %

## 2022-01-31 PROCEDURE — 88305 TISSUE EXAM BY PATHOLOGIST: CPT

## 2022-01-31 PROCEDURE — 77030021593 HC FCPS BIOP ENDOSC BSC -A: Performed by: INTERNAL MEDICINE

## 2022-01-31 PROCEDURE — 76040000019: Performed by: INTERNAL MEDICINE

## 2022-01-31 PROCEDURE — 74011250636 HC RX REV CODE- 250/636: Performed by: NURSE ANESTHETIST, CERTIFIED REGISTERED

## 2022-01-31 PROCEDURE — 74011000250 HC RX REV CODE- 250: Performed by: NURSE ANESTHETIST, CERTIFIED REGISTERED

## 2022-01-31 PROCEDURE — 76060000031 HC ANESTHESIA FIRST 0.5 HR: Performed by: INTERNAL MEDICINE

## 2022-01-31 RX ORDER — EPINEPHRINE 0.1 MG/ML
1 INJECTION INTRACARDIAC; INTRAVENOUS
Status: DISCONTINUED | OUTPATIENT
Start: 2022-01-31 | End: 2022-01-31 | Stop reason: HOSPADM

## 2022-01-31 RX ORDER — PROPOFOL 10 MG/ML
INJECTION, EMULSION INTRAVENOUS AS NEEDED
Status: DISCONTINUED | OUTPATIENT
Start: 2022-01-31 | End: 2022-01-31 | Stop reason: HOSPADM

## 2022-01-31 RX ORDER — SODIUM CHLORIDE 0.9 % (FLUSH) 0.9 %
5-40 SYRINGE (ML) INJECTION AS NEEDED
Status: DISCONTINUED | OUTPATIENT
Start: 2022-01-31 | End: 2022-01-31 | Stop reason: HOSPADM

## 2022-01-31 RX ORDER — DEXTROMETHORPHAN/PSEUDOEPHED 2.5-7.5/.8
1.2 DROPS ORAL
Status: DISCONTINUED | OUTPATIENT
Start: 2022-01-31 | End: 2022-01-31 | Stop reason: HOSPADM

## 2022-01-31 RX ORDER — SODIUM CHLORIDE 9 MG/ML
50 INJECTION, SOLUTION INTRAVENOUS CONTINUOUS
Status: DISCONTINUED | OUTPATIENT
Start: 2022-01-31 | End: 2022-01-31 | Stop reason: HOSPADM

## 2022-01-31 RX ORDER — NALOXONE HYDROCHLORIDE 0.4 MG/ML
0.4 INJECTION, SOLUTION INTRAMUSCULAR; INTRAVENOUS; SUBCUTANEOUS
Status: DISCONTINUED | OUTPATIENT
Start: 2022-01-31 | End: 2022-01-31 | Stop reason: HOSPADM

## 2022-01-31 RX ORDER — LIDOCAINE HYDROCHLORIDE 20 MG/ML
INJECTION, SOLUTION EPIDURAL; INFILTRATION; INTRACAUDAL; PERINEURAL AS NEEDED
Status: DISCONTINUED | OUTPATIENT
Start: 2022-01-31 | End: 2022-01-31 | Stop reason: HOSPADM

## 2022-01-31 RX ORDER — SODIUM CHLORIDE 0.9 % (FLUSH) 0.9 %
5-40 SYRINGE (ML) INJECTION EVERY 8 HOURS
Status: DISCONTINUED | OUTPATIENT
Start: 2022-01-31 | End: 2022-01-31 | Stop reason: HOSPADM

## 2022-01-31 RX ORDER — ATROPINE SULFATE 0.1 MG/ML
0.5 INJECTION INTRAVENOUS
Status: DISCONTINUED | OUTPATIENT
Start: 2022-01-31 | End: 2022-01-31 | Stop reason: HOSPADM

## 2022-01-31 RX ORDER — SODIUM CHLORIDE 9 MG/ML
INJECTION, SOLUTION INTRAVENOUS
Status: DISCONTINUED | OUTPATIENT
Start: 2022-01-31 | End: 2022-01-31 | Stop reason: HOSPADM

## 2022-01-31 RX ORDER — FLUMAZENIL 0.1 MG/ML
0.2 INJECTION INTRAVENOUS
Status: DISCONTINUED | OUTPATIENT
Start: 2022-01-31 | End: 2022-01-31 | Stop reason: HOSPADM

## 2022-01-31 RX ADMIN — PROPOFOL 75 MG: 10 INJECTION, EMULSION INTRAVENOUS at 14:14

## 2022-01-31 RX ADMIN — SODIUM CHLORIDE: 900 INJECTION, SOLUTION INTRAVENOUS at 14:06

## 2022-01-31 RX ADMIN — PROPOFOL 25 MG: 10 INJECTION, EMULSION INTRAVENOUS at 14:15

## 2022-01-31 RX ADMIN — LIDOCAINE HYDROCHLORIDE 50 MG: 20 INJECTION, SOLUTION EPIDURAL; INFILTRATION; INTRACAUDAL; PERINEURAL at 14:14

## 2022-01-31 RX ADMIN — PROPOFOL 50 MG: 10 INJECTION, EMULSION INTRAVENOUS at 14:21

## 2022-01-31 RX ADMIN — PROPOFOL 50 MG: 10 INJECTION, EMULSION INTRAVENOUS at 14:16

## 2022-01-31 NOTE — ANESTHESIA PREPROCEDURE EVALUATION
Relevant Problems   NEUROLOGY   (+) Other complicated headache syndrome       Anesthetic History   No history of anesthetic complications            Review of Systems / Medical History  Patient summary reviewed, nursing notes reviewed and pertinent labs reviewed    Pulmonary  Within defined limits                 Neuro/Psych   Within defined limits           Cardiovascular              Hyperlipidemia         GI/Hepatic/Renal  Within defined limits              Endo/Other        Arthritis     Other Findings              Physical Exam    Airway  Mallampati: II  TM Distance: > 6 cm  Neck ROM: normal range of motion   Mouth opening: Normal     Cardiovascular    Rhythm: regular  Rate: normal         Dental  No notable dental hx       Pulmonary  Breath sounds clear to auscultation               Abdominal  GI exam deferred       Other Findings            Anesthetic Plan    ASA: 2  Anesthesia type: MAC          Induction: Intravenous  Anesthetic plan and risks discussed with: Patient

## 2022-01-31 NOTE — ANESTHESIA POSTPROCEDURE EVALUATION
Post-Anesthesia Evaluation and Assessment    Patient: Nitin Fisher MRN: [id number]  SSN: [id number]    YOB: 1970  Age: 54 y.o. Sex: female      I have evaluated the patient and they are stable and ready for discharge from the PACU. Cardiovascular Function/Vital Signs  Visit Vitals  /73   Pulse 74   Temp 36.9 °C (98.4 °F)   Resp 20   Ht 5' 6\" (1.676 m)   Wt 88.5 kg (195 lb)   SpO2 96%   Breastfeeding No   BMI 31.47 kg/m²       Patient is status post MAC anesthesia for Procedure(s): FLEXIBLE SIGMOIDOSCOPY will bring  COLON BIOPSY. Nausea/Vomiting: None    Postoperative hydration reviewed and adequate. Pain:  Pain Scale 1: Numeric (0 - 10) (01/31/22 1440)  Pain Intensity 1: 0 (01/31/22 1440)   Managed    Neurological Status: At baseline    Mental Status, Level of Consciousness: Alert and  oriented to person, place, and time    Pulmonary Status:   O2 Device: None (Room air) (01/31/22 1440)   Adequate oxygenation and airway patent    Complications related to anesthesia: None    Post-anesthesia assessment completed. No concerns    Signed By: Saul Anguiano MD     January 31, 2022              Procedure(s): FLEXIBLE SIGMOIDOSCOPY will bring  COLON BIOPSY. MAC    <BSHSIANPOST>    INITIAL Post-op Vital signs:   Vitals Value Taken Time   /89 01/31/22 1450   Temp 36.9 °C (98.4 °F) 01/31/22 1430   Pulse 66 01/31/22 1451   Resp 12 01/31/22 1451   SpO2 98 % 01/31/22 1449   Vitals shown include unvalidated device data.

## 2022-01-31 NOTE — H&P
Pre-Endoscopy H&P   Chief complaint: ulcerative colitis    HPI:  Patient presents for procedure. The indication for the procedure, the patient's history and the patient's current medications are reviewed prior to the procedure and that data is reported on the endoscopy note in the chart to which this document is attached. Any significant complaints with regard to organ systems will be noted, and if not mentioned then a review of systems is not contributory. Past Medical History:   Diagnosis Date    Arthritis     joints, back,neck    Herniated cervical disc     c4, c6    High cholesterol     Ill-defined condition 2021    spinal fluid leak s/p pain injection    Mononucleosis     Palpitations       Past Surgical History:   Procedure Laterality Date    COLONOSCOPY N/A 2021    COLONOSCOPY/EGD performed by Marcos Muller MD at [address] [facility]    nose    HX OTHER SURGICAL  2021    blood patch lumbar    IR INJ BLOOD PATCH  2021     Social   Social History     Tobacco Use    Smoking status: Former Smoker     Quit date: 3/7/2015     Years since quittin.9    Smokeless tobacco: Former User     Quit date: 2015   Substance Use Topics    Alcohol use: Yes     Alcohol/week: 14.0 standard drinks     Types: 14 Cans of beer per week      Family History   Problem Relation Age of Onset    Cancer Mother     Heart Disease Father     Breast Cancer Sister 52    Cancer Sister     Breast Cancer Maternal Aunt     Heart Disease Brother       Allergies   Allergen Reactions    Diamox Sequels [Acetazolamide] Rash    Sulfa (Sulfonamide Antibiotics) Swelling      Prior to Admission Medications   Prescriptions Last Dose Informant Patient Reported? Taking? OTHER   No No   Sig: Cervical blood patch-C5-C6  Indications: Spinal headache after epidural steroid injection   acetaminophen (TYLENOL PO) Not Taking at Unknown time  Yes No   Sig: Take 500 mg by mouth as needed. Patient not taking: Reported on 1/31/2022   albuterol (PROVENTIL HFA, VENTOLIN HFA, PROAIR HFA) 90 mcg/actuation inhaler 1/24/2022 at Unknown time  Yes Yes   Sig: Take  by inhalation. cetirizine (ZYRTEC) 10 mg tablet 1/30/2022 at Unknown time  Yes Yes   Sig: Take 10 mg by mouth. cyclobenzaprine (FLEXERIL) 10 mg tablet 1/30/2022 at Unknown time  No Yes   Sig: TAKE 1 TABLET BY MOUTH TWICE A DAY   ibuprofen (Motrin IB) 200 mg tablet 1/28/2022  Yes No   Sig: Take 200 mg by mouth. 1-2 per day   pseudoephedrine-ibuprofen (Advil Cold and Sinus)  mg tab 1/24/2022 at Unknown time  Yes Yes   Sig: Take  by mouth. Once daily      Facility-Administered Medications: None       PHYSICAL EXAM:  The patient is examined immediately prior to the procedure. Visit Vitals  Ht 5' 6\" (1.676 m)   Wt 88.5 kg (195 lb)   Breastfeeding No   BMI 31.47 kg/m²     Gen: Appears comfortable, no distress. Pulm: comfortable respirations with no abnormal audible breath sounds  CV: heart regular, well perfused  GI: abdomen flat. ASSESSMENT:  Patient here for procedure. The indication for the procedure, the patient's history and the patient's current medications are reviewed prior to the procedure and that data is reported on the endoscopy note in the chart to which this document is attached. PLAN:  Informed consent discussion held, patient afforded an opportunity to ask questions and all questions answered. After being advised of the risks, benefits, and alternatives, the patient requested that we proceed and indicated so on a written consent form. Will proceed with procedure as planned.   Irving Ordonez MD

## 2022-01-31 NOTE — DISCHARGE INSTRUCTIONS
[address] D. Roland Zazueta MD  [phone]      January 31, 2022    Lidia Guzman  YOB: 1970    COLONOSCOPY DISCHARGE INSTRUCTIONS    If there is redness at IV site you should apply warm compress to area. If redness or soreness persist contact Dr. Roland Zazueta or your primary care doctor. There may be a slight amount of blood passed from the rectum. Gaseous discomfort may develop, but walking, belching will help relieve this. You may not operate a vehicle for 12 hours  You may not operate machinery or dangerous appliances for rest of today  You may not drink alcoholic beverages for 12 hours  Avoid making any critical decisions for 24 hours    DIET:  You may resume your normal diet, but some patients find that heavy or large meals may lead to indigestion or vomiting. I suggest a light meal as first food intake. MEDICATIONS:  The use of some over-the-counter pain medication may lead to bleeding after colon biopsies or polyp removal.  Tylenol (also called acetaminophen) is safe to take even if you have had colonoscopy with polyp removal.  Based on the procedure you had today you may safely take aspirin or aspirin-like products for the next ten (10) days. ACTIVITY:  You may resume your normal household activities, but it is recommended that you spend the remainder of the day resting -  avoid any strenuous activity. CALL DR. DICKENS'S OFFICE IF:  Increasing pain, nausea, vomiting  Abdominal distension (swelling)  Significant new or increased bleeding (oral or rectal)  Fever/Chills  Chest pain/shortness of breath                       Additional instructions:   - Await pathology evaluation of biopsy / resected tissue  -Clinical remission achieved; Decrease Asacol to 1 tablet three times a day (or two in the morning and one in the evening) (800mg TID)  - Will arrange for follow up visit in 3 months     It was an honor to be your doctor today. Please let me or my office staff know if you have any feedback about today's procedure. Feng Braswell MD, January 31, 2022    Colonoscopy saves lives, and can prevent colon cancer. Everyone aged 48 or older needs colonoscopy.   Tell your family and friends: get the test!

## 2022-01-31 NOTE — PROGRESS NOTES

## 2022-01-31 NOTE — ROUTINE PROCESS
Pawel Huttonr  1970  [id number]    Situation:  Verbal report received from: Reyna Wong RN  Procedure: Procedure(s): FLEXIBLE SIGMOIDOSCOPY will bring  COLON BIOPSY    Background:    Preoperative diagnosis: ABDOMINAL DISCOMFORT, HEMATOCHEZIA  Postoperative diagnosis: Ulcerative Colitis    :  Dr. Juan Castro  Assistant(s): Endoscopy Technician-1: Marcella Swift  Endoscopy RN-1: Lennox Canal, RN    Specimens:   ID Type Source Tests Collected by Time Destination   1 : Random Left Colon bx Preservative   Fide Robbins MD 1/31/2022 1417 Pathology   2 : Random Rectum bx Preservative   Fide Robbins MD 1/31/2022 1418 Pathology     H. Pylori  no    Assessment:  Intra-procedure medications   Anesthesia gave intra-procedure sedation and medications, see anesthesia flow sheet yes    Intravenous fluids: NS@ KVO     Vital signs stable     Abdominal assessment: round and soft     Recommendation:  Discharge patient per MD order.     Family or Friend   Permission to share finding with family or friend yes

## 2022-02-01 ENCOUNTER — VIRTUAL VISIT (OUTPATIENT)
Dept: INTERNAL MEDICINE CLINIC | Age: 52
End: 2022-02-01
Payer: MEDICAID

## 2022-02-01 DIAGNOSIS — Z12.31 ENCOUNTER FOR SCREENING MAMMOGRAM FOR MALIGNANT NEOPLASM OF BREAST: ICD-10-CM

## 2022-02-01 DIAGNOSIS — Z00.00 ROUTINE MEDICAL EXAM: Primary | ICD-10-CM

## 2022-02-01 DIAGNOSIS — H53.8 BLURRY VISION: ICD-10-CM

## 2022-02-01 DIAGNOSIS — M48.02 CERVICAL SPINAL STENOSIS: ICD-10-CM

## 2022-02-01 DIAGNOSIS — J30.9 CHRONIC ALLERGIC RHINITIS: ICD-10-CM

## 2022-02-01 DIAGNOSIS — F41.8 ANXIETY ABOUT HEALTH: ICD-10-CM

## 2022-02-01 DIAGNOSIS — F32.A MILD DEPRESSION: ICD-10-CM

## 2022-02-01 DIAGNOSIS — Z76.89 ENCOUNTER TO ESTABLISH CARE: ICD-10-CM

## 2022-02-01 DIAGNOSIS — G96.00 CSF LEAK: ICD-10-CM

## 2022-02-01 DIAGNOSIS — E78.00 HYPERCHOLESTEREMIA: ICD-10-CM

## 2022-02-01 PROCEDURE — 99204 OFFICE O/P NEW MOD 45 MIN: CPT | Performed by: NURSE PRACTITIONER

## 2022-02-01 RX ORDER — VENLAFAXINE HYDROCHLORIDE 37.5 MG/1
37.5 CAPSULE, EXTENDED RELEASE ORAL DAILY
Qty: 30 CAPSULE | Refills: 1 | Status: SHIPPED | OUTPATIENT
Start: 2022-02-01 | End: 2022-10-17

## 2022-02-01 RX ORDER — ALBUTEROL SULFATE 90 UG/1
1 AEROSOL, METERED RESPIRATORY (INHALATION)
Qty: 18 G | Refills: 0 | Status: SHIPPED | OUTPATIENT
Start: 2022-02-01

## 2022-02-01 RX ORDER — CYCLOBENZAPRINE HCL 10 MG
10 TABLET ORAL 2 TIMES DAILY
Qty: 60 TABLET | Refills: 1 | Status: SHIPPED | OUTPATIENT
Start: 2022-02-01

## 2022-02-01 RX ORDER — MESALAMINE 800 MG/1
TABLET, DELAYED RELEASE ORAL
COMMUNITY
Start: 2022-01-25

## 2022-02-01 NOTE — PROGRESS NOTES
Latanya Mahoney  Identified pt with two pt identifiers(name and ). Chief Complaint   Patient presents with   [address]     previous pcp - dr Remedios Becerra @ [address] [place] [address] in [place] // pain - 0     Allergies    Referral / Consult     spinal fluid leakage     Medication Refill       Reviewed record In preparation for visit and have obtained necessary documentation. 1. Have you been to the ER, urgent care clinic or hospitalized since your last visit? No     2. Have you seen or consulted any other health care providers outside of the [address] [place] [address] [facility] since your last visit? Include any pap smears or colon screening. Yes. Robertson     Patient does not have an advance directive. Vitals reviewed with provider. Health Maintenance reviewed:     Health Maintenance Due   Topic    Hepatitis C Screening     Breast Cancer Screen Mammogram     Cervical cancer screen     COVID-19 Vaccine (2 - Pfizer 3-dose series)          Wt Readings from Last 3 Encounters:   22 195 lb (88.5 kg)   21 190 lb (86.2 kg)   21 189 lb 11.2 oz (86 kg)        Temp Readings from Last 3 Encounters:   22 98.4 °F (36.9 °C)   21 99.6 °F (37.6 °C)   21 98.1 °F (36.7 °C)        BP Readings from Last 3 Encounters:   22 (!) 156/89   21 (!) 115/94   21 135/80        Pulse Readings from Last 3 Encounters:   22 72   21 82   21 77      There were no vitals filed for this visit.        Learning Assessment:   :       Learning Assessment 2022 2021 4/15/2021   PRIMARY LEARNER Patient Patient Patient   HIGHEST LEVEL OF EDUCATION - PRIMARY LEARNER  SOME COLLEGE - -   BARRIERS PRIMARY LEARNER NONE - -   PRIMARY LANGUAGE ENGLISH ENGLISH ENGLISH   LEARNER PREFERENCE PRIMARY DEMONSTRATION READING READING   ANSWERED BY Patient patient patient   RELATIONSHIP SELF SELF SELF        Depression Screening:   :       3 most recent PHQ Screens 2022   Little interest or pleasure in doing things Not at all   Feeling down, depressed, irritable, or hopeless Not at all   Total Score PHQ 2 0        Fall Risk Assessment:   :       Fall Risk Assessment, last 12 mths 3/11/2021   Able to walk? Yes   Fall in past 12 months? 0   Do you feel unsteady? 1   Are you worried about falling 0   Is the gait abnormal? 0        Abuse Screening:   :     No flowsheet data found.      ADL Screening:   :       ADL Assessment 2/1/2022   Feeding yourself No Help Needed   Getting from bed to chair No Help Needed   Getting dressed No Help Needed   Bathing or showering No Help Needed   Walk across the room (includes cane/walker) No Help Needed   Using the telphone No Help Needed   Taking your medications No Help Needed   Preparing meals No Help Needed   Managing money (expenses/bills) No Help Needed   Moderately strenuous housework (laundry) No Help Needed   Shopping for personal items (toiletries/medicines) No Help Needed   Shopping for groceries No Help Needed   Driving No Help Needed   Climbing a flight of stairs No Help Needed   Getting to places beyond walking distances No Help Needed

## 2022-02-01 NOTE — PATIENT INSTRUCTIONS
Anxiety Disorder: Care Instructions  Your Care Instructions     Anxiety is a normal reaction to stress. Difficult situations can cause you to have symptoms such as sweaty palms and a nervous feeling. In an anxiety disorder, the symptoms are far more severe. Constant worry, muscle tension, trouble sleeping, nausea and diarrhea, and other symptoms can make normal daily activities difficult or impossible. These symptoms may occur for no reason, and they can affect your work, school, or social life. Medicines, counseling, and self-care can all help. Follow-up care is a key part of your treatment and safety. Be sure to make and go to all appointments, and call your doctor if you are having problems. It's also a good idea to know your test results and keep a list of the medicines you take. How can you care for yourself at home? · Take medicines exactly as directed. Call your doctor if you think you are having a problem with your medicine. · Go to your counseling sessions and follow-up appointments. · Recognize and accept your anxiety. Then, when you are in a situation that makes you anxious, say to yourself, \"This is not an emergency. I feel uncomfortable, but I am not in danger. I can keep going even if I feel anxious. \"  · Be kind to your body:  ? Relieve tension with exercise or a massage. ? Get enough rest.  ? Avoid alcohol, caffeine, nicotine, and illegal drugs. They can increase your anxiety level and cause sleep problems. ? Learn and do relaxation techniques. See below for more about these techniques. · Engage your mind. Get out and do something you enjoy. Go to a funny movie, or take a walk or hike. Plan your day. Having too much or too little to do can make you anxious. · Keep a record of your symptoms. Discuss your fears with a good friend or family member, or join a support group for people with similar problems. Talking to others sometimes relieves stress.   · Get involved in social groups, or volunteer to help others. Being alone sometimes makes things seem worse than they are. · Get at least 30 minutes of exercise on most days of the week to relieve stress. Walking is a good choice. You also may want to do other activities, such as running, swimming, cycling, or playing tennis or team sports. Relaxation techniques  Do relaxation exercises 10 to 20 minutes a day. You can play soothing, relaxing music while you do them, if you wish. · Tell others in your house that you are going to do your relaxation exercises. Ask them not to disturb you. · Find a comfortable place, away from all distractions and noise. · Lie down on your back, or sit with your back straight. · Focus on your breathing. Make it slow and steady. · Breathe in through your nose. Breathe out through either your nose or mouth. · Breathe deeply, filling up the area between your navel and your rib cage. Breathe so that your belly goes up and down. · Do not hold your breath. · Breathe like this for 5 to 10 minutes. Notice the feeling of calmness throughout your whole body. As you continue to breathe slowly and deeply, relax by doing the following for another 5 to 10 minutes:  · Tighten and relax each muscle group in your body. You can begin at your toes and work your way up to your head. · Imagine your muscle groups relaxing and becoming heavy. · Empty your mind of all thoughts. · Let yourself relax more and more deeply. · Become aware of the state of calmness that surrounds you. · When your relaxation time is over, you can bring yourself back to alertness by moving your fingers and toes and then your hands and feet and then stretching and moving your entire body. Sometimes people fall asleep during relaxation, but they usually wake up shortly afterward. · Always give yourself time to return to full alertness before you drive a car or do anything that might cause an accident if you are not fully alert.  Never play a relaxation tape while you drive a car. When should you call for help? Call 911 anytime you think you may need emergency care. For example, call if:    · You feel you cannot stop from hurting yourself or someone else. Keep the numbers for these national suicide hotlines: 9-609-207-TALK (2-904.463.4224) and 8-209-KRROWHV (9-168.799.4328). If you or someone you know talks about suicide or feeling hopeless, get help right away. Watch closely for changes in your health, and be sure to contact your doctor if:    · You have anxiety or fear that affects your life.     · You have symptoms of anxiety that are new or different from those you had before. Where can you learn more? Go to http://www.oneill.com/  Enter P754 in the search box to learn more about \"Anxiety Disorder: Care Instructions. \"  Current as of: June 16, 2021               Content Version: 13.0  © 2006-2021 [employer]. Care instructions adapted under license by [employer] (which disclaims liability or warranty for this information). If you have questions about a medical condition or this instruction, always ask your healthcare professional. [address] any warranty or liability for your use of this information. Recovering From Depression: Care Instructions  Your Care Instructions     Taking good care of yourself is important as you recover from depression. In time, your symptoms will fade as your treatment takes hold. Do not give up. Instead, focus your energy on getting better. Your mood will improve. It just takes some time. Focus on things that can help you feel better, such as being with friends and family, eating well, and getting enough rest. But take things slowly. Do not do too much too soon. You will begin to feel better gradually. Follow-up care is a key part of your treatment and safety.  Be sure to make and go to all appointments, and call your doctor if you are having problems. It's also a good idea to know your test results and keep a list of the medicines you take. How can you care for yourself at home? Be realistic  · If you have a large task to do, break it up into smaller steps you can handle, and just do what you can. · You may want to put off important decisions until your depression has lifted. If you have plans that will have a major impact on your life, such as marriage, divorce, or a job change, try to wait a bit. Talk it over with friends and loved ones who can help you look at the overall picture first.  · Reaching out to people for help is important. Do not isolate yourself. Let your family and friends help you. Find someone you can trust and confide in, and talk to that person. · Be patient, and be kind to yourself. Remember that depression is not your fault and is not something you can overcome with willpower alone. Treatment is important for depression, just like for any other illness. Feeling better takes time, and your mood will improve little by little. Stay active  · Stay busy and get outside. Take a walk, or try some other light exercise. · Talk with your doctor about an exercise program. Exercise can help with mild depression. · Go to a movie or concert. Take part in a [employer] activity or other social gathering. Go to a ball game. · Ask a friend to have dinner with you. Take care of yourself  · Eat a balanced diet with plenty of fresh fruits and vegetables, whole grains, and lean protein. If you have lost your appetite, eat small snacks rather than large meals. · Avoid using illegal drugs or marijuana and drinking alcohol. Do not take medicines that have not been prescribed for you. They may interfere with medicines you may be taking for depression, or they may make your depression worse. · Take your medicines exactly as they are prescribed. You may start to feel better within 1 to 3 weeks of taking antidepressant medicine.  But it can take as many as 6 to 8 weeks to see more improvement. If you have questions or concerns about your medicines, or if you do not notice any improvement by 3 weeks, talk to your doctor. · Continue to take your medicine after your symptoms improve. Taking your medicine for at least 6 months after you feel better can help keep you from getting depressed again. If this isn't the first time you have been depressed, your doctor may recommend you to take medicine even longer. · If you have any side effects from your medicine, tell your doctor. Many side effects are mild and will go away on their own after you have been taking the medicine for a few weeks. Some may last longer. Talk to your doctor if side effects are bothering you too much. You might be able to try a different medicine. · Continue counseling. It may help prevent depression from returning, especially if you've had multiple episodes of depression. Talk with your counselor if you are having a hard time attending your sessions or you think the sessions aren't working. Don't just stop going. · Get enough sleep. Talk to your doctor if you are having problems sleeping. · Avoid sleeping pills unless they are prescribed by the doctor treating your depression. Sleeping pills may make you groggy during the day, and they may interact with other medicine you are taking. · If you have any other illnesses, such as diabetes, heart disease, or high blood pressure, make sure to continue with your treatment. Tell your doctor about all of the medicines you take, including those with or without a prescription. · If you or someone you know talks about suicide, self-harm, or feeling hopeless, get help right away. Call the [facility] at 1-800-273-talk (4-975.257.3648) or text HOME to 987355 to access the Crisis Text Line. Consider saving these numbers in your phone. When should you call for help? Call 892 anytime you think you may need emergency care.  For example, call if:    · You feel like hurting yourself or someone else.     · Someone you know has depression and is about to attempt or is attempting suicide. Call your doctor now or seek immediate medical care if:    · You hear voices.     · Someone you know has depression and:  ? Starts to give away his or her possessions. ? Uses illegal drugs or drinks alcohol heavily. ? Talks or writes about death, including writing suicide notes or talking about guns, knives, or pills. ? Starts to spend a lot of time alone. ? Acts very aggressively or suddenly appears calm. Watch closely for changes in your health, and be sure to contact your doctor if:    · You do not get better as expected. Where can you learn more? Go to http://www.gray.com/  Enter N529 in the search box to learn more about \"Recovering From Depression: Care Instructions. \"  Current as of: June 16, 2021               Content Version: 13.0  © 2006-2021 [employer]. Care instructions adapted under license by [employer] (which disclaims liability or warranty for this information). If you have questions about a medical condition or this instruction, always ask your healthcare professional. [address] any warranty or liability for your use of this information.

## 2022-03-02 ENCOUNTER — HOSPITAL ENCOUNTER (OUTPATIENT)
Dept: MAMMOGRAPHY | Age: 52
Discharge: HOME | End: 2022-03-02
Attending: NURSE PRACTITIONER
Payer: MEDICAID

## 2022-03-02 DIAGNOSIS — Z12.31 ENCOUNTER FOR SCREENING MAMMOGRAM FOR MALIGNANT NEOPLASM OF BREAST: ICD-10-CM

## 2022-03-02 LAB
ALBUMIN SERPL-MCNC: 4.6 G/DL (ref 3.8–4.8)
ALBUMIN/GLOB SERPL: 1.7 {RATIO} (ref 1.2–2.2)
ALP SERPL-CCNC: 74 IU/L (ref 44–121)
ALT SERPL-CCNC: 15 IU/L (ref 0–32)
AST SERPL-CCNC: 13 IU/L (ref 0–40)
BASOPHILS # BLD AUTO: 0.1 X10E3/UL (ref 0–0.2)
BASOPHILS NFR BLD AUTO: 2 %
BILIRUB SERPL-MCNC: 0.2 MG/DL (ref 0–1.2)
BUN SERPL-MCNC: 8 MG/DL (ref 6–24)
BUN/CREAT SERPL: 9 (ref 9–23)
CALCIUM SERPL-MCNC: 9.2 MG/DL (ref 8.7–10.2)
CHLORIDE SERPL-SCNC: 101 MMOL/L (ref 96–106)
CHOLEST SERPL-MCNC: 302 MG/DL (ref 100–199)
CO2 SERPL-SCNC: 20 MMOL/L (ref 20–29)
CREAT SERPL-MCNC: 0.91 MG/DL (ref 0.57–1)
EGFR: 77 ML/MIN/1.73
EOSINOPHIL # BLD AUTO: 0.3 X10E3/UL (ref 0–0.4)
EOSINOPHIL NFR BLD AUTO: 6 %
ERYTHROCYTE [DISTWIDTH] IN BLOOD BY AUTOMATED COUNT: 12.4 % (ref 11.7–15.4)
GLOBULIN SER CALC-MCNC: 2.7 G/DL (ref 1.5–4.5)
GLUCOSE SERPL-MCNC: 95 MG/DL (ref 65–99)
HCT VFR BLD AUTO: 43.4 % (ref 34–46.6)
HDLC SERPL-MCNC: 46 MG/DL
HGB BLD-MCNC: 14.4 G/DL (ref 11.1–15.9)
IMM GRANULOCYTES # BLD AUTO: 0 X10E3/UL (ref 0–0.1)
IMM GRANULOCYTES NFR BLD AUTO: 0 %
LDLC SERPL CALC-MCNC: 237 MG/DL (ref 0–99)
LYMPHOCYTES # BLD AUTO: 1.9 X10E3/UL (ref 0.7–3.1)
LYMPHOCYTES NFR BLD AUTO: 36 %
MCH RBC QN AUTO: 30.1 PG (ref 26.6–33)
MCHC RBC AUTO-ENTMCNC: 33.2 G/DL (ref 31.5–35.7)
MCV RBC AUTO: 91 FL (ref 79–97)
MONOCYTES # BLD AUTO: 0.4 X10E3/UL (ref 0.1–0.9)
MONOCYTES NFR BLD AUTO: 9 %
NEUTROPHILS # BLD AUTO: 2.5 X10E3/UL (ref 1.4–7)
NEUTROPHILS NFR BLD AUTO: 47 %
PLATELET # BLD AUTO: 333 X10E3/UL (ref 150–450)
POTASSIUM SERPL-SCNC: 4.8 MMOL/L (ref 3.5–5.2)
PROT SERPL-MCNC: 7.3 G/DL (ref 6–8.5)
RBC # BLD AUTO: 4.78 X10E6/UL (ref 3.77–5.28)
SODIUM SERPL-SCNC: 139 MMOL/L (ref 134–144)
TRIGL SERPL-MCNC: 106 MG/DL (ref 0–149)
VLDLC SERPL CALC-MCNC: 19 MG/DL (ref 5–40)
WBC # BLD AUTO: 5.2 X10E3/UL (ref 3.4–10.8)

## 2022-03-02 PROCEDURE — 77067 SCR MAMMO BI INCL CAD: CPT

## 2022-03-07 NOTE — PROGRESS NOTES
Pawel Gr is a 54 y.o. female who was seen by synchronous (real-time) audio-video technology on 3/8/2022 for Medication Evaluation (effexor & mesalamine might has some interaction // has not started the effexor yet // pain - 0), Labs (possible cholesterol medicaiton - on simvastatin in the past), and Allergies (allergy testing tomorrow morning // eyes watering/itching & wheezing // thinks it is due to carpet)        Assessment & Plan:     Diagnoses and all orders for this visit:    1. Mild depression ([address]  Discussed no interaction of effexor with her current meds  She will start after allergy testing and fu in 4-6 weeks    2. Anxiety about health  See above    3. Hypercholesteremia  -     simvastatin (ZOCOR) 20 mg tablet; Take 1 Tablet by mouth nightly. START NEW MED    4. Chronic allergic rhinitis  [facility], has testing tomorrow      Follow-up and Dispositions    · Return if symptoms worsen or fail to improve. Subjective:     Pt here to fu on anxiety and depression and to discuss labs. She has not started the effexor due to concern for medication interaction with mesalamine. Follows with GI for UC. She has allergy testing tomorrow at [facility]. Has been off antihistamines for 1 week. Reports she has strong family hx of high chol. Has been avoiding fried foods and red meat. Has taken zocor in the past and willing to restart.         Lab Results   Component Value Date/Time    Cholesterol, total 302 (H) 03/01/2022 12:24 PM    HDL Cholesterol 46 03/01/2022 12:24 PM    LDL, calculated 237 (H) 03/01/2022 12:24 PM    LDL, calculated 225.6 (H) 01/06/2021 10:30 AM    VLDL, calculated 19 03/01/2022 12:24 PM    VLDL, calculated 15.4 01/06/2021 10:30 AM    Triglyceride 106 03/01/2022 12:24 PM    CHOL/HDL Ratio 4.6 01/06/2021 10:30 AM         3 most recent PHQ Screens 2/1/2022   Little interest or pleasure in doing things Not at all   Feeling down, depressed, irritable, or hopeless Not at all Total Score PHQ 2 0       Prior to Admission medications    Medication Sig Start Date End Date Taking? Authorizing Provider   fexofenadine HCl (ALLEGRA ALLERGY PO) Take  by mouth. Yes Provider, Historical   mesalamine DR (ASACOL HD) 800 mg DR tablet  1/25/22  Yes Provider, Historical   cyclobenzaprine (FLEXERIL) 10 mg tablet Take 1 Tablet by mouth two (2) times a day. 2/1/22  Yes Amie Gutiérrez NP   albuterol (PROVENTIL HFA, VENTOLIN HFA, PROAIR HFA) 90 mcg/actuation inhaler Take 1 Puff by inhalation every four (4) hours as needed for Wheezing. 2/1/22  Yes Amie Gutiérrez NP   acetaminophen (TYLENOL PO) Take 500 mg by mouth as needed. Yes Provider, Historical   pseudoephedrine-ibuprofen (Advil Cold and Sinus)  mg tab Take  by mouth. Once daily   Yes Provider, Historical   OTHER Cervical blood patch-C5-C6  Indications: Spinal headache after epidural steroid injection 4/6/21  Yes David Arredondo MD   ibuprofen (Motrin IB) 200 mg tablet Take 200 mg by mouth. 1-2 per day   Yes Provider, Historical   venlafaxine-SR (EFFEXOR-XR) 37.5 mg capsule Take 1 Capsule by mouth daily. Patient not taking: Reported on 3/8/2022 2/1/22   Amie Gutiérrez NP   cetirizine (ZYRTEC) 10 mg tablet Take 10 mg by mouth. Patient not taking: Reported on 3/8/2022    Provider, Historical         ROS see hpi  This visit was completed virtually using doxy. me       Objective:     Patient-Reported Vitals 3/8/2022   Patient-Reported Weight 195lb   Patient-Reported Height -   Patient-Reported Pulse -   Patient-Reported Temperature -   Patient-Reported Systolic  211   Patient-Reported Diastolic 73        [INSTRUCTIONS:  \"[x]\" Indicates a positive item  \"[]\" Indicates a negative item  -- DELETE ALL ITEMS NOT EXAMINED]    Constitutional: [x] Appears well-developed and well-nourished [x] No apparent distress      [] Abnormal -     Mental status: [x] Alert and awake  [x] Oriented to person/place/time [x] Able to follow commands    [] Abnormal -     Eyes:   EOM    [x]  Normal    [] Abnormal -   Sclera  [x]  Normal    [] Abnormal -          Discharge [x]  None visible   [] Abnormal -     HENT: [x] Normocephalic, atraumatic  [] Abnormal -   [x] Mouth/Throat: Mucous membranes are moist    External Ears [x] Normal  [] Abnormal -    Neck: [x] No visualized mass [] Abnormal -     Pulmonary/Chest: [x] Respiratory effort normal   [x] No visualized signs of difficulty breathing or respiratory distress        [] Abnormal -      Musculoskeletal:   [x] Normal gait with no signs of ataxia         [x] Normal range of motion of neck        [] Abnormal -     Neurological:        [x] No Facial Asymmetry (Cranial nerve 7 motor function) (limited exam due to video visit)          [x] No gaze palsy        [] Abnormal -          Skin:        [x] No significant exanthematous lesions or discoloration noted on facial skin         [] Abnormal -            Psychiatric:       [x] Normal Affect [] Abnormal -        [x] No Hallucinations    Other pertinent observable physical exam findings:-        We discussed the expected course, resolution and complications of the diagnosis(es) in detail. Medication risks, benefits, costs, interactions, and alternatives were discussed as indicated. I advised her to contact the office if her condition worsens, changes or fails to improve as anticipated. She expressed understanding with the diagnosis(es) and plan. Lidia Megan, was evaluated through a synchronous (real-time) audio-video encounter. The patient (or guardian if applicable) is aware that this is a billable service, which includes applicable co-pays. Verbal consent to proceed has been obtained. The visit was conducted pursuant to the emergency declaration under the [address] [place] [address], [address] [place] [address] authority and the [employer] General Act.   Patient identification was verified, and a caregiver was present when appropriate. The patient was located at home in a state where the provider was licensed to provide care.       Gib Cea, NP

## 2022-03-08 ENCOUNTER — VIRTUAL VISIT (OUTPATIENT)
Dept: INTERNAL MEDICINE CLINIC | Age: 52
End: 2022-03-08
Payer: MEDICAID

## 2022-03-08 DIAGNOSIS — E78.00 HYPERCHOLESTEREMIA: ICD-10-CM

## 2022-03-08 DIAGNOSIS — J30.9 CHRONIC ALLERGIC RHINITIS: ICD-10-CM

## 2022-03-08 DIAGNOSIS — F32.A MILD DEPRESSION: Primary | ICD-10-CM

## 2022-03-08 DIAGNOSIS — F41.8 ANXIETY ABOUT HEALTH: ICD-10-CM

## 2022-03-08 PROCEDURE — 99213 OFFICE O/P EST LOW 20 MIN: CPT | Performed by: NURSE PRACTITIONER

## 2022-03-08 RX ORDER — SIMVASTATIN 20 MG/1
20 TABLET, FILM COATED ORAL
Qty: 90 TABLET | Refills: 3 | Status: SHIPPED | OUTPATIENT
Start: 2022-03-08

## 2022-03-08 NOTE — PROGRESS NOTES
Akos[place] Degree  Identified pt with two pt identifiers(name and ). Chief Complaint   Patient presents with    Medication Evaluation     effexor & mesalamine might has some interaction // has not started the effexor yet // pain - 0    Labs     possible cholesterol medicaiton - on simvastatin in the past    Allergies     allergy testing tomorrow morning // eyes watering/itching & wheezing // thinks it is due to carpet       Reviewed record In preparation for visit and have obtained necessary documentation. 1. Have you been to the ER, urgent care clinic or hospitalized since your last visit? No     2. Have you seen or consulted any other health care providers outside of the [address] [place] [address] since your last visit? Include any pap smears or colon screening. Yes. Dr[facility]. Eye Exam - Dr. Sathish Kemp. Nicole Jacobo    Patient does not have an advance directive. Vitals reviewed with provider. Health Maintenance reviewed:     Health Maintenance Due   Topic    Hepatitis C Screening     Cervical cancer screen     COVID-19 Vaccine (2 - Pfizer 3-dose series)          Wt Readings from Last 3 Encounters:   22 195 lb (88.5 kg)   21 190 lb (86.2 kg)   21 189 lb 11.2 oz (86 kg)        Temp Readings from Last 3 Encounters:   22 98.4 °F (36.9 °C)   21 99.6 °F (37.6 °C)   21 98.1 °F (36.7 °C)        BP Readings from Last 3 Encounters:   22 (!) 156/89   21 (!) 115/94   21 135/80        Pulse Readings from Last 3 Encounters:   22 72   21 82   21 77      There were no vitals filed for this visit.        Learning Assessment:   :       Learning Assessment 2022 2021 4/15/2021   PRIMARY LEARNER Patient Patient Patient   HIGHEST LEVEL OF EDUCATION - PRIMARY LEARNER  SOME COLLEGE - -   BARRIERS PRIMARY LEARNER NONE - -   PRIMARY LANGUAGE ENGLISH ENGLISH ENGLISH   LEARNER PREFERENCE PRIMARY DEMONSTRATION READING READING   ANSWERED BY Patient patient patient   RELATIONSHIP SELF SELF SELF        Depression Screening:   :       3 most recent PHQ Screens 2/1/2022   Little interest or pleasure in doing things Not at all   Feeling down, depressed, irritable, or hopeless Not at all   Total Score PHQ 2 0        Fall Risk Assessment:   :       Fall Risk Assessment, last 12 mths 3/11/2021   Able to walk? Yes   Fall in past 12 months? 0   Do you feel unsteady? 1   Are you worried about falling 0   Is the gait abnormal? 0        Abuse Screening:   :     No flowsheet data found.      ADL Screening:   :       ADL Assessment 2/1/2022   Feeding yourself No Help Needed   Getting from bed to chair No Help Needed   Getting dressed No Help Needed   Bathing or showering No Help Needed   Walk across the room (includes cane/walker) No Help Needed   Using the telphone No Help Needed   Taking your medications No Help Needed   Preparing meals No Help Needed   Managing money (expenses/bills) No Help Needed   Moderately strenuous housework (laundry) No Help Needed   Shopping for personal items (toiletries/medicines) No Help Needed   Shopping for groceries No Help Needed   Driving No Help Needed   Climbing a flight of stairs No Help Needed   Getting to places beyond walking distances No Help Needed

## 2022-03-08 NOTE — PATIENT INSTRUCTIONS
Hyperlipidemia: After Your Visit  Your Care Instructions  Hyperlipidemia is too much fat in your blood. The body has several kinds of fat, including cholesterol and triglycerides. Your body needs fat for many things, such as making new cells. But too much fat in your blood increases your chances of having a heart attack or stroke. You may be able to lower your cholesterol and triglycerides with a heart-healthy diet, exercise, and if needed, medicine. Your doctor may want you to try lifestyle changes first to see whether they lower the fat in your blood. You may need to take medicine if lifestyle changes do not lower the fat in your blood enough. Follow-up care is a key part of your treatment and safety. Be sure to make and go to all appointments, and call your doctor if you are having problems. Its also a good idea to know your test results and keep a list of the medicines you take. How can you care for yourself at home? Take your medicines  · Take your medicines exactly as prescribed. Call your doctor if you think you are having a problem with your medicine. · If you take medicine to lower your cholesterol, go to follow-up visits. You will need to have blood tests. · Do not take large doses of niacin, which is a B vitamin, while taking medicine called statins. It may increase the chance of muscle pain and liver problems. · Talk to your doctor about avoiding grapefruit juice if you are taking statins. Grapefruit juice can raise the level of this medicine in your blood. This could increase side effects. Eat more fruits, vegetables, and fiber  · Fruits and vegetables have lots of nutrients that help protect against heart disease, and they have littleif anyfat. Try to eat at least five servings a day. Dark green, deep orange, or yellow fruits and vegetables are healthy choices. · Keep carrots, celery, and other veggies handy for snacks.  Buy fruit that is in season and store it where you can see it so that you will be tempted to eat it. Cook dishes that have a lot of veggies in them, such as stir-fries and soups. · Foods high in fiber may reduce your cholesterol and provide important vitamins and minerals. High-fiber foods include whole-grain cereals and breads, oatmeal, beans, brown rice, citrus fruits, and apples. · Buy whole-grain breads and cereals instead of white bread and pastries. Limit saturated fat  · Read food labels and try to avoid saturated fat and trans fat. They increase your risk of heart disease. · Use olive or canola oil when you cook. Try cholesterol-lowering spreads, such as Benecol or Take Control. · Bake, broil, grill, or steam foods instead of frying them. · Limit the amount of high-fat meats you eat, including hot dogs and sausages. Cut out all visible fat when you prepare meat. · Eat fish, skinless poultry, and soy products such as tofu instead of high-fat meats. Soybeans may be especially good for your heart. Eat at least two servings of fish a week. Certain fish, such as salmon, contain omega-3 fatty acids, which may help reduce your risk of heart attack. · Choose low-fat or fat-free milk and dairy products. Get exercise, limit alcohol, and quit smoking  · Get more exercise. Work with your doctor to set up an exercise program. Even if you can do only a small amount, exercise will help you get stronger, have more energy, and manage your weight and your stress. Walking is an easy way to get exercise. Gradually increase the amount you walk every day. Aim for at least 30 minutes on most days of the week. You also may want to swim, bike, or do other activities. · Limit alcohol to no more than 2 drinks a day for men and 1 drink a day for women. · Do not smoke. If you need help quitting, talk to your doctor about stop-smoking programs and medicines. These can increase your chances of quitting for good. When should you call for help?   Call 911 anytime you think you may need emergency care. For example, call if:  · You have symptoms of a heart attack. These may include:  ¨ Chest pain or pressure, or a strange feeling in the chest.  ¨ Sweating. ¨ Shortness of breath. ¨ Nausea or vomiting. ¨ Pain, pressure, or a strange feeling in the back, neck, jaw, or upper belly or in one or both shoulders or arms. ¨ Lightheadedness or sudden weakness. ¨ A fast or irregular heartbeat. After you call 911, the  may tell you to chew 1 adult-strength or 2 to 4 low-dose aspirin. Wait for an ambulance. Do not try to drive yourself. · You have signs of a stroke. These may include:  ¨ Sudden numbness, paralysis, or weakness in your face, arm, or leg, especially on only one side of your body. ¨ New problems with walking or balance. ¨ Sudden vision changes. ¨ Drooling or slurred speech. ¨ New problems speaking or understanding simple statements, or feeling confused. ¨ A sudden, severe headache that is different from past headaches. · You passed out (lost consciousness). Call your doctor now or seek immediate medical care if:  · You have muscle pain or weakness. Watch closely for changes in your health, and be sure to contact your doctor if:  · You are very tired. · You have an upset stomach, gas, constipation, or belly pain or cramps. Where can you learn more? Go to [employer]  Enter C406 in the search box to learn more about \"Hyperlipidemia: After Your Visit. \"   © 6274-9292 Healthwise, Incorporated. Care instructions adapted under license by [facility] (which disclaims liability or warranty for this information). This care instruction is for use with your licensed healthcare professional. If you have questions about a medical condition or this instruction, always ask your healthcare professional. [place] [address] any warranty or liability for your use of this information.   Content Version: 7.1.455728; Last Revised: October 13, 2011 Anxiety Disorder: Care Instructions  Your Care Instructions     Anxiety is a normal reaction to stress. Difficult situations can cause you to have symptoms such as sweaty palms and a nervous feeling. In an anxiety disorder, the symptoms are far more severe. Constant worry, muscle tension, trouble sleeping, nausea and diarrhea, and other symptoms can make normal daily activities difficult or impossible. These symptoms may occur for no reason, and they can affect your work, school, or social life. Medicines, counseling, and self-care can all help. Follow-up care is a key part of your treatment and safety. Be sure to make and go to all appointments, and call your doctor if you are having problems. It's also a good idea to know your test results and keep a list of the medicines you take. How can you care for yourself at home? · Take medicines exactly as directed. Call your doctor if you think you are having a problem with your medicine. · Go to your counseling sessions and follow-up appointments. · Recognize and accept your anxiety. Then, when you are in a situation that makes you anxious, say to yourself, \"This is not an emergency. I feel uncomfortable, but I am not in danger. I can keep going even if I feel anxious. \"  · Be kind to your body:  ? Relieve tension with exercise or a massage. ? Get enough rest.  ? Avoid alcohol, caffeine, nicotine, and illegal drugs. They can increase your anxiety level and cause sleep problems. ? Learn and do relaxation techniques. See below for more about these techniques. · Engage your mind. Get out and do something you enjoy. Go to a funny movie, or take a walk or hike. Plan your day. Having too much or too little to do can make you anxious. · Keep a record of your symptoms. Discuss your fears with a good friend or family member, or join a support group for people with similar problems. Talking to others sometimes relieves stress.   · Get involved in social groups, or volunteer to help others. Being alone sometimes makes things seem worse than they are. · Get at least 30 minutes of exercise on most days of the week to relieve stress. Walking is a good choice. You also may want to do other activities, such as running, swimming, cycling, or playing tennis or team sports. Relaxation techniques  Do relaxation exercises 10 to 20 minutes a day. You can play soothing, relaxing music while you do them, if you wish. · Tell others in your house that you are going to do your relaxation exercises. Ask them not to disturb you. · Find a comfortable place, away from all distractions and noise. · Lie down on your back, or sit with your back straight. · Focus on your breathing. Make it slow and steady. · Breathe in through your nose. Breathe out through either your nose or mouth. · Breathe deeply, filling up the area between your navel and your rib cage. Breathe so that your belly goes up and down. · Do not hold your breath. · Breathe like this for 5 to 10 minutes. Notice the feeling of calmness throughout your whole body. As you continue to breathe slowly and deeply, relax by doing the following for another 5 to 10 minutes:  · Tighten and relax each muscle group in your body. You can begin at your toes and work your way up to your head. · Imagine your muscle groups relaxing and becoming heavy. · Empty your mind of all thoughts. · Let yourself relax more and more deeply. · Become aware of the state of calmness that surrounds you. · When your relaxation time is over, you can bring yourself back to alertness by moving your fingers and toes and then your hands and feet and then stretching and moving your entire body. Sometimes people fall asleep during relaxation, but they usually wake up shortly afterward. · Always give yourself time to return to full alertness before you drive a car or do anything that might cause an accident if you are not fully alert.  Never play a relaxation tape while you drive a car. When should you call for help? Call 911 anytime you think you may need emergency care. For example, call if:    · You feel you cannot stop from hurting yourself or someone else. Keep the numbers for these national suicide hotlines: 0-541-348-TALK (6-683-769-646.310.5649) and 9-808-RPKTVTC (8-382.910.2141). If you or someone you know talks about suicide or feeling hopeless, get help right away. Watch closely for changes in your health, and be sure to contact your doctor if:    · You have anxiety or fear that affects your life.     · You have symptoms of anxiety that are new or different from those you had before. Where can you learn more? Go to http://www.[employer]ill.com/  Enter P754 in the search box to learn more about \"Anxiety Disorder: Care Instructions. \"  Current as of: June 16, 2021               Content Version: 13.2  © 2006-2022 Healthwise, Incorporated. Care instructions adapted under license by [employer] (which disclaims liability or warranty for this information). If you have questions about a medical condition or this instruction, always ask your healthcare professional. [address] any warranty or liability for your use of this information.

## 2022-03-19 PROBLEM — G44.59 OTHER COMPLICATED HEADACHE SYNDROME: Status: ACTIVE | Noted: 2021-04-15

## 2022-03-27 ENCOUNTER — HOSPITAL ENCOUNTER (EMERGENCY)
Age: 52
Discharge: HOME | End: 2022-03-28
Attending: EMERGENCY MEDICINE
Payer: MEDICAID

## 2022-03-27 VITALS
DIASTOLIC BLOOD PRESSURE: 81 MMHG | OXYGEN SATURATION: 100 % | WEIGHT: 191.14 LBS | HEIGHT: 66 IN | SYSTOLIC BLOOD PRESSURE: 117 MMHG | BODY MASS INDEX: 30.72 KG/M2 | HEART RATE: 74 BPM | RESPIRATION RATE: 16 BRPM | TEMPERATURE: 98.2 F

## 2022-03-27 DIAGNOSIS — S39.012A LUMBAR STRAIN, INITIAL ENCOUNTER: Primary | ICD-10-CM

## 2022-03-27 PROCEDURE — 99283 EMERGENCY DEPT VISIT LOW MDM: CPT

## 2022-03-27 NOTE — Clinical Note
[address]  [facility] EMERGENCY DEPT  [address] [id number] [phone]    Work/School Note    Date: 3/27/2022    To Whom It May concern:      Guerrero Huffman was seen and treated today in the emergency room by the following provider(s):  Attending Provider: Hetal Tidwell MD.      Guerrero Huffman is excused from work/school on 03/28/22. She is clear to return to work/school on 03/29/22.         Sincerely,          Clarice Sellers MD

## 2022-03-28 ENCOUNTER — APPOINTMENT (OUTPATIENT)
Dept: GENERAL RADIOLOGY | Age: 52
End: 2022-03-28
Attending: EMERGENCY MEDICINE
Payer: MEDICAID

## 2022-03-28 PROCEDURE — 72100 X-RAY EXAM L-S SPINE 2/3 VWS: CPT

## 2022-03-28 RX ORDER — METHYLPREDNISOLONE 4 MG/1
TABLET ORAL
Qty: 1 DOSE PACK | Refills: 0 | Status: SHIPPED | OUTPATIENT
Start: 2022-03-28 | End: 2022-10-11

## 2022-03-28 RX ORDER — METHOCARBAMOL 500 MG/1
500 TABLET, FILM COATED ORAL 4 TIMES DAILY
Qty: 20 TABLET | Refills: 0 | Status: SHIPPED | OUTPATIENT
Start: 2022-03-28 | End: 2022-04-25

## 2022-03-28 NOTE — ED PROVIDER NOTES
EMERGENCY DEPARTMENT HISTORY AND PHYSICAL EXAM      Date: 3/27/2022  Patient Name: Grant Vargas    History of Presenting Illness     Chief Complaint   Patient presents with    Back Pain     Pt arrived to ED from home states she was vacuuming and head a pop in the center L side of back. History Provided By: Patient    HPI: Grant Vargas, 54 y.o. female with PMHx significant for degenerative disc disease, status post cervical spine steroid injections which resulted in a CSF leak requiring blood patch at [facility], ulcerative colitis, hyperlipidemia presents to the ED with left low back pain that started 2 to 3 days ago. Patient was vacuuming while she was bent over and as she pushed the vacuum she felt a \"pop\" in the left lower back area. Since then she has been having severe pain in that area that is worse with movement and with sitting and better when she stands up straight. Denies any numbness, tingling, weakness. Denies any dysuria, hematuria, urinary frequency, urinary retention, urinary incontinence. Denies any bowel problems. She has been taking Flexeril and Naprosyn with mild relief. States she has also had problems with allergies and has an allergy test scheduled for Wednesday of this week. She would like to avoid any medications that would interfere with her allergy testing. PCP: Barry Gaines NP    No current facility-administered medications on file prior to encounter. Current Outpatient Medications on File Prior to Encounter   Medication Sig Dispense Refill    fexofenadine HCl (ALLEGRA ALLERGY PO) Take  by mouth.  simvastatin (ZOCOR) 20 mg tablet Take 1 Tablet by mouth nightly. 90 Tablet 3    mesalamine DR (ASACOL HD) 800 mg DR tablet       cyclobenzaprine (FLEXERIL) 10 mg tablet Take 1 Tablet by mouth two (2) times a day.  60 Tablet 1    albuterol (PROVENTIL HFA, VENTOLIN HFA, PROAIR HFA) 90 mcg/actuation inhaler Take 1 Puff by inhalation every four (4) hours as needed for Wheezing. 18 g 0    venlafaxine-SR (EFFEXOR-XR) 37.5 mg capsule Take 1 Capsule by mouth daily. (Patient not taking: Reported on 3/8/2022) 30 Capsule 1    acetaminophen (TYLENOL PO) Take 500 mg by mouth as needed.  pseudoephedrine-ibuprofen (Advil Cold and Sinus)  mg tab Take  by mouth. Once daily      OTHER Cervical blood patch-C5-C6  Indications: Spinal headache after epidural steroid injection 1 Units 0    ibuprofen (Motrin IB) 200 mg tablet Take 200 mg by mouth. 1-2 per day      cetirizine (ZYRTEC) 10 mg tablet Take 10 mg by mouth.  (Patient not taking: Reported on 3/8/2022)         Past History     Past Medical History:  Past Medical History:   Diagnosis Date    Arthritis     joints, back,neck    Herniated cervical disc     c4, c6    High cholesterol     Ill-defined condition 2021    spinal fluid leak s/p pain injection    Mononucleosis     Palpitations        Past Surgical History:  Past Surgical History:   Procedure Laterality Date    COLONOSCOPY N/A 2021    COLONOSCOPY/EGD performed by Azra Samayoa MD at [place] [address] N/A 2022    Bernard Salvage will bring [facility] [address] [facility]    nose    HX OTHER SURGICAL  2021    blood patch lumbar    HX OTHER SURGICAL  2022    colorectal flex sig    IR INJ BLOOD PATCH  2021       Family History:  Family History   Problem Relation Age of Onset    Cancer Mother     Heart Disease Father     Breast Cancer Sister 52        Dececased at age 64    Cancer Sister     Breast Cancer Maternal Aunt     Heart Disease Brother     Breast Cancer Maternal Aunt        Social History:  Social History     Tobacco Use    Smoking status: Former Smoker     Quit date: 3/7/2015     Years since quittin.0    Smokeless tobacco: Former User     Quit date: 2015   Vaping Use    Vaping Use: Never used   Substance Use Topics    Alcohol use: Yes     Alcohol/week: 14.0 standard drinks     Types: 14 Cans of beer per week    Drug use: No       Allergies: Allergies   Allergen Reactions    Diamox Sequels [Acetazolamide] Rash    Sulfa (Sulfonamide Antibiotics) Swelling         Review of Systems   Review of Systems   Constitutional: Negative for chills and fever. HENT: Negative. Respiratory: Negative. Cardiovascular: Negative. Gastrointestinal: Negative. Genitourinary: Negative for decreased urine volume, difficulty urinating, dysuria, flank pain and hematuria. Musculoskeletal: Positive for back pain. Negative for myalgias and neck pain. Skin: Negative for rash and wound. Neurological: Negative for syncope, light-headedness and headaches. Psychiatric/Behavioral: Negative for confusion. The patient is nervous/anxious. All other systems reviewed and are negative. Physical Exam    General appearance - well nourished, well appearing, and in no distress  Eyes - pupils equal and reactive, extraocular eye movements intact  ENT - mucous membranes moist, pharynx normal without lesions  Neck - supple, no significant adenopathy; non-tender to palpation  Chest - clear to auscultation, no wheezes, rales or rhonchi; non-tender to palpation  Heart - normal rate and regular rhythm, S1 and S2 normal, no murmurs noted  Abdomen - soft, nontender, nondistended, no masses or organomegaly  Back-tender to palpation left paralumbar musculature, no midline point vertebral tenderness  Musculoskeletal - no joint tenderness, deformity or swelling; normal ROM  Extremities - peripheral pulses normal, no pedal edema  Skin - normal coloration and turgor, no rashes  Neurological - alert, oriented x3, normal speech, no focal findings or movement disorder noted    Diagnostic Study Results     Labs -   No results found for this or any previous visit (from the past 12 hour(s)).     Radiologic Studies -   XR SPINE LUMB 2 OR 3 V   Final Result   No acute process. CT Results  (Last 48 hours)    None        CXR Results  (Last 48 hours)    None            Medical Decision Making   I am the first provider for this patient. I reviewed the vital signs, available nursing notes, past medical history, past surgical history, family history and social history. Vital Signs-Reviewed the patient's vital signs. Patient Vitals for the past 12 hrs:   Temp Pulse Resp BP SpO2   03/27/22 2252 98.2 °F (36.8 °C) 74 16 117/81 100 %       Records Reviewed: Nursing Notes and Old Medical Records    Provider Notes (Medical Decision Making):   Differential diagnosis: Muscle strain, lumbar radiculopathy, degenerative disc disease, muscle spasm  We will obtain x-ray to rule out bony abnormality. Suspect lumbar strain versus radicular pain. ED Course:   Initial assessment performed. The patients presenting problems have been discussed, and they are in agreement with the care plan formulated and outlined with them. I have encouraged them to ask questions as they arise throughout their visit. Progress Notes:   X-ray does not show any acute abnormality, but does show osteophytes and moderate to severe disc space narrowing at L5-S1. Will treat with analgesics and muscle relaxer. Also prescribed Medrol Dosepak but instructed patient not to take it until after her allergy testing on Wednesday if she would like to have accurate testing done. She should follow-up with PCP and will likely need MRI. Disposition:  Discharge home    PLAN:  1. Discharge Medication List as of 3/28/2022  1:38 AM      START taking these medications    Details   methylPREDNISolone (Medrol, Ramesh,) 4 mg tablet As directed, Normal, Disp-1 Dose Pack, R-0      methocarbamoL (Robaxin) 500 mg tablet Take 1 Tablet by mouth four (4) times daily. , Normal, Disp-20 Tablet, R-0         CONTINUE these medications which have NOT CHANGED    Details   fexofenadine HCl (ALLEGRA ALLERGY PO) Take  by mouth., Historical Med      simvastatin (ZOCOR) 20 mg tablet Take 1 Tablet by mouth nightly., Normal, Disp-90 Tablet, R-3      mesalamine DR (ASACOL HD) 800 mg DR tablet Historical Med      cyclobenzaprine (FLEXERIL) 10 mg tablet Take 1 Tablet by mouth two (2) times a day., Normal, Disp-60 Tablet, R-1      albuterol (PROVENTIL HFA, VENTOLIN HFA, PROAIR HFA) 90 mcg/actuation inhaler Take 1 Puff by inhalation every four (4) hours as needed for Wheezing., Normal, Disp-18 g, R-0      venlafaxine-SR (EFFEXOR-XR) 37.5 mg capsule Take 1 Capsule by mouth daily. , Normal, Disp-30 Capsule, R-1      acetaminophen (TYLENOL PO) Take 500 mg by mouth as needed., Historical Med      pseudoephedrine-ibuprofen (Advil Cold and Sinus)  mg tab Take  by mouth. Once daily, Historical Med      OTHER Cervical blood patch-C5-C6  Indications: Spinal headache after epidural steroid injection, Print, Disp-1 Units, R-0      ibuprofen (Motrin IB) 200 mg tablet Take 200 mg by mouth. 1-2 per day, Historical Med      cetirizine (ZYRTEC) 10 mg tablet Take 10 mg by mouth., Historical Med           2. Follow-up Information     Follow up With Specialties Details Why Contact Info    Octavio You NP Nurse Practitioner Schedule an appointment as soon as possible for a visit   [address] [facility]  [phone]      [facility] EMERGENCY DEPT Emergency Medicine  If symptoms worsen [address] [place] [address]  [address] [facility]  [phone]        Return to ED if worse     Diagnosis     Clinical Impression:   1.  Lumbar strain, initial encounter

## 2022-03-29 ENCOUNTER — TELEPHONE (OUTPATIENT)
Dept: INTERNAL MEDICINE CLINIC | Age: 52
End: 2022-03-29

## 2022-03-29 NOTE — TELEPHONE ENCOUNTER
Ok to take muscle relaxer now and steroid after her allergy test is completed. I recommend she follow up with a spine specialist for MRI if her back continues to bother her. The steroid is typically very helpful. She is a patient of Dr. Sabrina Monroe and he sees low back as well so she could follow up with him.

## 2022-03-29 NOTE — TELEPHONE ENCOUNTER
I called the patient and verified them by name and date of birth. Went to [facility] 03.27 for pain. Did an xray and could not do an MRI at that time. Does not know if it is a slipped disc or muscle related. The only thing she did xray wise was to see the alignment. Suggested PCP order MRI. Prescribed steroid & muscle relaxer and wanted to know if it is okay to take that. Took Naproxen and Flexeril on Friday and stopped it on Sunday. Has an allergy test tomorrow morning.

## 2022-03-29 NOTE — TELEPHONE ENCOUNTER
Patient called and pulled a muscle vacuming the floors and went to [facility] and did a xray didn't show everything.  So she wants an orfer put in to have an Mri done

## 2022-03-30 NOTE — TELEPHONE ENCOUNTER
The patient called back and verified their name and date of birth. She wanted to confirm if she should take Robaxin or Flexeril. Knows the Flexeril works. I informed her to continue the Flexeril since she knows it works. If for some reason it does not, call us back and we can go from there. She stated understanding and had no further questions.

## 2022-04-25 ENCOUNTER — VIRTUAL VISIT (OUTPATIENT)
Dept: INTERNAL MEDICINE CLINIC | Age: 52
End: 2022-04-25
Payer: MEDICAID

## 2022-04-25 ENCOUNTER — TELEPHONE (OUTPATIENT)
Dept: INTERNAL MEDICINE CLINIC | Age: 52
End: 2022-04-25

## 2022-04-25 DIAGNOSIS — M51.36 DDD (DEGENERATIVE DISC DISEASE), LUMBAR: ICD-10-CM

## 2022-04-25 DIAGNOSIS — F40.240 CLAUSTROPHOBIA: ICD-10-CM

## 2022-04-25 DIAGNOSIS — M54.50 ACUTE LEFT-SIDED LOW BACK PAIN, UNSPECIFIED WHETHER SCIATICA PRESENT: ICD-10-CM

## 2022-04-25 DIAGNOSIS — M51.36 NARROWING OF LUMBAR INTERVERTEBRAL DISC SPACE: Primary | ICD-10-CM

## 2022-04-25 DIAGNOSIS — F41.9 ANXIETY: Primary | ICD-10-CM

## 2022-04-25 PROCEDURE — 99214 OFFICE O/P EST MOD 30 MIN: CPT | Performed by: INTERNAL MEDICINE

## 2022-04-25 RX ORDER — LORAZEPAM 1 MG/1
1 TABLET ORAL ONCE
Qty: 1 TABLET | Refills: 0 | Status: SHIPPED | OUTPATIENT
Start: 2022-04-25 | End: 2022-04-25

## 2022-04-25 NOTE — TELEPHONE ENCOUNTER
Patient called and said she forgot to mention to Dr Jose Riley that she needs Diazpam sent to the pharmacy before she has her Mri done.

## 2022-04-25 NOTE — PROGRESS NOTES
Chief Complaint   Patient presents with   [facility] Other     wants an MRI     Pt wants to discuss getting an MRI. 3/25/2022 has been out of work would like to see if its a herniated or bulging disk. orthopaedic appointment on Friday. Pt states she was on a steroid for a week and it made it better, but now feeling bad again. 1. Have you been to the ER, urgent care clinic since your last visit? Hospitalized since your last visit? Yes When: ED [facility] ER for pulled back muscle 3/27/2022    2. Have you seen or consulted any other health care providers outside of the [address] [place] [address] since your last visit? Include any pap smears or colon screening.  Yes RGSHANE Hudsno f/u for ulcercolitis

## 2022-04-25 NOTE — PROGRESS NOTES
Deandra Simmons is a 54 y.o. female who was seen by synchronous (real-time) audio-video technology on 4/25/2022 for Other (wants an MRI)        Assessment & Plan:   Diagnoses and all orders for this visit:    1. Narrowing of lumbar intervertebral disc space  -     MRI LUMB SPINE WO CONT; Future    2. DDD (degenerative disc disease), lumbar    3. Acute left-sided low back pain, unspecified whether sciatica present        given sx and now urinary issues warrants MRI - if leg weakness, worsening loss of bladder or new loss of bowel control go to ED  Agree with follow up with ortho scheduled        Subjective:     Was vaccuuming 3/25, bent down to lift rug to vaccuum and felt sharp pain and pop left lower back which radiated to right  Has DDD in upper cervical spine  Has been taking flexeril  Steroids per ER phsyciian  Felt better with steroids  Still has some pain  Now able to walk upright  Wondering about MRI to see more info  Has appt Friday with ortho coming up - Dr. Nicholas Peralta  No leg weakness, no numbness or tingling in legs  No n/v/abd pain  Has some issues with bladder control since injured back - urgency  Now pain localized to left side  Has been doing some PT at home - some stretches  Sparingly taking naproxen  Prior to Admission medications    Medication Sig Start Date End Date Taking? Authorizing Provider   fexofenadine HCl (ALLEGRA ALLERGY PO) Take  by mouth. Yes Provider, Historical   simvastatin (ZOCOR) 20 mg tablet Take 1 Tablet by mouth nightly. 3/8/22  Yes Gaston Jcakson NP   mesalamine DR (ASACOL HD) 800 mg DR tablet  1/25/22  Yes Provider, Historical   cyclobenzaprine (FLEXERIL) 10 mg tablet Take 1 Tablet by mouth two (2) times a day. 2/1/22  Yes Gaston Jackson NP   albuterol (PROVENTIL HFA, VENTOLIN HFA, PROAIR HFA) 90 mcg/actuation inhaler Take 1 Puff by inhalation every four (4) hours as needed for Wheezing.  2/1/22  Yes Gaston Jackson NP   acetaminophen (TYLENOL PO) Take 500 mg by mouth as needed. Yes Provider, Historical   methylPREDNISolone (Medrol, Ramesh,) 4 mg tablet As directed  Patient not taking: Reported on 4/25/2022 3/28/22   Patria Sainz MD   venlafaxine-SR [facility]) 37.5 mg capsule Take 1 Capsule by mouth daily. Patient not taking: Reported on 3/8/2022 2/1/22   Melania Diaz, KYLER   cetirizine (ZYRTEC) 10 mg tablet Take 10 mg by mouth.   Patient not taking: Reported on 3/8/2022    Provider, Historical         ROS    Objective:     Patient-Reported Vitals 4/18/2022   Patient-Reported Weight 185   Patient-Reported Height 5'6\"   Patient-Reported Pulse 82   Patient-Reported Temperature 97.8   Patient-Reported Systolic  462   Patient-Reported Diastolic 76        [INSTRUCTIONS:  \"[x]\" Indicates a positive item  \"[]\" Indicates a negative item  -- DELETE ALL ITEMS NOT EXAMINED]    Constitutional: [x] Appears well-developed and well-nourished [x] No apparent distress      [] Abnormal -     Mental status: [x] Alert and awake  [x] Oriented to person/place/time [x] Able to follow commands    [] Abnormal -     Eyes:   EOM    [x]  Normal    [] Abnormal -   Sclera  [x]  Normal    [] Abnormal -          Discharge [x]  None visible   [] Abnormal -     HENT: [x] Normocephalic, atraumatic  [] Abnormal -   [x] Mouth/Throat: Mucous membranes are moist    External Ears [x] Normal  [] Abnormal -    Neck: [x] No visualized mass [] Abnormal -     Pulmonary/Chest: [x] Respiratory effort normal   [x] No visualized signs of difficulty breathing or respiratory distress        [] Abnormal -      Musculoskeletal:   [] Normal gait with no signs of ataxia         [x] Normal range of motion of neck        [] Abnormal -     Neurological:        [x] No Facial Asymmetry (Cranial nerve 7 motor function) (limited exam due to video visit)          [x] No gaze palsy        [] Abnormal -          Skin:        [x] No significant exanthematous lesions or discoloration noted on facial skin         [] Abnormal - Psychiatric:       [x] Normal Affect [] Abnormal -        [x] No Hallucinations    Other pertinent observable physical exam findings:-        We discussed the expected course, resolution and complications of the diagnosis(es) in detail. Medication risks, benefits, costs, interactions, and alternatives were discussed as indicated. I advised her to contact the office if her condition worsens, changes or fails to improve as anticipated. She expressed understanding with the diagnosis(es) and plan. Yudi Weston, was evaluated through a synchronous (real-time) audio-video encounter. The patient (or guardian if applicable) is aware that this is a billable service, which includes applicable co-pays. Verbal consent to proceed has been obtained. The visit was conducted pursuant to the emergency declaration under the [address] [place] [address], [address] [place] [address] authority and the [employer] General Act. Patient identification was verified, and a caregiver was present when appropriate. The patient was located at home in a state where the provider was licensed to provide care.       Stephie Rosas MD

## 2022-05-05 ENCOUNTER — HOSPITAL ENCOUNTER (OUTPATIENT)
Dept: MRI IMAGING | Age: 52
Discharge: HOME | End: 2022-05-05
Attending: INTERNAL MEDICINE
Payer: MEDICAID

## 2022-05-05 DIAGNOSIS — M51.36 NARROWING OF LUMBAR INTERVERTEBRAL DISC SPACE: ICD-10-CM

## 2022-05-05 PROCEDURE — 72148 MRI LUMBAR SPINE W/O DYE: CPT

## 2022-05-26 ENCOUNTER — TELEPHONE (OUTPATIENT)
Dept: INTERNAL MEDICINE CLINIC | Age: 52
End: 2022-05-26

## 2022-05-26 NOTE — TELEPHONE ENCOUNTER
The patient called and verified their name and date of birth. She wanted to know the results of her MRI. I informed her on the results per Dr. Dunia Urbano. She will have ortho look at the results to answer further questions.

## 2022-06-16 ENCOUNTER — TRANSCRIBE ORDER (OUTPATIENT)
Dept: SCHEDULING | Age: 52
End: 2022-06-16

## 2022-06-16 DIAGNOSIS — M75.51 BURSITIS OF RIGHT SHOULDER: ICD-10-CM

## 2022-06-16 DIAGNOSIS — S46.011A TRAUMATIC INCOMPLETE TEAR OF RIGHT ROTATOR CUFF, INITIAL ENCOUNTER: Primary | ICD-10-CM

## 2022-07-29 ENCOUNTER — TELEPHONE (OUTPATIENT)
Dept: INTERNAL MEDICINE CLINIC | Age: 52
End: 2022-07-29

## 2022-07-29 NOTE — TELEPHONE ENCOUNTER
----- Message from Danielle Bustamante sent at 7/28/2022 12:07 PM EDT -----  Subject: Appointment Request    Reason for Call: Established Patient Appointment needed: New Patient   Request Appointment    QUESTIONS    Reason for appointment request? Requested Provider unavailable - Elaine Carpenter MD     Additional Information for Provider? Patient sees Dr. Leanne Mustafa, but since   she is leaving Pt is requesting to establish Dr. Denver Fulling as PCP.  Pt needs to   update medical history as well.  ---------------------------------------------------------------------------  --------------  Elzbieta Hoang [facility]  [phone]; OK to leave message on voicemail  ---------------------------------------------------------------------------  --------------  SCRIPT ANSWERS  COVID Screen: Mary Appl

## 2022-08-04 ENCOUNTER — HOSPITAL ENCOUNTER (OUTPATIENT)
Dept: MRI IMAGING | Age: 52
Discharge: HOME | End: 2022-08-04
Attending: ORTHOPAEDIC SURGERY
Payer: MEDICAID

## 2022-08-04 DIAGNOSIS — S46.011A TRAUMATIC INCOMPLETE TEAR OF RIGHT ROTATOR CUFF, INITIAL ENCOUNTER: ICD-10-CM

## 2022-08-04 DIAGNOSIS — M75.51 BURSITIS OF RIGHT SHOULDER: ICD-10-CM

## 2022-08-04 PROCEDURE — 73221 MRI JOINT UPR EXTREM W/O DYE: CPT

## 2022-09-19 ENCOUNTER — NURSE TRIAGE (OUTPATIENT)
Dept: OTHER | Facility: CLINIC | Age: 52
End: 2022-09-19

## 2022-09-19 NOTE — TELEPHONE ENCOUNTER
Received call from [place] at [facility] with Red Flag Complaint. Subjective: Caller states \"Have sinus infection symptoms\"     Current Symptoms: Sinus congestion and pain / facial pain  Cough - productive, clear with black specs (used to be a pack a day + smoker)  Ears are congested  Dizzy  Tired    Onset: 1 week ago    Pain Severity: 7/10, sinus pressure    Temperature: Denies fever, some chills and no sweats     What has been tried: Inhaler, Afrin, steam from hot shower    History related to today's reason for call: Problem list reviewed and no identified problems related to today's reason for call    Recommended disposition: Linette Laboy 4282 advice provided, patient verbalizes understanding; denies any other questions or concerns; instructed to call back for any new or worsening symptoms. Will try a VV seeing as she is not currently established    Attention Provider: Thank you for allowing me to participate in the care of your patient. The patient was connected to triage in response to information provided to the [facility]. Please do not respond through this encounter as the response is not directed to a shared pool.       Reason for Disposition   [1] Sinus congestion as part of a cold AND [2] present < 10 days    Protocols used: Sinus Pain or Congestion-ADULT-

## 2022-09-20 ENCOUNTER — NURSE TRIAGE (OUTPATIENT)
Dept: OTHER | Facility: CLINIC | Age: 52
End: 2022-09-20

## 2022-09-20 NOTE — TELEPHONE ENCOUNTER
Received call from [address] at [facility] with Red Flag Complaint. Subjective: Caller states \"I am having a pinch in my chest.\"     Current Symptoms: \"discomfort\" under left arm pit- worse when taking deep breath    Onset: 1 day ago; sudden    Associated Symptoms: Feeling wheezing and using inhaler more often    Pain Severity: 6/10; cramping; constant    Temperature: Denies    What has been tried: inhaler, afrin, cold medicine    Recommended disposition: See in Office Today    Care advice provided, patient verbalizes understanding; denies any other questions or concerns; instructed to call back for any new or worsening symptoms. Patient/Caller agrees with recommended disposition; writer provided warm transfer to Clemens Sandhoff at [facility] for appointment scheduling    Patient agrees to go to nearest [facility] if no available new appointments. Attention Provider: Thank you for allowing me to participate in the care of your patient. The patient was connected to triage in response to information provided to the [facility]. Please do not respond through this encounter as the response is not directed to a shared pool.     Reason for Disposition   All other patients with chest pain (Exception: fleeting chest pain lasting a few seconds)    Protocols used: Chest Pain-ADULT-[place]

## 2022-10-11 ENCOUNTER — OFFICE VISIT (OUTPATIENT)
Dept: PRIMARY CARE CLINIC | Age: 52
End: 2022-10-11
Payer: MEDICAID

## 2022-10-11 VITALS
SYSTOLIC BLOOD PRESSURE: 101 MMHG | BODY MASS INDEX: 28.28 KG/M2 | OXYGEN SATURATION: 95 % | WEIGHT: 176 LBS | HEART RATE: 74 BPM | HEIGHT: 66 IN | RESPIRATION RATE: 18 BRPM | DIASTOLIC BLOOD PRESSURE: 64 MMHG | TEMPERATURE: 98.1 F

## 2022-10-11 DIAGNOSIS — R92.2 DENSE BREAST: Primary | ICD-10-CM

## 2022-10-11 DIAGNOSIS — Z12.31 ENCOUNTER FOR SCREENING MAMMOGRAM FOR MALIGNANT NEOPLASM OF BREAST: ICD-10-CM

## 2022-10-11 DIAGNOSIS — E78.2 MIXED HYPERLIPIDEMIA: ICD-10-CM

## 2022-10-11 DIAGNOSIS — R19.06 MASS OF EPIGASTRIC REGION: ICD-10-CM

## 2022-10-11 PROCEDURE — 99214 OFFICE O/P EST MOD 30 MIN: CPT | Performed by: FAMILY MEDICINE

## 2022-10-11 NOTE — PROGRESS NOTES
Chief Complaint   Patient presents with    Establish Care     1. \"Have you been to the ER, urgent care clinic since your last visit? Hospitalized since your last visit? \" No    2. \"Have you seen or consulted any other health care providers outside of the [address] [place] [address] since your last visit? \" No     3. For patients aged 39-70: Has the patient had a colonoscopy / FIT/ Cologuard? Yes - no Care Gap present      If the patient is female:    4. For patients aged 41-77: Has the patient had a mammogram within the past 2 years? Yes - no Care Gap present      5. For patients aged 21-65: Has the patient had a pap smear?  Yes - no Care Gap present

## 2022-10-11 NOTE — PROGRESS NOTES
HPI     No chief complaint on file. She is a 54 y.o. female who presents to establish care. Pmhx : HLD, lumbar spondylosis, depression and anxiety, R shoulder labral tear, csf leak after a cervical steroid epidural injection. Hx ulcerative colitis. Surghx : lumbar and cervical blood patches. Hx UC, stable, in clinical remission. follows with GI. Hx depressed mood and anxiety. Feels this was exacerbated by health conditions. She had lost her job and home due to health problems. Since Cervical blood patch (almost a year ago) and improved function and improved general health she has been doing much better. She was given venlafaxine and has not yet started this. Now living with a friend or living in her car. Regarding HLD,  on 3/1/22,  on 10/10/22. She started statin in June. Also has noted a cystic lump that seems to come and go and is somewhat tender in epigastric area. Chronic. No recent change but seems to decrease when she looses weight. Establishing Care  - Chronic medical problems:  Past Medical History:   Diagnosis Date    Arthritis     joints, back,neck    Herniated cervical disc 2014    c4, c6    High cholesterol     Ill-defined condition 01/19/2021    spinal fluid leak s/p pain injection    Mononucleosis     Palpitations 2016     - Current medications:   Current Outpatient Medications   Medication Sig    methylPREDNISolone (Medrol, Ramesh,) 4 mg tablet As directed (Patient not taking: Reported on 4/25/2022)    fexofenadine HCl (ALLEGRA ALLERGY PO) Take  by mouth. simvastatin (ZOCOR) 20 mg tablet Take 1 Tablet by mouth nightly. mesalamine DR (ASACOL HD) 800 mg DR tablet     cyclobenzaprine (FLEXERIL) 10 mg tablet Take 1 Tablet by mouth two (2) times a day. albuterol (PROVENTIL HFA, VENTOLIN HFA, PROAIR HFA) 90 mcg/actuation inhaler Take 1 Puff by inhalation every four (4) hours as needed for Wheezing.     venlafaxine-SR (EFFEXOR-XR) 37.5 mg capsule Take 1 Capsule by mouth daily. (Patient not taking: Reported on 3/8/2022)    acetaminophen (TYLENOL PO) Take 500 mg by mouth as needed. cetirizine (ZYRTEC) 10 mg tablet Take 10 mg by mouth. (Patient not taking: Reported on 3/8/2022)     No current facility-administered medications for this visit. - Family history:   Family History   Problem Relation Age of Onset    Cancer Mother     Heart Disease Father     Breast Cancer Sister 52        Dececased at age 64    Cancer Sister     Breast Cancer Maternal Aunt     Heart Disease Brother     Breast Cancer Maternal Aunt      - Allergies: Allergies   Allergen Reactions    Diamox Sequels [Acetazolamide] Rash    Sulfa (Sulfonamide Antibiotics) Swelling     - Surgical history:   Past Surgical History:   Procedure Laterality Date    COLONOSCOPY N/A 2021    COLONOSCOPY/EGD performed by Helen Aceves MD at [place] [address] N/A 2022    [address] will bring performed by Helen Aceves MD at [facility] ENDOSCOPY     [facility]    nose    HX OTHER SURGICAL  2021    blood patch lumbar    HX OTHER SURGICAL  2022    colorectal flex sig    IR INJ BLOOD PATCH  2021     - Social history (sexually active, occupation, smoker, etoh use, etc):   Social History     Socioeconomic History    Marital status: LIFE PARTNER     Spouse name: Not on file    Number of children: Not on file    Years of education: Not on file    Highest education level: Not on file   Occupational History    Not on file   Tobacco Use    Smoking status: Former     Types: Cigarettes     Quit date: 3/7/2015     Years since quittin.6    Smokeless tobacco: Former     Quit date: 2015   Vaping Use    Vaping Use: Never used   Substance and Sexual Activity    Alcohol use:  Yes     Alcohol/week: 14.0 standard drinks     Types: 14 Cans of beer per week    Drug use: No    Sexual activity: Yes     Partners: Female     Birth control/protection: None   Other Topics Concern    Not on file   Social History Narrative    Not on file     Social Determinants of Health     Financial Resource Strain: Not on file   Food Insecurity: Not on file   Transportation Needs: Not on file   Physical Activity: Not on file   Stress: Not on file   Social Connections: Not on file   Intimate Partner Violence: Not on file   Housing Stability: Not on file         Review of Systems  Denies fever, chills, chest pain, shortness of breath, abd pain, nausea, vomiting    Reviewed PmHx, RxHx, FmHx, SocHx, AllgHx and updated and dated in the chart. Physical Exam:  Visit Vitals  /64 (BP 1 Location: Left upper arm, BP Patient Position: Sitting)   Pulse 74   Temp 98.1 °F (36.7 °C) (Oral)   Resp 18   Ht 5' 6\" (1.676 m)   Wt 176 lb (79.8 kg)   SpO2 95%   BMI 28.41 kg/m²     Physical Exam  Vitals reviewed. Constitutional:       Appearance: Normal appearance. HENT:      Head: Normocephalic and atraumatic. Cardiovascular:      Rate and Rhythm: Normal rate and regular rhythm. Pulses: Normal pulses. Heart sounds: Normal heart sounds. Pulmonary:      Effort: Pulmonary effort is normal.      Breath sounds: Normal breath sounds. Abdominal:      Palpations: Abdomen is soft. Comments: Round well circumscribed mass ~2cm diameter in epigastric area. Seems mobile. Nontender. Overlying skin normal.    Musculoskeletal:      Cervical back: Neck supple. No tenderness. Right lower leg: No edema. Left lower leg: No edema. Lymphadenopathy:      Cervical: No cervical adenopathy. Skin:     General: Skin is warm and dry. Neurological:      General: No focal deficit present. Mental Status: She is alert and oriented to person, place, and time. Psychiatric:         Mood and Affect: Mood normal.         Behavior: Behavior normal.         Thought Content: Thought content normal.            Assessment / Plan     Diagnoses and all orders for this visit:    1.  Dense breast  - [facility] 3D [facility]SE [facility]O W MAMMO BI SCREENING INCL CAD; Future    2. Encounter for screening mammogram for malignant neoplasm of breast  -     [facility] 3D [facility] W MAMMO BI SCREENING INCL CAD; Future    3. Mixed hyperlipidemia    4. Mass of epigastric region  -     US ABD COMP; Future   Recent lab results reviewed, cbc, cmp and lipids. Following with GI and Cardio. Referred for counseling to behavioral health. Start venlafaxine. Follow up 1-2 months or sooner prn. She declines flu vaccine today. Obtain medical records    I have discussed the diagnosis with the patient and the intended plan as seen in the above orders. The patient has received an after-visit summary and questions were answered concerning future plans. I have discussed medication side effects and warnings with the patient as well.     Emmanuel Miller, DO

## 2022-10-17 ENCOUNTER — TELEPHONE (OUTPATIENT)
Dept: PRIMARY CARE CLINIC | Age: 52
End: 2022-10-17

## 2022-10-17 ENCOUNTER — TRANSCRIBE ORDER (OUTPATIENT)
Dept: SCHEDULING | Age: 52
End: 2022-10-17

## 2022-10-17 ENCOUNTER — OFFICE VISIT (OUTPATIENT)
Dept: PRIMARY CARE CLINIC | Age: 52
End: 2022-10-17
Payer: MEDICAID

## 2022-10-17 ENCOUNTER — HOSPITAL ENCOUNTER (OUTPATIENT)
Dept: ULTRASOUND IMAGING | Age: 52
Discharge: HOME | End: 2022-10-17
Attending: FAMILY MEDICINE
Payer: MEDICAID

## 2022-10-17 VITALS
WEIGHT: 176.4 LBS | HEIGHT: 66 IN | RESPIRATION RATE: 16 BRPM | DIASTOLIC BLOOD PRESSURE: 75 MMHG | OXYGEN SATURATION: 95 % | HEART RATE: 71 BPM | SYSTOLIC BLOOD PRESSURE: 114 MMHG | TEMPERATURE: 97.3 F | BODY MASS INDEX: 28.35 KG/M2

## 2022-10-17 DIAGNOSIS — Z12.31 ENCOUNTER FOR SCREENING MAMMOGRAM FOR BREAST CANCER: ICD-10-CM

## 2022-10-17 DIAGNOSIS — R19.06 MASS OF EPIGASTRIC REGION: ICD-10-CM

## 2022-10-17 DIAGNOSIS — Z80.3 FAMILY HISTORY OF BREAST CANCER: ICD-10-CM

## 2022-10-17 DIAGNOSIS — N63.32 MASS OF AXILLARY TAIL OF LEFT BREAST: Primary | ICD-10-CM

## 2022-10-17 DIAGNOSIS — Z12.31 ENCOUNTER FOR SCREENING MAMMOGRAM FOR MALIGNANT NEOPLASM OF BREAST: ICD-10-CM

## 2022-10-17 PROCEDURE — 99213 OFFICE O/P EST LOW 20 MIN: CPT | Performed by: FAMILY MEDICINE

## 2022-10-17 PROCEDURE — 76705 ECHO EXAM OF ABDOMEN: CPT

## 2022-10-17 NOTE — TELEPHONE ENCOUNTER
Patient called in and wanted to tell Joselito Saucedo that the 3d scan that patient wanted to have done, the insurance wont cover it, Her mammogram specialist said its better to have a diagnostic mammogram done. Patient said they wanted to have the paper work in as soon as possible, Joselito Saucedo can call it over the phone and they will accepted it that way to. Patient said that Joselito Saucedo has all the paper work.      Patient phone number [phone]

## 2022-10-17 NOTE — PROGRESS NOTES
Chief Complaint   Patient presents with    Breast Problem     Patient needs a new breast mammogram order.   Visit Vitals  /75 (BP 1 Location: Left upper arm, BP Patient Position: Sitting, BP Cuff Size: Large adult)   Pulse 71   Temp 97.3 °F (36.3 °C) (Temporal)   Resp 16   Ht 5' 6\" (1.676 m)   Wt 176 lb 6.4 oz (80 kg)   SpO2 95%   BMI 28.47 kg/m²

## 2022-10-17 NOTE — PROGRESS NOTES
Dale Cisse is an 54 y.o. female who presents for breast lump. C/o recently noted an area in right axillary breast area that feels a little tender and nodular. No other acute complaints. Allergies - reviewed: Allergies   Allergen Reactions    Diamox Sequels [Acetazolamide] Rash    Sulfa (Sulfonamide Antibiotics) Swelling         Medications - reviewed:   Current Outpatient Medications   Medication Sig    fexofenadine HCl (ALLEGRA ALLERGY PO) Take  by mouth. simvastatin (ZOCOR) 20 mg tablet Take 1 Tablet by mouth nightly. mesalamine DR (ASACOL HD) 800 mg DR tablet     cyclobenzaprine (FLEXERIL) 10 mg tablet Take 1 Tablet by mouth two (2) times a day. albuterol (PROVENTIL HFA, VENTOLIN HFA, PROAIR HFA) 90 mcg/actuation inhaler Take 1 Puff by inhalation every four (4) hours as needed for Wheezing. venlafaxine-SR (EFFEXOR-XR) 37.5 mg capsule Take 1 Capsule by mouth daily. (Patient not taking: No sig reported)    acetaminophen (TYLENOL PO) Take 500 mg by mouth as needed. No current facility-administered medications for this visit.          Past Medical History - reviewed:  Past Medical History:   Diagnosis Date    Arthritis     joints, back,neck    Herniated cervical disc 2014    c4, c6    High cholesterol     Ill-defined condition 01/19/2021    spinal fluid leak s/p pain injection    Mononucleosis     Palpitations 2016         Past Surgical History - reviewed:   Past Surgical History:   Procedure Laterality Date    COLONOSCOPY N/A 8/18/2021    COLONOSCOPY/EGD performed by Yary Pelayo MD at [address] 1/31/2022    Tiara Charles will bring performed by Yary Pelayo MD at [address] [facility],  [address]    nose    HX OTHER SURGICAL  04/29/2021    blood patch lumbar    HX OTHER SURGICAL  01/31/2022    colorectal flex sig    IR INJ BLOOD PATCH  4/29/2021         Social History - reviewed:  Social History     Socioeconomic History Marital status: LIFE PARTNER     Spouse name: Not on file    Number of children: Not on file    Years of education: Not on file    Highest education level: Not on file   Occupational History    Not on file   Tobacco Use    Smoking status: Former     Types: Cigarettes     Quit date: 3/7/2015     Years since quittin.6    Smokeless tobacco: Former     Quit date: 2015   Vaping Use    Vaping Use: Never used   Substance and Sexual Activity    Alcohol use:  Yes     Alcohol/week: 14.0 standard drinks     Types: 14 Cans of beer per week    Drug use: No    Sexual activity: Yes     Partners: Female     Birth control/protection: None   Other Topics Concern    Not on file   Social History Narrative    Not on file     Social Determinants of Health     Financial Resource Strain: High Risk    Difficulty of Paying Living Expenses: Very hard   Food Insecurity: Food Insecurity Present    Worried About Running Out of Food in the Last Year: Sometimes true    Ran Out of Food in the Last Year: Sometimes true   Transportation Needs: Not on file   Physical Activity: Sufficiently Active    Days of Exercise per Week: 4 days    Minutes of Exercise per Session: 120 min   Stress: Not on file   Social Connections: Not on file   Intimate Partner Violence: Not At Risk    Fear of Current or Ex-Partner: No    Emotionally Abused: No    Physically Abused: No    Sexually Abused: No   Housing Stability: Not on file         Family History - reviewed:  Family History   Problem Relation Age of Onset    Cancer Mother     Heart Disease Father     Breast Cancer Sister 52        Dececased at age 64    Cancer Sister     Breast Cancer Maternal Aunt     Heart Disease Brother     Breast Cancer Maternal Aunt          Immunizations - reviewed:   Immunization History   Administered Date(s) Administered    COVID-19, PFIZER PURPLE top, DILUTE for use, (age 15 y+), IM, 30mcg/0.3mL 10/08/2021, 10/29/2021    Hib 2020    Tdap 2016 ROS  CONSTITUTIONAL: no fever or weight loss. CARDIOVASCULAR: Denies: chest pain, orthopnea, paroxysmal nocturnal dyspnea  RESPIRATORY: Denies: shortness of breath, pleuritic chest pain      Physical Exam  Visit Vitals  /75 (BP 1 Location: Left upper arm, BP Patient Position: Sitting, BP Cuff Size: Large adult)   Pulse 71   Temp 97.3 °F (36.3 °C) (Temporal)   Resp 16   Ht 5' 6\" (1.676 m)   Wt 176 lb 6.4 oz (80 kg)   SpO2 95%   BMI 28.47 kg/m²       General appearance - alert, well appearing, and in no distress  Breast exam :   Right breast exam : normal. No masses appreciated. Skin appears normal.  Left breast exam : nodular and mildly tender density in left axillary breast tissue. Overlying skin normal appearing. Nipples appear normal.    Assessment/Plan    ICD-10-CM ICD-9-CM    1. Mass of axillary tail of left breast  N63.32 611.72 [facility] 3D [facility]SE [facility]O W MAMMO LT DX INCL CAD      [facility] 3D [facility] W MAMMO RT SCREENING INCL CAD      2. Encounter for screening mammogram for malignant neoplasm of breast  Z12.31 V76.12 [facility] 3D [facility] W MAMMO LT DX INCL CAD      [facility] 3D [facility] W MAMMO RT SCREENING INCL CAD      3. Family history of breast cancer  Z80.3 V16.3 [facility] 3D [facility] W MAMMO LT DX INCL CAD      [facility] 3D JOSE [facility]FR[facility]O W MAMMO RT SCREENING INCL CAD                  I have discussed the diagnosis with the patient and the intended plan as seen in the above orders. Patient verbalized understanding of the plan and agrees with the plan. I have discussed medication side effects and warnings with the patient as well. Informed patient to return to the office if new symptoms arise.         Erroll Poag, DO

## 2022-10-28 ENCOUNTER — HOSPITAL ENCOUNTER (OUTPATIENT)
Dept: MAMMOGRAPHY | Age: 52
Discharge: HOME | End: 2022-10-28
Attending: FAMILY MEDICINE
Payer: MEDICAID

## 2022-10-28 DIAGNOSIS — N63.32 MASS OF AXILLARY TAIL OF LEFT BREAST: ICD-10-CM

## 2022-10-28 DIAGNOSIS — Z80.3 FAMILY HISTORY OF BREAST CANCER: ICD-10-CM

## 2022-10-28 DIAGNOSIS — Z12.31 ENCOUNTER FOR SCREENING MAMMOGRAM FOR BREAST CANCER: ICD-10-CM

## 2022-10-28 PROCEDURE — 76642 ULTRASOUND BREAST LIMITED: CPT

## 2022-10-28 PROCEDURE — 77061 BREAST TOMOSYNTHESIS UNI: CPT

## 2022-11-10 ENCOUNTER — TRANSCRIBE ORDER (OUTPATIENT)
Dept: SCHEDULING | Age: 52
End: 2022-11-10

## 2022-11-10 DIAGNOSIS — S46.012A TRAUMATIC COMPLETE TEAR OF LEFT ROTATOR CUFF, INITIAL ENCOUNTER: Primary | ICD-10-CM

## 2022-11-10 DIAGNOSIS — M25.512 ACUTE PAIN OF LEFT SHOULDER: ICD-10-CM

## 2022-11-16 ENCOUNTER — HOSPITAL ENCOUNTER (OUTPATIENT)
Dept: MRI IMAGING | Age: 52
Discharge: HOME | End: 2022-11-16
Attending: ORTHOPAEDIC SURGERY
Payer: MEDICAID

## 2022-11-16 DIAGNOSIS — S46.012A TRAUMATIC COMPLETE TEAR OF LEFT ROTATOR CUFF, INITIAL ENCOUNTER: ICD-10-CM

## 2022-11-16 DIAGNOSIS — M25.512 ACUTE PAIN OF LEFT SHOULDER: ICD-10-CM

## 2022-11-16 PROCEDURE — 73221 MRI JOINT UPR EXTREM W/O DYE: CPT

## 2023-02-02 ENCOUNTER — OFFICE VISIT (OUTPATIENT)
Dept: PRIMARY CARE CLINIC | Age: 53
End: 2023-02-02
Payer: MEDICAID

## 2023-02-02 VITALS
RESPIRATION RATE: 16 BRPM | SYSTOLIC BLOOD PRESSURE: 106 MMHG | OXYGEN SATURATION: 95 % | DIASTOLIC BLOOD PRESSURE: 76 MMHG | HEIGHT: 66 IN | WEIGHT: 173 LBS | BODY MASS INDEX: 27.8 KG/M2 | HEART RATE: 67 BPM | TEMPERATURE: 97.1 F

## 2023-02-02 DIAGNOSIS — N63.21 MASS OF UPPER OUTER QUADRANT OF LEFT BREAST: Primary | ICD-10-CM

## 2023-02-02 DIAGNOSIS — E78.2 MIXED HYPERLIPIDEMIA: ICD-10-CM

## 2023-02-02 DIAGNOSIS — N63.11 MASS OF UPPER OUTER QUADRANT OF RIGHT BREAST: ICD-10-CM

## 2023-02-02 DIAGNOSIS — R73.02 IGT (IMPAIRED GLUCOSE TOLERANCE): ICD-10-CM

## 2023-02-02 DIAGNOSIS — K51.919 ULCERATIVE COLITIS WITH COMPLICATION, UNSPECIFIED LOCATION (HCC): ICD-10-CM

## 2023-02-02 PROBLEM — K51.90 ULCERATIVE COLITIS (HCC): Status: ACTIVE | Noted: 2023-02-02

## 2023-02-02 PROCEDURE — 99214 OFFICE O/P EST MOD 30 MIN: CPT | Performed by: FAMILY MEDICINE

## 2023-02-02 RX ORDER — ROSUVASTATIN CALCIUM 20 MG/1
20 TABLET, COATED ORAL
COMMUNITY

## 2023-02-02 NOTE — PROGRESS NOTES
Subjective Nelwyn Schirmer is an 50 y.o. female who presents for follow up. Pmhx : HLD, UC    Left breast mass evaluated with imaging 10/28/22. Imaging was benign. She has noticed a 'fibrous' feeling in right axillary breast. Noticed this a few months ago. Left breast mass is unchanged. Fhx of cardiac disease. Personal hx of HLD. She is now on crestor. Following with cardiology. She's had a negative stress test abotu 11/2022. UC, following with GI. Allergies - reviewed: Allergies   Allergen Reactions    Diamox Sequels [Acetazolamide] Rash    Sulfa (Sulfonamide Antibiotics) Swelling         Medications - reviewed:   Current Outpatient Medications   Medication Sig    rosuvastatin (CRESTOR) 20 mg tablet Take 20 mg by mouth nightly. fexofenadine HCl (ALLEGRA ALLERGY PO) Take  by mouth.    mesalamine DR (ASACOL HD) 800 mg DR tablet     cyclobenzaprine (FLEXERIL) 10 mg tablet Take 1 Tablet by mouth two (2) times a day. albuterol (PROVENTIL HFA, VENTOLIN HFA, PROAIR HFA) 90 mcg/actuation inhaler Take 1 Puff by inhalation every four (4) hours as needed for Wheezing. No current facility-administered medications for this visit.          Past Medical History - reviewed:  Past Medical History:   Diagnosis Date    Arthritis     joints, back,neck    Herniated cervical disc 2014    c4, c6    High cholesterol     Ill-defined condition 01/19/2021    spinal fluid leak s/p pain injection    Mononucleosis     Palpitations 2016         Past Surgical History - reviewed:   Past Surgical History:   Procedure Laterality Date    COLONOSCOPY N/A 8/18/2021    COLONOSCOPY/EGD performed by Francesco Cruz MD at [address] 1/31/2022    Geoffery Memos will bring performed by Francesco Cruz MD at [address] [facility] [address]    nose    HX OTHER SURGICAL  04/29/2021    blood patch lumbar    HX OTHER SURGICAL  01/31/2022    colorectal flex sig    IR INJ BLOOD PATCH 2021         Social History - reviewed:  Social History     Socioeconomic History    Marital status: LIFE PARTNER     Spouse name: Not on file    Number of children: Not on file    Years of education: Not on file    Highest education level: Not on file   Occupational History    Not on file   Tobacco Use    Smoking status: Former     Types: Cigarettes     Quit date: 3/7/2015     Years since quittin.9    Smokeless tobacco: Former     Quit date: 2015   Vaping Use    Vaping Use: Never used   Substance and Sexual Activity    Alcohol use: Yes     Alcohol/week: 14.0 standard drinks     Types: 14 Cans of beer per week    Drug use: No    Sexual activity: Yes     Partners: Female     Birth control/protection: None   Other Topics Concern    Not on file   Social History Narrative    Not on file     Social Determinants of Health     Financial Resource Strain: High Risk    Difficulty of Paying Living Expenses: Very hard   Food Insecurity: Food Insecurity Present    Worried About Running Out of Food in the Last Year: Sometimes true    Ran Out of Food in the Last Year: Sometimes true   Transportation Needs: Not on file   Physical Activity: Sufficiently Active    Days of Exercise per Week: 4 days    Minutes of Exercise per Session: 120 min   Stress: Not on file   Social Connections: Not on file   Intimate Partner Violence: Not At Risk    Fear of Current or Ex-Partner: No    Emotionally Abused: No    Physically Abused: No    Sexually Abused: No   Housing Stability: Not on file         Family History - reviewed:  Family History   Problem Relation Age of Onset    Cancer Mother     Heart Disease Father     Breast Cancer Sister 52        Dececased at age 64    Cancer Sister     Breast Cancer Maternal Aunt     Heart Disease Brother     Breast Cancer Maternal Aunt            ROS  CONSTITUTIONAL: Denies fever, chills, unintentional weight loss. CARDIOVASCULAR: Denies chest pain, orthopnea, PND.   RESPIRATORY: Denies cough, dyspnea, wheezing, hemoptysis. GI: Denies abdominal pain, diarrhea, constipation, diarrhea, black or bloody stool. Physical Exam  Visit Vitals  /76 (BP 1 Location: Left upper arm, BP Patient Position: Sitting, BP Cuff Size: Small adult)   Pulse 67   Temp 97.1 °F (36.2 °C) (Temporal)   Resp 16   Ht 5' 6\" (1.676 m)   Wt 173 lb (78.5 kg)   SpO2 95%   BMI 27.92 kg/m²     Physical Exam  Vitals reviewed. Constitutional:       Appearance: Normal appearance. Cardiovascular:      Rate and Rhythm: Normal rate and regular rhythm. Pulses: Normal pulses. Heart sounds: Normal heart sounds. Pulmonary:      Effort: Pulmonary effort is normal.      Breath sounds: Normal breath sounds. Chest:   Breasts:     Right: No nipple discharge or skin change. Left: No nipple discharge or skin change. Comments: Left breast with density in axillary tail, irregular, mobile, soft. Right breast with density at 10 o'clock, oval, mobile, soft. Musculoskeletal:      Cervical back: Neck supple. No tenderness. Right lower leg: No edema. Lymphadenopathy:      Cervical: No cervical adenopathy. Upper Body:      Right upper body: No supraclavicular or axillary adenopathy. Left upper body: No supraclavicular or axillary adenopathy. Neurological:      Mental Status: She is alert. Assessment/Plan  Diagnoses and all orders for this visit:    1. Mass of upper outer quadrant of left breast  -     [facility] MAMMO LT DX INCL CAD; Future    2. Mixed hyperlipidemia  -     METABOLIC PANEL, COMPREHENSIVE; Future  -     LIPID PANEL; Future    3. Ulcerative colitis with complication, unspecified location ([facility] [address]  -     CBC WITH AUTOMATED DIFF; Future    4. Mass of upper outer quadrant of right breast  -     [facility] MAMMO RT DX INCL CAD; Future    5. IGT (impaired glucose tolerance)  -     HEMOGLOBIN A1C WITH EAG; Future    Pt prefers to schedule imaging. Clinical follow up 1-2 months.  Seek care if condition changes. I have discussed the diagnosis with the patient and the intended plan as seen in the above orders. Patient verbalized understanding of the plan and agrees with the plan. Informed patient to return to the office if new symptoms arise.         Serenity Angel, DO

## 2023-02-02 NOTE — PROGRESS NOTES
Chief Complaint   Patient presents with    Breast Problem     Wants right breast checked     Nasal Congestion     Visit Vitals  /76 (BP 1 Location: Left upper arm, BP Patient Position: Sitting, BP Cuff Size: Small adult)   Pulse 67   Temp 97.1 °F (36.2 °C) (Temporal)   Resp 16   Ht 5' 6\" (1.676 m)   Wt 173 lb (78.5 kg)   SpO2 95%   BMI 27.92 kg/m²

## 2023-02-14 ENCOUNTER — TRANSCRIBE ORDER (OUTPATIENT)
Dept: SCHEDULING | Age: 53
End: 2023-02-14

## 2023-02-14 ENCOUNTER — TELEPHONE (OUTPATIENT)
Dept: PRIMARY CARE CLINIC | Age: 53
End: 2023-02-14

## 2023-02-14 DIAGNOSIS — N63.21 MASS OF UPPER OUTER QUADRANT OF LEFT BREAST: Primary | ICD-10-CM

## 2023-02-14 DIAGNOSIS — N63.11 MASS OF UPPER OUTER QUADRANT OF RIGHT BREAST: ICD-10-CM

## 2023-02-28 ENCOUNTER — OFFICE VISIT (OUTPATIENT)
Dept: NEUROLOGY | Age: 53
End: 2023-02-28
Payer: MEDICAID

## 2023-02-28 VITALS
SYSTOLIC BLOOD PRESSURE: 116 MMHG | BODY MASS INDEX: 27.48 KG/M2 | RESPIRATION RATE: 18 BRPM | DIASTOLIC BLOOD PRESSURE: 82 MMHG | OXYGEN SATURATION: 98 % | HEIGHT: 66 IN | HEART RATE: 78 BPM | TEMPERATURE: 98.2 F | WEIGHT: 171 LBS

## 2023-02-28 DIAGNOSIS — G96.02 SPINAL CEREBROSPINAL FLUID LEAK, SPONTANEOUS: Primary | ICD-10-CM

## 2023-02-28 PROCEDURE — 99215 OFFICE O/P EST HI 40 MIN: CPT | Performed by: INTERNAL MEDICINE

## 2023-02-28 NOTE — PROGRESS NOTES
Chief Complaint   Patient presents with    Follow-up     [facility] leak due to a puncture( during a steroid injection) -still having extreme headache - vision and hearing issues - dizziness - states Dr Garcia Presumcatina was going to do another MRI with and without contrast   Feels like a person that spins around for a bit then opens her eyes ( that feeling afterwards)     1. Have you been to the ER, urgent care clinic since your last visit? Hospitalized since your last visit? Yes [facility] ER for low back pain     2. Have you seen or consulted any other health care providers outside of the [address] [place] [address] since your last visit? Include any pap smears or colon screening.   No

## 2023-02-28 NOTE — PROGRESS NOTES
Neurology Note    Patient ID:  Alireza Mandel  [id number]  50 y.o.  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 The patient is receiving care from Dr. Rafael Hammer at [facility] and has had a cervical blood patch which has seemed to help with her symptoms about 70%. She still has some residual deficits such as some cognitive and memory issues as well as dizziness and fatigue. Sometimes she can also feel diaphoretic and feverish. Recommendations:  -Would recommend continuing follow-up with Dr. Rafael Hammer at [facility]  - We will obtain MRI of the cervical spine as well as MRI of the brain with and without contrast to evaluate for CSF leak. Problem was discussed at length with the patient. We reviewed the results of the study in detail. We also discussed prognosis and treatment options. The patient had opportunity today to ask all questions, expressed understanding of the instructions provided, and agreed with the plan of treatment. Follow up in 6 to 8 months. I spent 60 minutes providing care to this patient, reviewing the patient's chart, notes, labs, medications and preparing documentation along with >50% of the time spent counseling patient during the encounter. History of Present Illness:   Alireza Mandel is a 50 y.o. female with history of arthritis, cervical spondylosis, hyperlipidemia, cervical spinal fluid leak s/p MAL, presenting as a follow-up for spinal leak. Patient was a former patient of Dr. Juan David Crenshaw. The patient has been seen by Dr. Rafael Hammer at [facility] for cervical spinal blood patch and has found that her symptoms have improved to about 70%. She still has some occasional dizziness lightheadedness fevers and diaphoresis as well as memory issues and cognitive issues. She is seeing a GI physician for her issues with digestion and ulcerative colitis.     She has no new neurological issues today. She has had an MRI of the cervical spine without contrast in 2022 which showed CSF within the dorsal epidural space at the level of C3-T1 on the axial images which raises concern for CSF leak. She also has moderate degenerative changes of the cervical spine most pronounced at C5-C6 with right central disc protrusion and severe right neuroforaminal narrowing. Past Medical History:   Diagnosis Date    Arthritis     joints, back,neck    Herniated cervical disc     c4, c6    High cholesterol     Ill-defined condition 2021    spinal fluid leak s/p pain injection    Mononucleosis     Palpitations         Past Surgical History:   Procedure Laterality Date    COLONOSCOPY N/A 2021    COLONOSCOPY/EGD performed by Neymar Hernandes MD at [place] [address] N/A 2022    Deyanne Valentin will bring performed by Neymar Hernandes MD at [address] [place] [address] [address]    nose    HX OTHER SURGICAL  2021    blood patch lumbar    HX OTHER SURGICAL  2022    colorectal flex sig    IR INJ BLOOD PATCH  2021        Family History   Problem Relation Age of Onset    Cancer Mother     Heart Disease Father     Breast Cancer Sister 52        Dececased at age 64    Cancer Sister     Breast Cancer Maternal Aunt     Heart Disease Brother     Breast Cancer Maternal Aunt         Social History     Tobacco Use    Smoking status: Former     Types: Cigarettes     Quit date: 3/7/2015     Years since quittin.9    Smokeless tobacco: Former     Quit date: 2015   Substance Use Topics    Alcohol use: Yes     Alcohol/week: 14.0 standard drinks     Types: 14 Cans of beer per week        Allergies   Allergen Reactions    Diamox Sequels [Acetazolamide] Rash    Sulfa (Sulfonamide Antibiotics) Swelling        Prior to Admission medications    Medication Sig Start Date End Date Taking?  Authorizing Provider   rosuvastatin (CRESTOR) 20 mg tablet Take 20 mg by mouth nightly. Provider, Historical   fexofenadine HCl (ALLEGRA ALLERGY PO) Take  by mouth. Provider, Historical   mesalamine DR (ASACOL HD) 800 mg DR tablet  1/25/22   Provider, Historical   cyclobenzaprine (FLEXERIL) 10 mg tablet Take 1 Tablet by mouth two (2) times a day. 2/1/22   Erika Carvalho NP   albuterol (PROVENTIL HFA, VENTOLIN HFA, PROAIR HFA) 90 mcg/actuation inhaler Take 1 Puff by inhalation every four (4) hours as needed for Wheezing. 2/1/22   Erika Carvalho NP       Review of Systems:    General, constitutional: negative  Eyes, vision: negative  Ears, nose, throat: negative  Cardiovascular, heart: negative  Respiratory: negative  Gastrointestinal: negative  Genitourinary: negative  Musculoskeletal: negative  Skin and integumentary: negative  Psychiatric: negative  Endocrine: negative  Neurological: negative, except for HPI  Hematologic/lymphatic: negative  Allergy/immunology: negative    []Unable to obtain  ROS due to  []mental status change  []sedated   []intubated    Objective: There were no vitals taken for this visit. Physical Exam:  General:  appears well nourished in no acute distress  Neck: no obvious deformity or masses  Lungs: comfortable on room air  Heart:  well-perfused   Lower extremity: no edema  Skin: intact    Neurological exam:  Awake, alert, oriented to person, place and time  Recent and remote memory were normal  Attention and concentration were intact  Language was intact. There was no aphasia  Speech: no dysarthria  Fund of knowledge was preserved    Cranial nerves:   II-XII were tested    PERRRLA  Visual fields were full to finger counting   EOMI, no evidence of nystagmus  Facial sensation:  normal and symmetric  Facial motor: normal and symmetric  Hearing intact  SCM strength intact  Tongue: midline without fasciculations    Motor: Tone normal in upper and lower extremities     Strength testing:   deltoid triceps biceps Wrist ext. Wrist flex. intrinsics Hip flex. Hip ext. Knee ext. Knee flex Dorsi flex Plantar flex   Right 5 5 5 5 5 5 5 NT 5 5 5 5   Left 5 5 5 5 5 5 5 NT 5 5 5 5       Sensory:  Intact to LT throughout       Cerebellar testing:  no tremor apparent, finger/nose and rapid alternating movements were intact      Gait: steady. Labs:     Lab Results   Component Value Date/Time    Hemoglobin A1c 5.7 (H) 02/16/2023 02:06 PM    Sodium 137 02/16/2023 02:06 PM    Potassium 4.6 02/16/2023 02:06 PM    Chloride 101 02/16/2023 02:06 PM    Glucose 101 (H) 02/16/2023 02:06 PM    BUN 9 02/16/2023 02:06 PM    Creatinine 0.78 02/16/2023 02:06 PM    Calcium 9.6 02/16/2023 02:06 PM    WBC 5.9 02/16/2023 02:06 PM    HCT 42.1 02/16/2023 02:06 PM    HGB 14.2 02/16/2023 02:06 PM    PLATELET 943 29/08/5422 02:06 PM       Imaging:    Results from [place] encounter on 11/16/22    MRI SHOULDER LT WO CONT    Narrative  EXAM: MRI SHOULDER LT WO CONT    INDICATION: Pain. COMPARISON: None    TECHNIQUE: Axial proton density fat-saturated; oblique coronal T1, T2  fat-saturated, and proton density fat-saturated; and oblique sagittal T2  fat-saturated MRI of the left shoulder . CONTRAST: None. FINDINGS: A.C. joint: Mild osteoarthrosis. Anterior acromion process type: 2,  without substantial lateral downsloping or subacromial spurring. Bone marrow: Mild subchondral edema of the anterior acromion. Bone signal  otherwise normal. No acute fracture, dislocation, or marrow replacing process. Joint fluid: Moderate effusion of glenohumeral joint. No effusion of subacromial  subdeltoid bursa or AC joint. Rotator cuff tendons: There is diffuse rotator cuff tendinopathy. No  full-thickness or substantial partial-thickness tendon tear demonstrated. Biceps tendon: Intact origin. Grade 1 intracapsular tendinopathy. Anatomic  extracapsular location. Muscles: No edema or atrophy.     Glenoid labrum: Tearing within superior and posterosuperior labrum. Glenohumeral joint capsule: There is posterior superior subluxation of the  humeral head. No capsular disruption is suggested. Glenohumeral articular cartilage: Mild appearance of glenohumeral  osteoarthrosis. Soft tissue mass: None. Impression  1. Rotator cuff tendinopathy without demonstration of full-thickness or  partial-thickness tendon tearing. No muscle edema or atrophy. 2. Grade 1 intracapsular tendinopathy of long head biceps tendon. 3. Superior and posterosuperior labral tearing. Posterior superior subluxation  of the humeral head. 4. Mild glenohumeral and AC joint osteoarthrosis. Type II acromion. 5. Moderate glenohumeral joint effusion. Results from [place] encounter on 04/19/21    CTA HEAD NECK W CONT    Addendum 4/19/2021 12:07 PM  Addendum:  It is noted that the left vertebral artery arises strictly from the aortic arch,  a normal anatomic variant. 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 Postprocessing was performed to include MIP and reformatted  images. Standard images of the head were also obtained following contrast  administration and a delay. CT dose reduction was achieved through the use of a standardized protocol  tailored for this examination and automatic exposure control for dose  modulation. FINDINGS:    HEAD CT:  The ventricles are normal in size and midline. .  There is no intracranial  hemorrhage or evolving infarct. . There is no abnormal enhancement. . .    CTA NECK:    Great vessels: Normal arch anatomy with the origins patent.     Right subclavian artery: Patent    Left subclavian artery: Patent    Right common carotid artery: Patent    Left common carotid artery: Patent    Cervical right internal carotid artery: Patent with no significant stenosis by  NASCET criteria. Cervical left internal carotid artery: Patent with no significant stenosis by  NASCET criteria. Right vertebral artery: Patent    Left vertebral artery: Patent    The lung apices are clear. The thyroid is homogeneous. No cervical  lymphadenopathy. There are degenerative changes in the spine. CTA HEAD:    Right cavernous internal carotid artery: Patent    Left cavernous internal carotid artery: Patent    Anterior cerebral arteries: Patent    Anterior communicating artery: Patent    Right middle cerebral artery: Patent    Left middle cerebral artery: Patent    Posterior cerebral arteries: Patent    Posterior communicating arteries: Patent. Small on the right. Basilar artery: Patent    Distal vertebral arteries: Bilaterally patent, with a dominant right vertebral  artery. No dissection. No aneurysms. Normal patency of the major venous sinuses and deep venous system. Impression  1. Negative CT angiography of the neck and head.              Patient Active Problem List   Diagnosis Code    Other complicated headache syndrome G44.59    Mixed hyperlipidemia E78.2    Ulcerative colitis ([facility] K51.90                   Signed By:   Silva Santos DO  Neurophysiology      February 28, 2023

## 2023-03-07 ENCOUNTER — HOSPITAL ENCOUNTER (OUTPATIENT)
Dept: MAMMOGRAPHY | Age: 53
Discharge: HOME | End: 2023-03-07
Attending: FAMILY MEDICINE
Payer: MEDICAID

## 2023-03-07 DIAGNOSIS — N63.21 MASS OF UPPER OUTER QUADRANT OF LEFT BREAST: ICD-10-CM

## 2023-03-07 DIAGNOSIS — N63.11 MASS OF UPPER OUTER QUADRANT OF RIGHT BREAST: ICD-10-CM

## 2023-03-07 PROCEDURE — 77062 BREAST TOMOSYNTHESIS BI: CPT

## 2023-03-07 PROCEDURE — 76642 ULTRASOUND BREAST LIMITED: CPT

## 2023-03-21 DIAGNOSIS — F41.9 ANXIETY: Primary | ICD-10-CM

## 2023-03-21 RX ORDER — DIAZEPAM 2 MG/1
5 TABLET ORAL AS NEEDED
Qty: 4 TABLET | Refills: 0 | Status: SHIPPED | OUTPATIENT
Start: 2023-03-21

## 2023-04-05 ENCOUNTER — HOSPITAL ENCOUNTER (OUTPATIENT)
Dept: MRI IMAGING | Age: 53
End: 2023-04-05
Attending: INTERNAL MEDICINE
Payer: MEDICAID

## 2023-04-05 PROCEDURE — 70553 MRI BRAIN STEM W/O & W/DYE: CPT

## 2023-04-05 PROCEDURE — A9576 INJ PROHANCE MULTIPACK: HCPCS | Performed by: INTERNAL MEDICINE

## 2023-04-05 PROCEDURE — 74011250636 HC RX REV CODE- 250/636: Performed by: INTERNAL MEDICINE

## 2023-04-05 RX ADMIN — GADOTERIDOL 15 ML: 279.3 INJECTION, SOLUTION INTRAVENOUS at 17:39

## 2023-04-13 ENCOUNTER — HOSPITAL ENCOUNTER (OUTPATIENT)
Dept: MRI IMAGING | Age: 53
Discharge: HOME | End: 2023-04-13
Attending: INTERNAL MEDICINE
Payer: MEDICAID

## 2023-04-13 VITALS — WEIGHT: 160 LBS | BODY MASS INDEX: 25.82 KG/M2

## 2023-04-13 DIAGNOSIS — G96.02 SPINAL CEREBROSPINAL FLUID LEAK, SPONTANEOUS: ICD-10-CM

## 2023-04-13 PROCEDURE — A9576 INJ PROHANCE MULTIPACK: HCPCS | Performed by: INTERNAL MEDICINE

## 2023-04-13 PROCEDURE — 74011250636 HC RX REV CODE- 250/636: Performed by: INTERNAL MEDICINE

## 2023-04-13 PROCEDURE — 72156 MRI NECK SPINE W/O & W/DYE: CPT

## 2023-04-13 RX ADMIN — GADOTERIDOL 15 ML: 279.3 INJECTION, SOLUTION INTRAVENOUS at 16:29

## 2023-04-26 ENCOUNTER — PATIENT MESSAGE (OUTPATIENT)
Dept: PRIMARY CARE CLINIC | Age: 53
End: 2023-04-26

## 2023-04-26 DIAGNOSIS — M48.02 CERVICAL SPINAL STENOSIS: ICD-10-CM

## 2023-04-27 RX ORDER — CYCLOBENZAPRINE HCL 10 MG
10 TABLET ORAL 2 TIMES DAILY
Qty: 60 TABLET | Refills: 1 | Status: SHIPPED | OUTPATIENT
Start: 2023-04-27

## 2023-04-27 NOTE — TELEPHONE ENCOUNTER
From: Shayy Noguera  To: Flower Holcomb DO  Sent: 4/26/2023 7:59 AM EDT  Subject: Refill Flexeril 10mg     I am almost out of my Flexeril prescription 10 mg for muscle spasms in my neck and shoulders. Could you please get it refilled for me? Thanks so much!  [facility] [address] [place]

## 2023-05-21 RX ORDER — MESALAMINE 800 MG/1
TABLET, DELAYED RELEASE ORAL
COMMUNITY
Start: 2022-01-25

## 2023-05-21 RX ORDER — ALBUTEROL SULFATE 90 UG/1
1 AEROSOL, METERED RESPIRATORY (INHALATION) EVERY 4 HOURS PRN
COMMUNITY
Start: 2022-02-01

## 2023-05-21 RX ORDER — ROSUVASTATIN CALCIUM 20 MG/1
20 TABLET, COATED ORAL NIGHTLY
COMMUNITY

## 2023-05-21 RX ORDER — CYCLOBENZAPRINE HCL 10 MG
10 TABLET ORAL 2 TIMES DAILY
COMMUNITY
Start: 2023-04-27

## 2023-05-21 RX ORDER — DIAZEPAM 2 MG/1
5 TABLET ORAL PRN
COMMUNITY
Start: 2023-03-21

## 2023-08-02 ENCOUNTER — APPOINTMENT (OUTPATIENT)
Dept: URBAN - METROPOLITAN AREA SURGERY 9 | Age: 53
Setting detail: DERMATOLOGY
End: 2023-08-07

## 2023-08-02 DIAGNOSIS — L72.8 OTHER FOLLICULAR CYSTS OF THE SKIN AND SUBCUTANEOUS TISSUE: ICD-10-CM

## 2023-08-02 DIAGNOSIS — D22 MELANOCYTIC NEVI: ICD-10-CM

## 2023-08-02 DIAGNOSIS — D18.0 HEMANGIOMA: ICD-10-CM

## 2023-08-02 DIAGNOSIS — L81.4 OTHER MELANIN HYPERPIGMENTATION: ICD-10-CM

## 2023-08-02 DIAGNOSIS — L82.1 OTHER SEBORRHEIC KERATOSIS: ICD-10-CM

## 2023-08-02 PROBLEM — D22.5 MELANOCYTIC NEVI OF TRUNK: Status: ACTIVE | Noted: 2023-08-02

## 2023-08-02 PROBLEM — D18.01 HEMANGIOMA OF SKIN AND SUBCUTANEOUS TISSUE: Status: ACTIVE | Noted: 2023-08-02

## 2023-08-02 PROCEDURE — 99203 OFFICE O/P NEW LOW 30 MIN: CPT

## 2023-08-02 PROCEDURE — OTHER CONSULTATION EXCISION: OTHER

## 2023-08-02 PROCEDURE — OTHER MIPS QUALITY: OTHER

## 2023-08-02 PROCEDURE — OTHER COUNSELING: OTHER

## 2023-08-02 PROCEDURE — OTHER REASSURANCE: OTHER

## 2023-08-02 ASSESSMENT — LOCATION DETAILED DESCRIPTION DERM
LOCATION DETAILED: INFERIOR THORACIC SPINE
LOCATION DETAILED: RIGHT SUPERIOR MEDIAL UPPER BACK
LOCATION DETAILED: LEFT PROXIMAL PRETIBIAL REGION
LOCATION DETAILED: RIGHT SUPERIOR UPPER BACK

## 2023-08-02 ASSESSMENT — LOCATION SIMPLE DESCRIPTION DERM
LOCATION SIMPLE: UPPER BACK
LOCATION SIMPLE: RIGHT UPPER BACK
LOCATION SIMPLE: LEFT PRETIBIAL REGION

## 2023-08-02 ASSESSMENT — LOCATION ZONE DERM
LOCATION ZONE: TRUNK
LOCATION ZONE: LEG

## 2023-08-02 NOTE — PROCEDURE: CONSULTATION EXCISION
X Size Of Lesion In Cm (Optional): 0
Detail Level: Detailed
Other Plan: punch excision
Size Of Lesion: 0.8

## 2023-08-02 NOTE — PROCEDURE: COUNSELING
Detail Level: Generalized
Sunscreen Recommendation Label Override: Sunscreen
Sunscreen Recommendations: broad spectrum sunscreen SPF 30 or greater daily, reapply at least every 2 hours
Detail Level: Detailed

## 2023-08-17 ENCOUNTER — APPOINTMENT (OUTPATIENT)
Dept: URBAN - METROPOLITAN AREA SURGERY 9 | Age: 53
Setting detail: DERMATOLOGY
End: 2023-08-17

## 2023-08-17 DIAGNOSIS — L72.8 OTHER FOLLICULAR CYSTS OF THE SKIN AND SUBCUTANEOUS TISSUE: ICD-10-CM

## 2023-08-17 PROCEDURE — 12031 INTMD RPR S/A/T/EXT 2.5 CM/<: CPT

## 2023-08-17 PROCEDURE — OTHER MIPS QUALITY: OTHER

## 2023-08-17 PROCEDURE — 11401 EXC TR-EXT B9+MARG 0.6-1 CM: CPT

## 2023-08-17 PROCEDURE — OTHER PUNCH EXCISION: OTHER

## 2023-08-17 PROCEDURE — OTHER ADDITIONAL NOTES: OTHER

## 2023-08-17 ASSESSMENT — LOCATION SIMPLE DESCRIPTION DERM: LOCATION SIMPLE: RIGHT UPPER BACK

## 2023-08-17 ASSESSMENT — LOCATION ZONE DERM: LOCATION ZONE: TRUNK

## 2023-08-17 ASSESSMENT — LOCATION DETAILED DESCRIPTION DERM: LOCATION DETAILED: RIGHT SUPERIOR UPPER BACK

## 2023-08-17 NOTE — PROCEDURE: ADDITIONAL NOTES
Detail Level: Detailed
Additional Notes: Pt will have sutures removed at home.
Render Risk Assessment In Note?: no

## 2023-10-04 ENCOUNTER — NURSE TRIAGE (OUTPATIENT)
Dept: OTHER | Facility: CLINIC | Age: 53
End: 2023-10-04

## 2023-10-04 NOTE — TELEPHONE ENCOUNTER
Location of patient: [address] [facility] call from [place] at [facility] with [employer]. Subjective: Caller states \"I have dizziness\"     Current Symptoms: Nasal congestion, allergies. Dizziness when sitting up right. Some lightheadedness at time of call while sitting. Vertigo last night that caused her to be nauseated. A lot of wax in her ears. Onset: 2 weeks ago; waxing and waning    Associated Symptoms: reduced activity    Pain Severity: pressure; constant    Temperature: denies fever     What has been tried: Nasal Spray, Allegra, water    LMP: NA Pregnant: NA    Recommended disposition: Go to [facility]    Care advice provided, patient verbalizes understanding; denies any other questions or concerns; instructed to call back for any new or worsening symptoms. Patient/Caller agrees with recommended disposition; writer provided warm transfer to [facility] for appointment scheduling    Attention Provider: Thank you for allowing me to participate in the care of your patient. The patient was connected to triage in response to information provided to the ECC/PSC. Please do not respond through this encounter as the response is not directed to a shared pool.     Reason for Disposition   Lightheadedness (dizziness) present now, after 2 hours of rest and fluids    Protocols used: Dizziness-ADULT-OH

## 2023-10-09 ENCOUNTER — OFFICE VISIT (OUTPATIENT)
Dept: PRIMARY CARE CLINIC | Facility: CLINIC | Age: 53
End: 2023-10-09
Payer: MEDICAID

## 2023-10-09 VITALS
TEMPERATURE: 97.6 F | DIASTOLIC BLOOD PRESSURE: 76 MMHG | RESPIRATION RATE: 17 BRPM | OXYGEN SATURATION: 97 % | WEIGHT: 169.8 LBS | HEART RATE: 73 BPM | SYSTOLIC BLOOD PRESSURE: 117 MMHG | BODY MASS INDEX: 27.29 KG/M2 | HEIGHT: 66 IN

## 2023-10-09 DIAGNOSIS — K51.919 ULCERATIVE COLITIS WITH COMPLICATION, UNSPECIFIED LOCATION (HCC): ICD-10-CM

## 2023-10-09 DIAGNOSIS — R73.02 IMPAIRED GLUCOSE TOLERANCE (ORAL): ICD-10-CM

## 2023-10-09 DIAGNOSIS — J01.00 SUBACUTE MAXILLARY SINUSITIS: Primary | ICD-10-CM

## 2023-10-09 PROCEDURE — 99214 OFFICE O/P EST MOD 30 MIN: CPT | Performed by: FAMILY MEDICINE

## 2023-10-09 RX ORDER — FLUCONAZOLE 150 MG/1
150 TABLET ORAL ONCE
Qty: 1 TABLET | Refills: 0 | Status: SHIPPED | OUTPATIENT
Start: 2023-10-09 | End: 2023-10-09

## 2023-10-09 RX ORDER — FLUTICASONE FUROATE 27.5 UG/1
2 SPRAY, METERED NASAL DAILY
Qty: 1 EACH | Refills: 3 | Status: SHIPPED | OUTPATIENT
Start: 2023-10-09

## 2023-10-09 RX ORDER — AMOXICILLIN AND CLAVULANATE POTASSIUM 875; 125 MG/1; MG/1
1 TABLET, FILM COATED ORAL 2 TIMES DAILY
Qty: 14 TABLET | Refills: 0 | Status: SHIPPED | OUTPATIENT
Start: 2023-10-09 | End: 2023-10-16

## 2023-10-09 SDOH — ECONOMIC STABILITY: INCOME INSECURITY: HOW HARD IS IT FOR YOU TO PAY FOR THE VERY BASICS LIKE FOOD, HOUSING, MEDICAL CARE, AND HEATING?: PATIENT DECLINED

## 2023-10-09 SDOH — ECONOMIC STABILITY: HOUSING INSECURITY
IN THE LAST 12 MONTHS, WAS THERE A TIME WHEN YOU DID NOT HAVE A STEADY PLACE TO SLEEP OR SLEPT IN A SHELTER (INCLUDING NOW)?: PATIENT REFUSED

## 2023-10-09 SDOH — ECONOMIC STABILITY: FOOD INSECURITY: WITHIN THE PAST 12 MONTHS, THE FOOD YOU BOUGHT JUST DIDN'T LAST AND YOU DIDN'T HAVE MONEY TO GET MORE.: PATIENT DECLINED

## 2023-10-09 SDOH — ECONOMIC STABILITY: FOOD INSECURITY: WITHIN THE PAST 12 MONTHS, YOU WORRIED THAT YOUR FOOD WOULD RUN OUT BEFORE YOU GOT MONEY TO BUY MORE.: PATIENT DECLINED

## 2023-10-09 ASSESSMENT — PATIENT HEALTH QUESTIONNAIRE - PHQ9
1. LITTLE INTEREST OR PLEASURE IN DOING THINGS: 1
SUM OF ALL RESPONSES TO PHQ QUESTIONS 1-9: 2
2. FEELING DOWN, DEPRESSED OR HOPELESS: 1
SUM OF ALL RESPONSES TO PHQ QUESTIONS 1-9: 2
SUM OF ALL RESPONSES TO PHQ QUESTIONS 1-9: 2
SUM OF ALL RESPONSES TO PHQ9 QUESTIONS 1 & 2: 2
SUM OF ALL RESPONSES TO PHQ QUESTIONS 1-9: 2

## 2023-10-10 LAB
ALBUMIN SERPL-MCNC: 4.7 G/DL (ref 3.9–4.9)
ALBUMIN/GLOB SERPL: 1.8 {RATIO} (ref 1.2–2.2)
ALP SERPL-CCNC: 89 IU/L (ref 44–121)
ALT SERPL-CCNC: 26 IU/L (ref 0–32)
AST SERPL-CCNC: 28 IU/L (ref 0–40)
BILIRUB SERPL-MCNC: 0.3 MG/DL (ref 0–1.2)
BUN SERPL-MCNC: 9 MG/DL (ref 6–24)
BUN/CREAT SERPL: 12 (ref 9–23)
CALCIUM SERPL-MCNC: 9.8 MG/DL (ref 8.7–10.2)
CHLORIDE SERPL-SCNC: 104 MMOL/L (ref 96–106)
CO2 SERPL-SCNC: 21 MMOL/L (ref 20–29)
CREAT SERPL-MCNC: 0.78 MG/DL (ref 0.57–1)
EGFRCR SERPLBLD CKD-EPI 2021: 92 ML/MIN/1.73
ERYTHROCYTE [DISTWIDTH] IN BLOOD BY AUTOMATED COUNT: 12.3 % (ref 11.7–15.4)
GLOBULIN SER CALC-MCNC: 2.6 G/DL (ref 1.5–4.5)
GLUCOSE SERPL-MCNC: 72 MG/DL (ref 70–99)
HBA1C MFR BLD: 5.8 % (ref 4.8–5.6)
HCT VFR BLD AUTO: 43.4 % (ref 34–46.6)
HGB BLD-MCNC: 15 G/DL (ref 11.1–15.9)
MCH RBC QN AUTO: 30.7 PG (ref 26.6–33)
MCHC RBC AUTO-ENTMCNC: 34.6 G/DL (ref 31.5–35.7)
MCV RBC AUTO: 89 FL (ref 79–97)
PLATELET # BLD AUTO: 365 X10E3/UL (ref 150–450)
POTASSIUM SERPL-SCNC: 4.2 MMOL/L (ref 3.5–5.2)
PROT SERPL-MCNC: 7.3 G/DL (ref 6–8.5)
RBC # BLD AUTO: 4.88 X10E6/UL (ref 3.77–5.28)
SODIUM SERPL-SCNC: 141 MMOL/L (ref 134–144)
WBC # BLD AUTO: 6.9 X10E3/UL (ref 3.4–10.8)

## 2023-10-31 RX ORDER — CYCLOBENZAPRINE HCL 10 MG
10 TABLET ORAL 2 TIMES DAILY
Qty: 60 TABLET | OUTPATIENT
Start: 2023-10-31

## 2023-11-02 DIAGNOSIS — G44.59 OTHER COMPLICATED HEADACHE SYNDROME: Primary | ICD-10-CM

## 2023-11-02 RX ORDER — CYCLOBENZAPRINE HCL 10 MG
10 TABLET ORAL 2 TIMES DAILY
Qty: 60 TABLET | Refills: 0 | Status: SHIPPED | OUTPATIENT
Start: 2023-11-02

## 2023-11-28 ENCOUNTER — TELEPHONE (OUTPATIENT)
Dept: PRIMARY CARE CLINIC | Facility: CLINIC | Age: 53
End: 2023-11-28

## 2023-11-28 NOTE — TELEPHONE ENCOUNTER
Called pt to let her know what Hardeep Cano said which was,     \"May try mucinex DM or dayquil/nyquil. \"    Name and  verified.

## 2023-11-28 NOTE — TELEPHONE ENCOUNTER
Patient said she has been diagnosed with covid and was given benzonatate 100 mg  for the cough. Patient wants to know if there is something else she can take for the cough because the benzonatate is not working.  Patient asked if someone can call her back

## 2024-01-22 ENCOUNTER — TELEPHONE (OUTPATIENT)
Age: 54
End: 2024-01-22

## 2024-01-22 DIAGNOSIS — G44.59 OTHER COMPLICATED HEADACHE SYNDROME: ICD-10-CM

## 2024-01-22 NOTE — TELEPHONE ENCOUNTER
Patient needs to reschedule appt for today and she would like to be seen as soon as possible, and is asking if it can even be a virtual appointment, if possible.     She stated she used to see Dr. Marcano, and Dr. Jimenez, but she would like a new neurologist. Thank you!

## 2024-01-23 RX ORDER — CYCLOBENZAPRINE HCL 10 MG
10 TABLET ORAL 2 TIMES DAILY
Qty: 60 TABLET | Refills: 0 | Status: SHIPPED | OUTPATIENT
Start: 2024-01-23

## 2024-01-23 NOTE — TELEPHONE ENCOUNTER
PCP: Hailey Carr DO    Last Visit 10/9/2023   Future Appointments   Date Time Provider Department Center   1/25/2024  3:00 PM Hailey Carr DO [facility]   2/14/2024  3:00 PM Danae Colon, APRN - CNP [facility]   3/8/2024  1:30 PM [facility] 2 [facility]   6/11/2024  1:30 PM Carly Bernard APRN - NP [facility]       Requested Prescriptions     Pending Prescriptions Disp Refills    cyclobenzaprine (FLEXERIL) 10 MG tablet [Pharmacy Med Name: CYCLOBENZAPRINE 10 MG TABLET] 60 tablet 0     Sig: TAKE 1 TABLET BY MOUTH TWICE A DAY         Other Comments: Last Refill   11/02/23

## 2024-04-01 DIAGNOSIS — G44.59 OTHER COMPLICATED HEADACHE SYNDROME: ICD-10-CM

## 2024-04-04 RX ORDER — CYCLOBENZAPRINE HCL 10 MG
10 TABLET ORAL 2 TIMES DAILY
Qty: 60 TABLET | Refills: 0 | Status: SHIPPED | OUTPATIENT
Start: 2024-04-04

## 2024-04-19 ENCOUNTER — TRANSCRIBE ORDERS (OUTPATIENT)
Facility: HOSPITAL | Age: 54
End: 2024-04-19

## 2024-04-19 DIAGNOSIS — Z12.31 SCREENING MAMMOGRAM FOR HIGH-RISK PATIENT: Primary | ICD-10-CM

## 2024-07-09 DIAGNOSIS — G44.59 OTHER COMPLICATED HEADACHE SYNDROME: ICD-10-CM

## 2024-07-11 RX ORDER — CYCLOBENZAPRINE HCL 10 MG
10 TABLET ORAL 2 TIMES DAILY
Qty: 60 TABLET | Refills: 0 | OUTPATIENT
Start: 2024-07-11

## 2024-07-22 ENCOUNTER — TELEMEDICINE (OUTPATIENT)
Age: 54
End: 2024-07-22
Payer: MEDICAID

## 2024-07-22 DIAGNOSIS — G97.84: Primary | ICD-10-CM

## 2024-07-22 PROCEDURE — 99214 OFFICE O/P EST MOD 30 MIN: CPT | Performed by: NURSE PRACTITIONER

## 2024-07-22 RX ORDER — DIAZEPAM 2 MG/1
TABLET ORAL
Qty: 2 TABLET | Refills: 0 | Status: SHIPPED | OUTPATIENT
Start: 2024-07-22 | End: 2025-07-22

## 2024-07-22 NOTE — PROGRESS NOTES
Results   Component Value Date    CHOL 184 2023    TRIG 98 2023    HDL 54 2023     (H) 2023    VLDL 18 2023    CHOLHDLRATIO 4.6 2021       Lab Results   Component Value Date     10/09/2023    K 4.2 10/09/2023     10/09/2023    CO2 21 10/09/2023    BUN 9 10/09/2023    CREATININE 0.78 10/09/2023    GLUCOSE 72 10/09/2023    CALCIUM 9.8 10/09/2023    BILITOT 0.3 10/09/2023    ALKPHOS 89 10/09/2023    AST 28 10/09/2023    ALT 26 10/09/2023    LABGLOM 92 10/09/2023    GFRAA >60 2021    AGRATIO 1.8 10/09/2023    GLOB 3.3 2021       Lab Results   Component Value Date    WBC 6.9 10/09/2023    HGB 15.0 10/09/2023    HCT 43.4 10/09/2023    MCV 89 10/09/2023     10/09/2023    LYMPHOPCT 43 2023    RBC 4.88 10/09/2023    MCH 30.7 10/09/2023    MCHC 34.6 10/09/2023    RDW 12.3 10/09/2023       Past medical history/surgical history, family history, and social history have been reviewed for today's visit.    Past Medical History:   Diagnosis Date    Arthritis     joints, back,neck    Herniated cervical disc 2014    c4, c6    High cholesterol     Ill-defined condition 2021    spinal fluid leak s/p pain injection    Mononucleosis     Palpitations 2016     Past Surgical History:   Procedure Laterality Date    COLONOSCOPY N/A 2021    COLONOSCOPY/EGD performed by Clayton Green MD at [facility] ENDOSCOPY    FLEXIBLE SIGMOIDOSCOPY N/A 2022    FLEXIBLE SIGMOIDOSCOPY will bring performed by Clayton Green MD at [facility] ENDOSCOPY    HEENT      nose    IR INJ EPI BLOOD OR CLOT PATCH  2021    OTHER SURGICAL HISTORY  2022    colorectal flex sig    OTHER SURGICAL HISTORY  2021    blood patch lumbar     Social History     Tobacco Use    Smoking status: Former     Current packs/day: 0.00     Types: Cigarettes     Quit date: 3/7/2015     Years since quittin.3    Smokeless tobacco: Former     Quit date: 2015   Substance Use Topics

## 2024-07-22 NOTE — ASSESSMENT & PLAN NOTE
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 Dr Marcano in 2021, and most recently by Dr. Jimenez (2/28/2023). The patient continues to receive care from Dr. Akins at [place]. She had a cervical blood patch which has seemed to help with her symptoms about 70%.  She still has some residual deficits such as some cognitive and memory issues as well as dizziness and fatigue.  Sometimes she can also feel diaphoretic and feverish -she attributes this to her diagnosis of ulcerative colitis.     Recommendations:  Recommend continuing follow-up with Dr. Akins at [place]  Patient would like to reestablish with Dr. Marcano, contact information sent to patient through [employer]  Will obtain MRI of the cervical spine as well as MRI of the brain with and without contrast to evaluate for CSF leak.  Patient states she is quite claustrophobic, she does require premed with diazepam prior to MRI  She has about refills on her Flexeril, Flexeril has been prescribed by Dr. Carr her PCP.  Advised patient to reach back out to her PCP

## 2024-07-23 ENCOUNTER — CLINICAL DOCUMENTATION (OUTPATIENT)
Age: 54
End: 2024-07-23

## 2024-07-23 NOTE — PROGRESS NOTES
Mailed orders for MRI of brain and cervical spine to patient's address listed in the chart. Included central scheduling information.

## 2024-08-22 ENCOUNTER — TELEMEDICINE (OUTPATIENT)
Dept: PRIMARY CARE CLINIC | Facility: CLINIC | Age: 54
End: 2024-08-22
Payer: MEDICAID

## 2024-08-22 ENCOUNTER — TELEPHONE (OUTPATIENT)
Dept: PRIMARY CARE CLINIC | Facility: CLINIC | Age: 54
End: 2024-08-22

## 2024-08-22 DIAGNOSIS — R73.02 IMPAIRED GLUCOSE TOLERANCE (ORAL): ICD-10-CM

## 2024-08-22 DIAGNOSIS — K51.919 ULCERATIVE COLITIS WITH COMPLICATION, UNSPECIFIED LOCATION (HCC): Primary | ICD-10-CM

## 2024-08-22 DIAGNOSIS — E78.2 MIXED HYPERLIPIDEMIA: ICD-10-CM

## 2024-08-22 DIAGNOSIS — K51.919 ULCERATIVE COLITIS WITH COMPLICATION, UNSPECIFIED LOCATION (HCC): ICD-10-CM

## 2024-08-22 DIAGNOSIS — Z82.49 FAMILY HISTORY OF HEART DISEASE: ICD-10-CM

## 2024-08-22 DIAGNOSIS — G44.59 OTHER COMPLICATED HEADACHE SYNDROME: ICD-10-CM

## 2024-08-22 PROCEDURE — 99214 OFFICE O/P EST MOD 30 MIN: CPT | Performed by: FAMILY MEDICINE

## 2024-08-22 RX ORDER — ALBUTEROL SULFATE 90 UG/1
1 AEROSOL, METERED RESPIRATORY (INHALATION) EVERY 4 HOURS PRN
Qty: 18 G | Refills: 5 | Status: SHIPPED | OUTPATIENT
Start: 2024-08-22

## 2024-08-22 RX ORDER — CYCLOBENZAPRINE HCL 10 MG
10 TABLET ORAL 2 TIMES DAILY
Qty: 60 TABLET | Refills: 0 | Status: SHIPPED | OUTPATIENT
Start: 2024-08-22

## 2024-08-22 SDOH — ECONOMIC STABILITY: FOOD INSECURITY: WITHIN THE PAST 12 MONTHS, YOU WORRIED THAT YOUR FOOD WOULD RUN OUT BEFORE YOU GOT MONEY TO BUY MORE.: NEVER TRUE

## 2024-08-22 SDOH — ECONOMIC STABILITY: FOOD INSECURITY: WITHIN THE PAST 12 MONTHS, THE FOOD YOU BOUGHT JUST DIDN'T LAST AND YOU DIDN'T HAVE MONEY TO GET MORE.: NEVER TRUE

## 2024-08-22 SDOH — ECONOMIC STABILITY: INCOME INSECURITY: HOW HARD IS IT FOR YOU TO PAY FOR THE VERY BASICS LIKE FOOD, HOUSING, MEDICAL CARE, AND HEATING?: NOT HARD AT ALL

## 2024-08-22 ASSESSMENT — PATIENT HEALTH QUESTIONNAIRE - PHQ9
10. IF YOU CHECKED OFF ANY PROBLEMS, HOW DIFFICULT HAVE THESE PROBLEMS MADE IT FOR YOU TO DO YOUR WORK, TAKE CARE OF THINGS AT HOME, OR GET ALONG WITH OTHER PEOPLE: SOMEWHAT DIFFICULT
9. THOUGHTS THAT YOU WOULD BE BETTER OFF DEAD, OR OF HURTING YOURSELF: NOT AT ALL
4. FEELING TIRED OR HAVING LITTLE ENERGY: SEVERAL DAYS
SUM OF ALL RESPONSES TO PHQ9 QUESTIONS 1 & 2: 3
6. FEELING BAD ABOUT YOURSELF - OR THAT YOU ARE A FAILURE OR HAVE LET YOURSELF OR YOUR FAMILY DOWN: NOT AT ALL
SUM OF ALL RESPONSES TO PHQ QUESTIONS 1-9: 4
2. FEELING DOWN, DEPRESSED OR HOPELESS: NEARLY EVERY DAY
1. LITTLE INTEREST OR PLEASURE IN DOING THINGS: NOT AT ALL
SUM OF ALL RESPONSES TO PHQ QUESTIONS 1-9: 4
SUM OF ALL RESPONSES TO PHQ QUESTIONS 1-9: 4
8. MOVING OR SPEAKING SO SLOWLY THAT OTHER PEOPLE COULD HAVE NOTICED. OR THE OPPOSITE, BEING SO FIGETY OR RESTLESS THAT YOU HAVE BEEN MOVING AROUND A LOT MORE THAN USUAL: NOT AT ALL
SUM OF ALL RESPONSES TO PHQ QUESTIONS 1-9: 4
3. TROUBLE FALLING OR STAYING ASLEEP: NOT AT ALL
5. POOR APPETITE OR OVEREATING: NOT AT ALL

## 2024-08-22 NOTE — PROGRESS NOTES
Liane Cobb, was evaluated through a synchronous (real-time) audio-video encounter. The patient (or guardian if applicable) is aware that this is a billable service, which includes applicable co-pays. This Virtual Visit was conducted with patient's (and/or legal guardian's) consent. Patient identification was verified, and a caregiver was present when appropriate.   The patient was located at Home: [address]  [address]  [place] [address]  Provider was located at Home [place]Appt [place] State[place]  Confirm you are appropriately licensed, registered, or certified to deliver care in the state where the patient is located as indicated above. If you are not or unsure, please re-schedule the visit: Yes, I confirm.     Liane Cobb (:  1970) is a Established patient, presenting virtually for evaluation of the following:      Below is the assessment and plan developed based on review of pertinent history, physical exam, labs, studies, and medications.     Assessment & Plan  Other complicated headache syndrome       Orders:    cyclobenzaprine (FLEXERIL) 10 MG tablet; Take 1 tablet by mouth 2 times daily    Ulcerative colitis with complication, unspecified location (HCC)       Orders:    Comprehensive Metabolic Panel; Future    CBC; Future    Vitamin B12; Future    Vitamin D 25 Hydroxy; Future    Lead, Blood; Future    Impaired glucose tolerance (oral)       Orders:    Hemoglobin A1C; Future    Comprehensive Metabolic Panel; Future    Mixed hyperlipidemia       Orders:    Comprehensive Metabolic Panel; Future    Lipoprotein NMR; Future  advised if her back pain does not improve in the next few days, or if she develops new or worsened symptoms she should seek care.   We discussed medication use and common SE.   Labs per pt request. I have discussed her insurance may not cover labs and she requests they be ordered.       No follow-ups on file.       Subjective   HPI  Intracranial hypertension.  Following with

## 2024-08-22 NOTE — ASSESSMENT & PLAN NOTE
Orders:    Comprehensive Metabolic Panel; Future    CBC; Future    Vitamin B12; Future    Vitamin D 25 Hydroxy; Future    Lead, Blood; Future

## 2024-08-22 NOTE — TELEPHONE ENCOUNTER
Patient said she has an 11:00 am appointment today with Dr. Carr and her ride can't bring her at that time. Patient asked if she can be seen later today or tomorrow for her medication refill appointment

## 2024-08-22 NOTE — ASSESSMENT & PLAN NOTE
Orders:    Comprehensive Metabolic Panel; Future    Lipoprotein NMR; Future  advised if her back pain does not improve in the next few days, or if she develops new or worsened symptoms she should seek care.   We discussed medication use and common SE.   Labs per pt request. I have discussed her insurance may not cover labs and she requests they be ordered.

## 2024-08-22 NOTE — PROGRESS NOTES
\"Have you been to the ER, urgent care clinic since your last visit?  Hospitalized since your last visit?\"    NO    “Have you seen or consulted any other health care providers outside of [facility] since your last visit?”    NO    Have you had a mammogram?”   NO    Date of last Mammogram: 3/7/2023      “Have you had a pap smear?”    NO    Date of last Cervical Cancer screen (HPV or PAP): 8/6/2018             Click Here for Release of Records Request

## 2024-09-11 ENCOUNTER — TELEMEDICINE (OUTPATIENT)
Dept: PRIMARY CARE CLINIC | Facility: CLINIC | Age: 54
End: 2024-09-11
Payer: MEDICAID

## 2024-09-11 DIAGNOSIS — M54.9 CHRONIC BACK PAIN, UNSPECIFIED BACK LOCATION, UNSPECIFIED BACK PAIN LATERALITY: Primary | ICD-10-CM

## 2024-09-11 DIAGNOSIS — G89.29 CHRONIC BACK PAIN, UNSPECIFIED BACK LOCATION, UNSPECIFIED BACK PAIN LATERALITY: Primary | ICD-10-CM

## 2024-09-11 PROCEDURE — 99213 OFFICE O/P EST LOW 20 MIN: CPT | Performed by: FAMILY MEDICINE

## 2024-09-11 SDOH — ECONOMIC STABILITY: FOOD INSECURITY: WITHIN THE PAST 12 MONTHS, YOU WORRIED THAT YOUR FOOD WOULD RUN OUT BEFORE YOU GOT MONEY TO BUY MORE.: SOMETIMES TRUE

## 2024-09-11 SDOH — ECONOMIC STABILITY: INCOME INSECURITY: HOW HARD IS IT FOR YOU TO PAY FOR THE VERY BASICS LIKE FOOD, HOUSING, MEDICAL CARE, AND HEATING?: SOMEWHAT HARD

## 2024-09-11 SDOH — ECONOMIC STABILITY: FOOD INSECURITY: WITHIN THE PAST 12 MONTHS, THE FOOD YOU BOUGHT JUST DIDN'T LAST AND YOU DIDN'T HAVE MONEY TO GET MORE.: SOMETIMES TRUE

## 2024-09-11 ASSESSMENT — PATIENT HEALTH QUESTIONNAIRE - PHQ9
2. FEELING DOWN, DEPRESSED OR HOPELESS: SEVERAL DAYS
SUM OF ALL RESPONSES TO PHQ QUESTIONS 1-9: 1
1. LITTLE INTEREST OR PLEASURE IN DOING THINGS: NOT AT ALL
SUM OF ALL RESPONSES TO PHQ9 QUESTIONS 1 & 2: 1
SUM OF ALL RESPONSES TO PHQ QUESTIONS 1-9: 1

## 2024-10-14 ENCOUNTER — APPOINTMENT (OUTPATIENT)
Dept: URBAN - METROPOLITAN AREA SURGERY 9 | Age: 54
Setting detail: DERMATOLOGY
End: 2024-10-14

## 2024-10-14 DIAGNOSIS — D22 MELANOCYTIC NEVI: ICD-10-CM

## 2024-10-14 DIAGNOSIS — L73.8 OTHER SPECIFIED FOLLICULAR DISORDERS: ICD-10-CM

## 2024-10-14 DIAGNOSIS — L82.1 OTHER SEBORRHEIC KERATOSIS: ICD-10-CM

## 2024-10-14 DIAGNOSIS — L57.8 OTHER SKIN CHANGES DUE TO CHRONIC EXPOSURE TO NONIONIZING RADIATION: ICD-10-CM

## 2024-10-14 PROBLEM — D22.5 MELANOCYTIC NEVI OF TRUNK: Status: ACTIVE | Noted: 2024-10-14

## 2024-10-14 PROBLEM — D23.39 OTHER BENIGN NEOPLASM OF SKIN OF OTHER PARTS OF FACE: Status: ACTIVE | Noted: 2024-10-14

## 2024-10-14 PROCEDURE — OTHER MIPS QUALITY: OTHER

## 2024-10-14 PROCEDURE — OTHER REASSURANCE: OTHER

## 2024-10-14 PROCEDURE — 99213 OFFICE O/P EST LOW 20 MIN: CPT

## 2024-10-14 PROCEDURE — OTHER COUNSELING: OTHER

## 2024-10-14 ASSESSMENT — LOCATION ZONE DERM
LOCATION ZONE: LEG
LOCATION ZONE: TRUNK
LOCATION ZONE: FACE

## 2024-10-14 ASSESSMENT — LOCATION DETAILED DESCRIPTION DERM
LOCATION DETAILED: INFERIOR MID FOREHEAD
LOCATION DETAILED: SUBXIPHOID
LOCATION DETAILED: LEFT MEDIAL PROXIMAL PRETIBIAL REGION
LOCATION DETAILED: RIGHT CENTRAL MALAR CHEEK
LOCATION DETAILED: LEFT CENTRAL MALAR CHEEK
LOCATION DETAILED: EPIGASTRIC SKIN

## 2024-10-14 ASSESSMENT — LOCATION SIMPLE DESCRIPTION DERM
LOCATION SIMPLE: ABDOMEN
LOCATION SIMPLE: LEFT CHEEK
LOCATION SIMPLE: RIGHT CHEEK
LOCATION SIMPLE: LEFT PRETIBIAL REGION
LOCATION SIMPLE: INFERIOR FOREHEAD

## 2025-01-15 DIAGNOSIS — G44.59 OTHER COMPLICATED HEADACHE SYNDROME: ICD-10-CM

## 2025-01-15 RX ORDER — CYCLOBENZAPRINE HCL 10 MG
10 TABLET ORAL 2 TIMES DAILY
Qty: 60 TABLET | Refills: 0 | Status: SHIPPED | OUTPATIENT
Start: 2025-01-15

## 2025-01-15 NOTE — TELEPHONE ENCOUNTER
Patient called to ask if she would be able to switch her appt on 1/17/25 to a virtual.  I let her know that seeing Alicia for the first time usually has to be in person since she is establishing care with Alicia even though she was a former patient of Dr Carr.  She wanted a call back to let her know because she wanted to talk with the clinical staff first because she said she is at risk in coming in contact with germs.  I had made another appt for the patient on 2/28/25 but she wanted me to keep the other one on the schedule just in case you agreed to have a virtual with her on 1/17/25

## 2025-03-28 NOTE — PROCEDURES
118 [facility].  [address] [facility] 140 [facility], [address]  [phone]                              Flexible Sigmoidoscopy Procedure Note      Indications:    Ulcerative colitis     :  Bernie Botello MD    Staff: Endoscopy Technician-1: Rock Queen  Endoscopy RN-1: Dre Leon RN    Referring Provider: Rogerio Perez NP    Sedation:  MAC anesthesia    Procedure Details:  After informed consent was obtained with all risks and benefits of procedure explained and preoperative exam completed, the patient was taken to the endoscopy suite and placed in the left lateral decubitus position. Upon sequential sedation as per above, a digital rectal exam was performed. The Olympus videocolonoscope was inserted in the rectum and carefully advanced to the transverse colon. The quality of preparation was excellent. The colonoscope was slowly withdrawn with careful evaluation between folds. Retroflexion in the rectum was performed. Findings:   Rectum: minimally to mildly decreased vascular pattern; no granularity, no friability, no ulcerations  Sigmoid: normal  Descending Colon: normal    Interventions:  biopsy of colon           Specimens Removed:    ID Type Source Tests Collected by Time Destination   1 : Random Left Colon bx Preservative   Haleigh Garcia MD 1/31/2022 1417 Pathology   2 : Random Rectum bx Preservative   Haleigh Garcia MD 1/31/2022 1418 Pathology       Complications: None. EBL:      Impression:    See Postoperative diagnosis above    Recommendations:   - Await pathology evaluation of biopsy / resected tissue  -Clinical remission achieved; Decrease Asacol to 1 tablet TID (800mg TID)  - Will arrange for follow up visit in 3 months    Discharge Disposition:  Home in the company of a  when able to ambulate.     Bernie Botello MD  1/31/2022  2:26 PM \"Have you been to the ER, urgent care clinic since your last visit?  Hospitalized since your last visit?\"    no    “Have you seen or consulted any other health care providers outside our system since your last visit?”      no

## 2025-05-05 ENCOUNTER — TELEPHONE (OUTPATIENT)
Dept: PRIMARY CARE CLINIC | Facility: CLINIC | Age: 55
End: 2025-05-05

## 2025-05-05 NOTE — TELEPHONE ENCOUNTER
Returned patients call about re-scheduling her new patient appointment to a time sooner than August.  Also, she needs help with her [employer] account.  Left message.

## 2025-05-05 NOTE — TELEPHONE ENCOUNTER
----- Message from Joanna BOYER sent at 5/5/2025 12:20 PM EDT -----  Regarding: [facility] Appointment Request  [facility] Appointment Request    Patient needs appointment for [facility] Appointment Type: New to Provider.    Patient Requested Dates(s): something in June or July  Patient Requested Time: preferably afternoon but can take whatever is available  Provider Name: Alicia Bright, LATASHA     Reason for Appointment Request: Established Patient - Available appointments did not meet patient need    Note: patient have an appointment on August 7, 2025 and wanted to check if there is any sooner appointment. Patient also want to say that she is having hard time to reschedule through [employer].  --------------------------------------------------------------------------------------------------------------------------    Relationship to Patient: Self     Call Back Information: OK to leave message on voicemail  Preferred Call Back Number: Phone [phone]

## 2025-08-07 ENCOUNTER — OFFICE VISIT (OUTPATIENT)
Dept: PRIMARY CARE CLINIC | Facility: CLINIC | Age: 55
End: 2025-08-07
Payer: MEDICAID

## 2025-08-07 VITALS
DIASTOLIC BLOOD PRESSURE: 84 MMHG | BODY MASS INDEX: 26.78 KG/M2 | OXYGEN SATURATION: 98 % | RESPIRATION RATE: 16 BRPM | HEART RATE: 68 BPM | HEIGHT: 66 IN | SYSTOLIC BLOOD PRESSURE: 118 MMHG | WEIGHT: 166.6 LBS | TEMPERATURE: 97.1 F

## 2025-08-07 DIAGNOSIS — Z12.31 ENCOUNTER FOR SCREENING MAMMOGRAM FOR MALIGNANT NEOPLASM OF BREAST: ICD-10-CM

## 2025-08-07 DIAGNOSIS — E78.2 MIXED HYPERLIPIDEMIA: Primary | ICD-10-CM

## 2025-08-07 DIAGNOSIS — F10.10 ALCOHOL ABUSE: ICD-10-CM

## 2025-08-07 DIAGNOSIS — Z59.00 HOMELESS: ICD-10-CM

## 2025-08-07 DIAGNOSIS — T78.1XXA GASTROINTESTINAL FOOD SENSITIVITY: ICD-10-CM

## 2025-08-07 DIAGNOSIS — F32.1 MODERATE MAJOR DEPRESSION (HCC): ICD-10-CM

## 2025-08-07 DIAGNOSIS — K51.919 ULCERATIVE COLITIS WITH COMPLICATION, UNSPECIFIED LOCATION (HCC): ICD-10-CM

## 2025-08-07 DIAGNOSIS — E78.2 MIXED HYPERLIPIDEMIA: ICD-10-CM

## 2025-08-07 DIAGNOSIS — R73.03 PREDIABETES: ICD-10-CM

## 2025-08-07 DIAGNOSIS — Z87.891 PERSONAL HISTORY OF TOBACCO USE: ICD-10-CM

## 2025-08-07 PROCEDURE — G0296 VISIT TO DETERM LDCT ELIG: HCPCS

## 2025-08-07 PROCEDURE — 99214 OFFICE O/P EST MOD 30 MIN: CPT

## 2025-08-07 RX ORDER — MESALAMINE 1000 MG/1
SUPPOSITORY RECTAL
COMMUNITY
Start: 2025-07-05

## 2025-08-07 RX ORDER — MAGNESIUM GLUCONATE 27 MG(500)
500 TABLET ORAL 2 TIMES DAILY
COMMUNITY

## 2025-08-07 RX ORDER — MESALAMINE 800 MG/1
270 TABLET, DELAYED RELEASE ORAL
COMMUNITY
Start: 2025-05-28

## 2025-08-07 SDOH — ECONOMIC STABILITY: FOOD INSECURITY: WITHIN THE PAST 12 MONTHS, YOU WORRIED THAT YOUR FOOD WOULD RUN OUT BEFORE YOU GOT MONEY TO BUY MORE.: SOMETIMES TRUE

## 2025-08-07 SDOH — ECONOMIC STABILITY: FOOD INSECURITY: WITHIN THE PAST 12 MONTHS, THE FOOD YOU BOUGHT JUST DIDN'T LAST AND YOU DIDN'T HAVE MONEY TO GET MORE.: OFTEN TRUE

## 2025-08-07 SDOH — ECONOMIC STABILITY: TRANSPORTATION INSECURITY
IN THE PAST 12 MONTHS, HAS LACK OF TRANSPORTATION KEPT YOU FROM MEETINGS, WORK, OR FROM GETTING THINGS NEEDED FOR DAILY LIVING?: YES

## 2025-08-07 SDOH — HEALTH STABILITY: PHYSICAL HEALTH: ON AVERAGE, HOW MANY MINUTES DO YOU ENGAGE IN EXERCISE AT THIS LEVEL?: 60 MIN

## 2025-08-07 SDOH — ECONOMIC STABILITY: FOOD INSECURITY: WITHIN THE PAST 12 MONTHS, YOU WORRIED THAT YOUR FOOD WOULD RUN OUT BEFORE YOU GOT MONEY TO BUY MORE.: OFTEN TRUE

## 2025-08-07 SDOH — ECONOMIC STABILITY: FOOD INSECURITY: WITHIN THE PAST 12 MONTHS, THE FOOD YOU BOUGHT JUST DIDN'T LAST AND YOU DIDN'T HAVE MONEY TO GET MORE.: SOMETIMES TRUE

## 2025-08-07 SDOH — ECONOMIC STABILITY: INCOME INSECURITY: IN THE LAST 12 MONTHS, WAS THERE A TIME WHEN YOU WERE NOT ABLE TO PAY THE MORTGAGE OR RENT ON TIME?: PATIENT DECLINED

## 2025-08-07 SDOH — HEALTH STABILITY: PHYSICAL HEALTH: ON AVERAGE, HOW MANY DAYS PER WEEK DO YOU ENGAGE IN MODERATE TO STRENUOUS EXERCISE (LIKE A BRISK WALK)?: 3 DAYS

## 2025-08-07 SDOH — ECONOMIC STABILITY: TRANSPORTATION INSECURITY
IN THE PAST 12 MONTHS, HAS THE LACK OF TRANSPORTATION KEPT YOU FROM MEDICAL APPOINTMENTS OR FROM GETTING MEDICATIONS?: YES

## 2025-08-07 SDOH — ECONOMIC STABILITY - HOUSING INSECURITY: HOMELESSNESS UNSPECIFIED: Z59.00

## 2025-08-07 ASSESSMENT — PATIENT HEALTH QUESTIONNAIRE - PHQ9
8. MOVING OR SPEAKING SO SLOWLY THAT OTHER PEOPLE COULD HAVE NOTICED. OR THE OPPOSITE, BEING SO FIGETY OR RESTLESS THAT YOU HAVE BEEN MOVING AROUND A LOT MORE THAN USUAL: SEVERAL DAYS
9. THOUGHTS THAT YOU WOULD BE BETTER OFF DEAD, OR OF HURTING YOURSELF: NOT AT ALL
SUM OF ALL RESPONSES TO PHQ QUESTIONS 1-9: 15
SUM OF ALL RESPONSES TO PHQ QUESTIONS 1-9: 15
2. FEELING DOWN, DEPRESSED OR HOPELESS: NEARLY EVERY DAY
5. POOR APPETITE OR OVEREATING: NOT AT ALL
SUM OF ALL RESPONSES TO PHQ QUESTIONS 1-9: 15
10. IF YOU CHECKED OFF ANY PROBLEMS, HOW DIFFICULT HAVE THESE PROBLEMS MADE IT FOR YOU TO DO YOUR WORK, TAKE CARE OF THINGS AT HOME, OR GET ALONG WITH OTHER PEOPLE: EXTREMELY DIFFICULT
6. FEELING BAD ABOUT YOURSELF - OR THAT YOU ARE A FAILURE OR HAVE LET YOURSELF OR YOUR FAMILY DOWN: NEARLY EVERY DAY
4. FEELING TIRED OR HAVING LITTLE ENERGY: SEVERAL DAYS
1. LITTLE INTEREST OR PLEASURE IN DOING THINGS: NEARLY EVERY DAY
3. TROUBLE FALLING OR STAYING ASLEEP: SEVERAL DAYS
SUM OF ALL RESPONSES TO PHQ QUESTIONS 1-9: 15
7. TROUBLE CONCENTRATING ON THINGS, SUCH AS READING THE NEWSPAPER OR WATCHING TELEVISION: NEARLY EVERY DAY

## 2025-08-07 ASSESSMENT — ENCOUNTER SYMPTOMS
WHEEZING: 0
SHORTNESS OF BREATH: 0
DIARRHEA: 0
NAUSEA: 0
VOMITING: 0
ABDOMINAL PAIN: 0
COUGH: 0
BLOOD IN STOOL: 1
CONSTIPATION: 0

## 2025-08-12 ENCOUNTER — TELEPHONE (OUTPATIENT)
Dept: PRIMARY CARE CLINIC | Facility: CLINIC | Age: 55
End: 2025-08-12

## 2025-08-20 ENCOUNTER — PATIENT MESSAGE (OUTPATIENT)
Dept: PRIMARY CARE CLINIC | Facility: CLINIC | Age: 55
End: 2025-08-20

## 2025-08-26 ENCOUNTER — HOSPITAL ENCOUNTER (OUTPATIENT)
Facility: HOSPITAL | Age: 55
Discharge: HOME | End: 2025-08-29

## 2025-08-27 LAB
ALBUMIN SERPL-MCNC: 4.5 G/DL (ref 3.8–4.9)
ALP SERPL-CCNC: 98 IU/L (ref 44–121)
ALT SERPL-CCNC: 34 IU/L (ref 0–32)
AST SERPL-CCNC: 23 IU/L (ref 0–40)
BASOPHILS # BLD AUTO: 0.1 X10E3/UL (ref 0–0.2)
BASOPHILS NFR BLD AUTO: 2 %
BILIRUB SERPL-MCNC: 0.3 MG/DL (ref 0–1.2)
BUN SERPL-MCNC: 24 MG/DL (ref 6–24)
BUN/CREAT SERPL: 31 (ref 9–23)
CALCIUM SERPL-MCNC: 9.6 MG/DL (ref 8.7–10.2)
CHLORIDE SERPL-SCNC: 102 MMOL/L (ref 96–106)
CHOLEST SERPL-MCNC: 362 MG/DL (ref 100–199)
CO2 SERPL-SCNC: 21 MMOL/L (ref 20–29)
CREAT SERPL-MCNC: 0.77 MG/DL (ref 0.57–1)
EGFRCR SERPLBLD CKD-EPI 2021: 93 ML/MIN/1.73
EOSINOPHIL # BLD AUTO: 0.3 X10E3/UL (ref 0–0.4)
EOSINOPHIL NFR BLD AUTO: 5 %
ERYTHROCYTE [DISTWIDTH] IN BLOOD BY AUTOMATED COUNT: 13 % (ref 11.7–15.4)
EST. AVERAGE GLUCOSE BLD GHB EST-MCNC: 108 MG/DL
GLOBULIN SER CALC-MCNC: 2.6 G/DL (ref 1.5–4.5)
GLUCOSE SERPL-MCNC: 106 MG/DL (ref 70–99)
HBA1C MFR BLD: 5.4 % (ref 4.8–5.6)
HCT VFR BLD AUTO: 40.9 % (ref 34–46.6)
HDLC SERPL-MCNC: 55 MG/DL
HGB BLD-MCNC: 13.7 G/DL (ref 11.1–15.9)
IMM GRANULOCYTES # BLD AUTO: 0 X10E3/UL (ref 0–0.1)
IMM GRANULOCYTES NFR BLD AUTO: 0 %
LDLC SERPL CALC-MCNC: 296 MG/DL (ref 0–99)
LYMPHOCYTES # BLD AUTO: 2.3 X10E3/UL (ref 0.7–3.1)
LYMPHOCYTES NFR BLD AUTO: 44 %
Lab: ABNORMAL
MCH RBC QN AUTO: 30.2 PG (ref 26.6–33)
MCHC RBC AUTO-ENTMCNC: 33.5 G/DL (ref 31.5–35.7)
MCV RBC AUTO: 90 FL (ref 79–97)
MONOCYTES # BLD AUTO: 0.5 X10E3/UL (ref 0.1–0.9)
MONOCYTES NFR BLD AUTO: 9 %
NEUTROPHILS # BLD AUTO: 2.1 X10E3/UL (ref 1.4–7)
NEUTROPHILS NFR BLD AUTO: 40 %
PLATELET # BLD AUTO: 328 X10E3/UL (ref 150–450)
POTASSIUM SERPL-SCNC: 4.4 MMOL/L (ref 3.5–5.2)
PROT SERPL-MCNC: 7.1 G/DL (ref 6–8.5)
RBC # BLD AUTO: 4.53 X10E6/UL (ref 3.77–5.28)
SODIUM SERPL-SCNC: 137 MMOL/L (ref 134–144)
TRIGL SERPL-MCNC: 77 MG/DL (ref 0–149)
TSH SERPL DL<=0.005 MIU/L-ACNC: 2.72 UIU/ML (ref 0.45–4.5)
VLDLC SERPL CALC-MCNC: 11 MG/DL (ref 5–40)
WBC # BLD AUTO: 5.3 X10E3/UL (ref 3.4–10.8)

## 2025-09-03 ENCOUNTER — TELEPHONE (OUTPATIENT)
Dept: PRIMARY CARE CLINIC | Facility: CLINIC | Age: 55
End: 2025-09-03

## (undated) DEVICE — TUBING HYDR IRR --

## (undated) DEVICE — FORCEPS BX L240CM JAW DIA2.8MM L CAP W/ NDL MIC MESH TOOTH